# Patient Record
Sex: MALE | URBAN - METROPOLITAN AREA
[De-identification: names, ages, dates, MRNs, and addresses within clinical notes are randomized per-mention and may not be internally consistent; named-entity substitution may affect disease eponyms.]

---

## 2017-01-21 ENCOUNTER — TRANSFERRED RECORDS (OUTPATIENT)
Dept: HEALTH INFORMATION MANAGEMENT | Facility: CLINIC | Age: 72
End: 2017-01-21

## 2017-01-31 ENCOUNTER — HOSPITAL ENCOUNTER (OUTPATIENT)
Dept: CARDIAC REHAB | Facility: CLINIC | Age: 72
End: 2017-01-31
Attending: INTERNAL MEDICINE
Payer: MEDICARE

## 2017-01-31 VITALS — HEIGHT: 69 IN | WEIGHT: 224 LBS | BODY MASS INDEX: 33.18 KG/M2

## 2017-01-31 PROCEDURE — 40000575 ZZH STATISTIC OP CARDIAC VISIT #2: Performed by: OCCUPATIONAL THERAPIST

## 2017-01-31 PROCEDURE — 40000116 ZZH STATISTIC OP CR VISIT: Performed by: OCCUPATIONAL THERAPIST

## 2017-01-31 PROCEDURE — 93797 PHYS/QHP OP CAR RHAB WO ECG: CPT | Mod: 59 | Performed by: OCCUPATIONAL THERAPIST

## 2017-01-31 PROCEDURE — 93798 PHYS/QHP OP CAR RHAB W/ECG: CPT | Performed by: OCCUPATIONAL THERAPIST

## 2017-01-31 ASSESSMENT — 6 MINUTE WALK TEST (6MWT)
GENDER SELECTION: MALE
PREDICTED: 1542.63
FEMALE CALC: 1262.13
MALE CALC: 1533.28
TOTAL DISTANCE WALKED (FT): 1530
GENDER SELECTION: MALE
TOTAL DISTANCE WALKED (FT): 1530

## 2017-01-31 NOTE — PROGRESS NOTES
01/31/17 1500   Session   Session Initial Evaluation and Exercise Prescription   Certified through this date 03/01/17   Cardiac Rehab Assessment   Cardiac Rehab Assessment Patient is a pleasant fellow, who really is not sure he wants to exercise regularly.  He did have an exercise program a few years ago, but just got out of the routine.  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  The patient was assessed to be stable and appropriate to begin exercise.   The patient's functional capacity and exercise prescription were determined by the completion of the 6 minute walk test.  See results below.  The patient was oriented to the program.  Risk factor profile was completed. Goals and objectives were discussed. CV response was WNL. No symptoms, complaints or pain were reported. Good prognosis for reaching above goals. Skilled therapy is necessary in order to monitor CV response to exercise, to provide education on risk factors and behavior change counseling needed to achieve patient's goals.  Plan to progress to 30-40 minutes of exercise prior to discharge from cardiac rehab.  Initial THR of 20-30 beats above RHR; Effort rating of 4-6.  Initiate muscle conditioning as appropriate.  Provide risk factor education and behavior change counseling.        I have established, reviewed and made necessary changes to the individualized treatment plan and exercise prescription for this patient.    Physician Name (printed): ________________________   Date: _______  Time: ______    Physician Signature: ___________________________________________          General Information   Treatment Diagnosis STEMI   Date of Treatment Diagnosis 01/21/17   Secondary Treatment Diagnosis Stent   Significant Past CV History None   Comorbidities None   Other Medical History hx of kidney stones, tubular adenoma of colon, squamous cell CA, Nazia's  "syndrome,   Lead up symptoms left shoulder aching which radiated into bicep and central chest.   Hospital Location [facility]   Hospital Discharge Date 01/25/17   Signs and Symptoms Post Hospital Discharge None   Outpatient Cardiac Rehab Start Date 01/31/17   Primary Physician Dr. Christopher Joshi   Primary Physician Follow Up Scheduled   Cardiologist Dr Castellanos   Cardiologist Follow Up Scheduled   Ejection Fraction 53%   Risk Stratification Low   Summary of Cath Report   Summary of Cath Report Available   Left Main 30   RCA 99 distal   Living and Work Status    Living Arrangements and Social Status apartment   Support System Live with an adult   Return to Employment Yes   Occupation    Preventative Medications   CMS recommended medications Ace inhibitors;Beta Blocker;Antiplatelets;Lipid Lowering;Influenza vaccination;Pneumonia vaccination   Falls Screen   Have you fallen two or more times in the past year? No   Have you fallen and had an injury in the past year? No   Pain   Patient Currently in Pain No   Physical Assessments   Incisions WNL   Edema +1 Trace   Right Lung Sounds not assessed   Left Lung Sounds not assessed   Limitations No limitations   Individualized Treatment Plan   Monitored Sessions Scheduled 36   Monitored Sessions Attended 1   Nutrition Management - Weight Management   Assessment Initial Assessment   Age 71   Weight 101.606 kg (224 lb)   Height 1.74 m (5' 8.5\")   BMI (Calculated) 33.63   Initial Rate Your Plate Score. Dietary tool to assess eating patterns. Scores range from 24 to 72. The higher the score the healthier the eating pattern. 46   Nutrition Management - Lipids   Lipids Labs Not Available   Nutrition Management - Diabetes   Diabetes No   Nutrition Management Summary   Dietary Recommendations Mediterranean;Low Sodium;Low Cholesterol;Low Fat   Stages of Change for Diet Compliance Preparation   Goal #1 Description Patient will increase knowledge of heart healthy diet by either " attending nutrition classes or reading nutrional information pertinent to low sodium and Mediterreanean diet.   Goal #1 Target Date 02/28/17   Goal #2 Description Patient will lose 1/2 to 1 pound per week while in cardiac rehab by reducing fast foods, snacks and salt intake.   Goal #2 Target Date 02/28/17   Psychosocial Management   Psychosocial Assessment Initial   Is there history of clinical depression or increased risk of depression? No previous history   Current Level of Stress per Patient Report Mild   Current Coping Skills (think things through)   Initial Patient Health Questionnaire -9 Score (PHQ-9) for depression. 5-9 Minimal symptoms, 10-14 Minor depression, 15-19 Major depression, moderately severe, > 20 Major depression, severe  0   Initial [facility] Survey score.  Quality of Life:   If total score > 25 review individual areas where patient rated a 4 or 5.  Consider patients current medical condition and what role that plays on the score.   Adjust treatment protocol to improve areas of concern.  Consider the following:  PHQ9 score, DASI, and re-assessment within the next 30 days to assist with developing treatments.  15   Stages of Change Pre-Contemplation   Interventions Planned Patient to verbalize understanding of negative impact of stress to personal health   Other Core Components - Hypertension   History of or Diagnosis of Hypertension No   Currently taking Anti-Hypertensives Beta blocker;Ace Inhibitor   Other Core Components - Tobacco   History of Tobacco Use Yes   Quit Date or Planned Quit Date 01/21/17   Tobacco Use Status Recent (Quit < 6 mo ago)   Tobacco Habit Cigarettes   Tobacco Use per Day (average) 2-3   Stages of Change Preparation   Activity/Exercise History   Activity/Exercise Assessment Initial   Activity/Exercise Status prior to event? Sedentary   Number of Days Currently participating in Moderate Physical Activity? 0   Number of Days Currently performing  Aerobic Exercise  (including rehab)? 0   Number of Minutes per Session Currently of Aerobic Exercise (average)? 0   Current Stage of Change (Physical Activity) Preparation   Current Stage of Change (Aerobic Exercise) Pre-Contemplation   Activity/Exercise Comments Patient is not sure he wants to exercise.  He considers taking 5000 steps per day a lot of exercise.   Exercise Assessment   6 Minute Walk Predicted - Gender Selection Male   6 Minute Walk Predicted (Male) 1533.28   6 Minute Walk Predicted (Female) 1262.13   Initial 6 Minute Walk Distance (Feet) 1530 ft   Resting HR 87 bpm   Exercise HR 95 bpm   Post Exercise HR 82 bpm   Resting /56 mmHg   Exercise /58 mmHg   Post Exercise /60 mmHg   Effort Rating 5   Current MET Level 3.2   MET Level Goal 4-5   ECG Rhythm Normal sinus rhythm   Ectopy PVCs   Current Symptoms Denies symptoms   Exercise Prescription   Mode Treadmill;Nustep   Duration/Time 45-60 min   Frequency 3 daysweek   THR (85% of age predicted max HR) 125.8   OMNI Effort Rating (0-10 Scale) 4-6/10   Progression Continuous bouts;Total exercise time of 30-45 minutes;Progress peak intensity by 1/2 MET per week;Aerobic exercise to OMNI rating of 6 or below and at or below THR   Recommended Home Exercise   Type of Exercise Walking   Frequency (days per week) 2-3   Duration (minutes per session) 15-30 min   Effort Rating Recommended 4-6/10   30 Day Exercise Plan Patient will resume walking at MOA     Current Home Exercise   Type of Exercise None   Learning Assessment   Learner Patient   Primary Language English   Preferred Learning Style Listening;Reading;Demonstration;Pictures/Video   Patient Education   Education recommended Anatomy and Physiology of the Heart;Exercise Principles;Medication Overview;Nutrition;Stress Management

## 2017-01-31 NOTE — PROGRESS NOTES
01/31/17 1600   Session   Session 30 Day Individualized Treatment Plan   Certified through this date 03/04/17   Cardiac Rehab Assessment   Cardiac Rehab Assessment Patient is a pleasant fellow, who really is not sure he wants to exercise regularly.  He did have an exercise program a few years ago, but just got out of the routine.  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  Patient has only had his initial session, but will work on the goals listed below during the next month.   General Information   Treatment Diagnosis STEMI   Date of Treatment Diagnosis 01/21/17   Secondary Treatment Diagnosis Stent   Significant Past CV History None   Comorbidities None   Other Medical History hx of kidney stones, tubular adenoma of colon, squamous cell CA, Nazia's syndrome,   Lead up symptoms left shoulder aching which radiated into bicep and central chest.   Hospital Location [facility]   Hospital Discharge Date 01/25/17   Signs and Symptoms Post Hospital Discharge None   Outpatient Cardiac Rehab Start Date 01/31/17   Primary Physician Dr. Christopher Joshi   Primary Physician Follow Up Scheduled   Cardiologist Dr Castellanos   Cardiologist Follow Up Scheduled   Ejection Fraction 53%   Risk Stratification Low   Summary of Cath Report   Summary of Cath Report Available   Left Main 30   RCA 99 distal   Living and Work Status    Living Arrangements and Social Status apartment   Support System Live with an adult   Return to Employment Yes   Occupation    Preventative Medications   CMS recommended medications Ace inhibitors;Beta Blocker;Antiplatelets;Lipid Lowering;Influenza vaccination;Pneumonia vaccination   Falls Screen   Have you fallen two or more times in the past year? No   Have you fallen and had an injury in the past year? No   Pain   Patient Currently in Pain No   Physical Assessments   Incisions WNL   Edema +1 Trace   Right Lung Sounds not assessed   Left Lung Sounds not assessed   Limitations No  limitations   Individualized Treatment Plan   Monitored Sessions Scheduled 36   Monitored Sessions Attended 1   Nutrition Management - Weight Management   Assessment Initial Assessment   Age 71   Initial Rate Your Plate Score. Dietary tool to assess eating patterns. Scores range from 24 to 72. The higher the score the healthier the eating pattern. 46   Nutrition Management - Lipids   Lipids Labs Not Available   Nutrition Management - Diabetes   Diabetes No   Nutrition Management Summary   Dietary Recommendations Mediterranean;Low Sodium;Low Cholesterol;Low Fat   Stages of Change for Diet Compliance Preparation   Goal #1 Description Patient will increase knowledge of heart healthy diet by either attending nutrition classes or reading nutrional information pertinent to low sodium and Mediterreanean diet.   Goal #1 Target Date 02/28/17   Goal #2 Description Patient will lose 1/2 to 1 pound per week while in cardiac rehab by reducing fast foods, snacks and salt intake.   Goal #2 Target Date 02/28/17   Psychosocial Management   Psychosocial Assessment Initial   Is there history of clinical depression or increased risk of depression? No previous history   Current Level of Stress per Patient Report Mild   Current Coping Skills (think things through)   Initial Patient Health Questionnaire -9 Score (PHQ-9) for depression. 5-9 Minimal symptoms, 10-14 Minor depression, 15-19 Major depression, moderately severe, > 20 Major depression, severe  0   Initial [facility] Survey score.  Quality of Life:   If total score > 25 review individual areas where patient rated a 4 or 5.  Consider patients current medical condition and what role that plays on the score.   Adjust treatment protocol to improve areas of concern.  Consider the following:  PHQ9 score, DASI, and re-assessment within the next 30 days to assist with developing treatments.  15   Stages of Change Pre-Contemplation   Interventions Planned Patient to verbalize  understanding of negative impact of stress to personal health   Other Core Components - Hypertension   History of or Diagnosis of Hypertension No   Currently taking Anti-Hypertensives Beta blocker;Ace Inhibitor   Other Core Components - Tobacco   History of Tobacco Use Yes   Quit Date or Planned Quit Date 01/21/17   Tobacco Use Status Recent (Quit < 6 mo ago)   Tobacco Habit Cigarettes   Tobacco Use per Day (average) 2-3   Stages of Change Preparation   Activity/Exercise History   Activity/Exercise Assessment Initial   Activity/Exercise Status prior to event? Sedentary   Number of Days Currently participating in Moderate Physical Activity? 0   Number of Days Currently performing  Aerobic Exercise (including rehab)? 0   Number of Minutes per Session Currently of Aerobic Exercise (average)? 0   Current Stage of Change (Physical Activity) Preparation   Current Stage of Change (Aerobic Exercise) Pre-Contemplation   Activity/Exercise Comments Patient is not sure he wants to exercise.  He considers taking 5000 steps per day a lot of exercise.   Exercise Assessment   6 Minute Walk Predicted - Gender Selection Male   Initial 6 Minute Walk Distance (Feet) 1530 ft   Resting HR 87 bpm   Exercise HR 95 bpm   Post Exercise HR 82 bpm   Resting /56 mmHg   Exercise /58 mmHg   Post Exercise /60 mmHg   Effort Rating 5   Current MET Level 3.2   MET Level Goal 4-5   ECG Rhythm Normal sinus rhythm   Ectopy PVCs   Current Symptoms Denies symptoms   Exercise Prescription   Mode Treadmill;Nustep   Duration/Time 45-60 min   Frequency 3 daysweek   THR (85% of age predicted max HR) 125.8   OMNI Effort Rating (0-10 Scale) 4-6/10   Progression Continuous bouts;Total exercise time of 30-45 minutes;Progress peak intensity by 1/2 MET per week;Aerobic exercise to OMNI rating of 6 or below and at or below THR   Recommended Home Exercise   Type of Exercise Walking   Frequency (days per week) 2-3   Duration (minutes per session)  15-30 min   Effort Rating Recommended 4-6/10   30 Day Exercise Plan Patient will resume walking at MOA     Current Home Exercise   Type of Exercise None   Learning Assessment   Learner Patient   Primary Language English   Preferred Learning Style Listening;Reading;Demonstration;Pictures/Video   Patient Education   Education recommended Anatomy and Physiology of the Heart;Exercise Principles;Medication Overview;Nutrition;Stress Management   Physician cosignature/electronic signature indicates approval of this ITP document. I have established, reviewed and made necessary changes to the individualized treatment plan and exercise prescription for this patient.

## 2017-03-03 NOTE — PROGRESS NOTES
OP Cardiac Rehab Discharge Summary    Reason for therapy discharge:    Patient/family request discontinuation of services.    Progress towards therapy goal(s). See goals on Care Plan in Epic electronic health record for goal details.  Goals not met.  Barriers to achieving goals:   Pt never returned to rehab after eval..    Therapy recommendation(s):    Continue home exercise program.

## 2025-07-26 ENCOUNTER — APPOINTMENT (OUTPATIENT)
Dept: GENERAL RADIOLOGY | Facility: CLINIC | Age: 80
DRG: 323 | End: 2025-07-26
Attending: EMERGENCY MEDICINE
Payer: COMMERCIAL

## 2025-07-26 ENCOUNTER — HOSPITAL ENCOUNTER (INPATIENT)
Facility: CLINIC | Age: 80
End: 2025-07-26
Attending: EMERGENCY MEDICINE | Admitting: INTERNAL MEDICINE
Payer: COMMERCIAL

## 2025-07-26 DIAGNOSIS — I21.3 ST ELEVATION MI (STEMI) (H): ICD-10-CM

## 2025-07-26 DIAGNOSIS — K25.4 GASTROINTESTINAL HEMORRHAGE ASSOCIATED WITH GASTRIC ULCER: ICD-10-CM

## 2025-07-26 DIAGNOSIS — I21.3 ST ELEVATION MYOCARDIAL INFARCTION (STEMI), UNSPECIFIED ARTERY (H): Primary | ICD-10-CM

## 2025-07-26 DIAGNOSIS — I21.11 ST ELEVATION MYOCARDIAL INFARCTION INVOLVING RIGHT CORONARY ARTERY (H): ICD-10-CM

## 2025-07-26 PROBLEM — Z98.61 PERCUTANEOUS TRANSLUMINAL CORONARY ANGIOPLASTY STATUS: Status: ACTIVE | Noted: 2025-07-26

## 2025-07-26 LAB
ACT BLD: 219 SECONDS (ref 74–150)
ACT BLD: 219 SECONDS (ref 74–150)
ACT BLD: 227 SECONDS (ref 74–150)
ACT BLD: 235 SECONDS (ref 74–150)
ACT BLD: 239 SECONDS (ref 74–150)
ACT BLD: 267 SECONDS (ref 74–150)
ACT BLD: 284 SECONDS (ref 74–150)
ANION GAP SERPL CALCULATED.3IONS-SCNC: 16 MMOL/L (ref 7–15)
APTT PPP: 30 SECONDS (ref 22–38)
ATRIAL RATE - MUSE: 103 BPM
ATRIAL RATE - MUSE: 113 BPM
BUN SERPL-MCNC: 26.4 MG/DL (ref 8–23)
CALCIUM SERPL-MCNC: 9.9 MG/DL (ref 8.8–10.4)
CHLORIDE SERPL-SCNC: 99 MMOL/L (ref 98–107)
CREAT SERPL-MCNC: 0.77 MG/DL (ref 0.67–1.17)
DIASTOLIC BLOOD PRESSURE - MUSE: NORMAL MMHG
DIASTOLIC BLOOD PRESSURE - MUSE: NORMAL MMHG
EGFRCR SERPLBLD CKD-EPI 2021: 90 ML/MIN/1.73M2
ERYTHROCYTE [DISTWIDTH] IN BLOOD BY AUTOMATED COUNT: 15.5 % (ref 10–15)
GLUCOSE BLDC GLUCOMTR-MCNC: 145 MG/DL (ref 70–99)
GLUCOSE SERPL-MCNC: 168 MG/DL (ref 70–99)
HCO3 SERPL-SCNC: 21 MMOL/L (ref 22–29)
HCT VFR BLD AUTO: 34.5 % (ref 40–53)
HGB BLD-MCNC: 11.1 G/DL (ref 13.3–17.7)
HOLD SPECIMEN: NORMAL
HOLD SPECIMEN: NORMAL
INR PPP: 1.21 (ref 0.85–1.15)
INTERPRETATION ECG - MUSE: NORMAL
INTERPRETATION ECG - MUSE: NORMAL
MCH RBC QN AUTO: 27.3 PG (ref 26.5–33)
MCHC RBC AUTO-ENTMCNC: 32.2 G/DL (ref 31.5–36.5)
MCV RBC AUTO: 85 FL (ref 78–100)
P AXIS - MUSE: 13 DEGREES
P AXIS - MUSE: 51 DEGREES
PLATELET # BLD AUTO: 312 10E3/UL (ref 150–450)
POTASSIUM SERPL-SCNC: 4.5 MMOL/L (ref 3.4–5.3)
PR INTERVAL - MUSE: 200 MS
PR INTERVAL - MUSE: 202 MS
PROTHROMBIN TIME: 15 SECONDS (ref 11.8–14.8)
QRS DURATION - MUSE: 110 MS
QRS DURATION - MUSE: 80 MS
QT - MUSE: 324 MS
QT - MUSE: 370 MS
QTC - MUSE: 444 MS
QTC - MUSE: 489 MS
R AXIS - MUSE: -7 DEGREES
R AXIS - MUSE: 9 DEGREES
RBC # BLD AUTO: 4.07 10E6/UL (ref 4.4–5.9)
SODIUM SERPL-SCNC: 136 MMOL/L (ref 135–145)
SYSTOLIC BLOOD PRESSURE - MUSE: NORMAL MMHG
SYSTOLIC BLOOD PRESSURE - MUSE: NORMAL MMHG
T AXIS - MUSE: 132 DEGREES
T AXIS - MUSE: 138 DEGREES
TROPONIN T SERPL HS-MCNC: 151 NG/L
TROPONIN T SERPL HS-MCNC: 22 NG/L
TSH SERPL DL<=0.005 MIU/L-ACNC: 0.77 UIU/ML (ref 0.3–4.2)
VENTRICULAR RATE- MUSE: 105 BPM
VENTRICULAR RATE- MUSE: 113 BPM
WBC # BLD AUTO: 9.8 10E3/UL (ref 4–11)

## 2025-07-26 PROCEDURE — C1887 CATHETER, GUIDING: HCPCS | Performed by: INTERNAL MEDICINE

## 2025-07-26 PROCEDURE — 210N000002 HC R&B HEART CARE

## 2025-07-26 PROCEDURE — 250N000011 HC RX IP 250 OP 636: Performed by: EMERGENCY MEDICINE

## 2025-07-26 PROCEDURE — 02F03ZZ FRAGMENTATION IN CORONARY ARTERY, ONE ARTERY, PERCUTANEOUS APPROACH: ICD-10-PCS | Performed by: INTERNAL MEDICINE

## 2025-07-26 PROCEDURE — 80048 BASIC METABOLIC PNL TOTAL CA: CPT | Performed by: EMERGENCY MEDICINE

## 2025-07-26 PROCEDURE — 250N000009 HC RX 250: Performed by: INTERNAL MEDICINE

## 2025-07-26 PROCEDURE — 85730 THROMBOPLASTIN TIME PARTIAL: CPT | Performed by: EMERGENCY MEDICINE

## 2025-07-26 PROCEDURE — 85347 COAGULATION TIME ACTIVATED: CPT

## 2025-07-26 PROCEDURE — 250N000013 HC RX MED GY IP 250 OP 250 PS 637: Performed by: EMERGENCY MEDICINE

## 2025-07-26 PROCEDURE — 84484 ASSAY OF TROPONIN QUANT: CPT | Performed by: EMERGENCY MEDICINE

## 2025-07-26 PROCEDURE — C9606 PERC D-E COR REVASC W AMI S: HCPCS | Performed by: INTERNAL MEDICINE

## 2025-07-26 PROCEDURE — 99153 MOD SED SAME PHYS/QHP EA: CPT | Performed by: INTERNAL MEDICINE

## 2025-07-26 PROCEDURE — 93005 ELECTROCARDIOGRAM TRACING: CPT

## 2025-07-26 PROCEDURE — 99223 1ST HOSP IP/OBS HIGH 75: CPT | Mod: 25 | Performed by: INTERNAL MEDICINE

## 2025-07-26 PROCEDURE — 99291 CRITICAL CARE FIRST HOUR: CPT | Mod: 25 | Performed by: EMERGENCY MEDICINE

## 2025-07-26 PROCEDURE — 93454 CORONARY ARTERY ANGIO S&I: CPT | Performed by: INTERNAL MEDICINE

## 2025-07-26 PROCEDURE — 250N000011 HC RX IP 250 OP 636: Performed by: INTERNAL MEDICINE

## 2025-07-26 PROCEDURE — C1894 INTRO/SHEATH, NON-LASER: HCPCS | Performed by: INTERNAL MEDICINE

## 2025-07-26 PROCEDURE — 99222 1ST HOSP IP/OBS MODERATE 55: CPT | Performed by: INTERNAL MEDICINE

## 2025-07-26 PROCEDURE — 85610 PROTHROMBIN TIME: CPT | Performed by: EMERGENCY MEDICINE

## 2025-07-26 PROCEDURE — B2111ZZ FLUOROSCOPY OF MULTIPLE CORONARY ARTERIES USING LOW OSMOLAR CONTRAST: ICD-10-PCS | Performed by: INTERNAL MEDICINE

## 2025-07-26 PROCEDURE — 4A023N7 MEASUREMENT OF CARDIAC SAMPLING AND PRESSURE, LEFT HEART, PERCUTANEOUS APPROACH: ICD-10-PCS | Performed by: INTERNAL MEDICINE

## 2025-07-26 PROCEDURE — 93005 ELECTROCARDIOGRAM TRACING: CPT | Mod: 76

## 2025-07-26 PROCEDURE — 80061 LIPID PANEL: CPT | Performed by: INTERNAL MEDICINE

## 2025-07-26 PROCEDURE — 027035Z DILATION OF CORONARY ARTERY, ONE ARTERY WITH TWO DRUG-ELUTING INTRALUMINAL DEVICES, PERCUTANEOUS APPROACH: ICD-10-PCS | Performed by: INTERNAL MEDICINE

## 2025-07-26 PROCEDURE — 84484 ASSAY OF TROPONIN QUANT: CPT | Performed by: INTERNAL MEDICINE

## 2025-07-26 PROCEDURE — 36415 COLL VENOUS BLD VENIPUNCTURE: CPT | Performed by: INTERNAL MEDICINE

## 2025-07-26 PROCEDURE — 96374 THER/PROPH/DIAG INJ IV PUSH: CPT

## 2025-07-26 PROCEDURE — C1769 GUIDE WIRE: HCPCS | Performed by: INTERNAL MEDICINE

## 2025-07-26 PROCEDURE — 36415 COLL VENOUS BLD VENIPUNCTURE: CPT | Performed by: EMERGENCY MEDICINE

## 2025-07-26 PROCEDURE — 84443 ASSAY THYROID STIM HORMONE: CPT | Performed by: INTERNAL MEDICINE

## 2025-07-26 PROCEDURE — 99152 MOD SED SAME PHYS/QHP 5/>YRS: CPT | Performed by: INTERNAL MEDICINE

## 2025-07-26 PROCEDURE — 258N000003 HC RX IP 258 OP 636: Performed by: INTERNAL MEDICINE

## 2025-07-26 PROCEDURE — 85027 COMPLETE CBC AUTOMATED: CPT | Performed by: EMERGENCY MEDICINE

## 2025-07-26 PROCEDURE — C1761 HC OR CATH, TRANS INTRA LITHO/CORONARY: HCPCS | Performed by: INTERNAL MEDICINE

## 2025-07-26 PROCEDURE — C1874 STENT, COATED/COV W/DEL SYS: HCPCS | Performed by: INTERNAL MEDICINE

## 2025-07-26 PROCEDURE — 82374 ASSAY BLOOD CARBON DIOXIDE: CPT | Performed by: EMERGENCY MEDICINE

## 2025-07-26 PROCEDURE — 272N000001 HC OR GENERAL SUPPLY STERILE: Performed by: INTERNAL MEDICINE

## 2025-07-26 PROCEDURE — 71045 X-RAY EXAM CHEST 1 VIEW: CPT

## 2025-07-26 PROCEDURE — C1725 CATH, TRANSLUMIN NON-LASER: HCPCS | Performed by: INTERNAL MEDICINE

## 2025-07-26 PROCEDURE — 92972 PERQ TRLUML CORONRY LITHOTRP: CPT | Performed by: INTERNAL MEDICINE

## 2025-07-26 DEVICE — STENT COR ONYX FRONTIER 12X3MM ONYXNG30012UX: Type: IMPLANTABLE DEVICE | Status: FUNCTIONAL

## 2025-07-26 DEVICE — STENT COR ONYX FRONTIER 38X3MM ONYXNG30038UX: Type: IMPLANTABLE DEVICE | Status: FUNCTIONAL

## 2025-07-26 RX ORDER — SODIUM CHLORIDE 9 MG/ML
INJECTION, SOLUTION INTRAVENOUS CONTINUOUS
Status: DISCONTINUED | OUTPATIENT
Start: 2025-07-26 | End: 2025-07-27

## 2025-07-26 RX ORDER — VERAPAMIL HYDROCHLORIDE 2.5 MG/ML
INJECTION INTRAVENOUS
Status: DISCONTINUED | OUTPATIENT
Start: 2025-07-26 | End: 2025-07-26 | Stop reason: HOSPADM

## 2025-07-26 RX ORDER — PROCHLORPERAZINE MALEATE 5 MG/1
5 TABLET ORAL EVERY 6 HOURS PRN
Status: ACTIVE | OUTPATIENT
Start: 2025-07-26

## 2025-07-26 RX ORDER — ACETAMINOPHEN 325 MG/1
650 TABLET ORAL EVERY 4 HOURS PRN
Status: DISCONTINUED | OUTPATIENT
Start: 2025-07-26 | End: 2025-07-27

## 2025-07-26 RX ORDER — ONDANSETRON 8 MG/1
8 TABLET, FILM COATED ORAL EVERY 8 HOURS PRN
Status: DISCONTINUED | OUTPATIENT
Start: 2025-07-26 | End: 2025-07-26

## 2025-07-26 RX ORDER — HEPARIN SODIUM 1000 [USP'U]/ML
INJECTION, SOLUTION INTRAVENOUS; SUBCUTANEOUS
Status: DISCONTINUED | OUTPATIENT
Start: 2025-07-26 | End: 2025-07-26 | Stop reason: HOSPADM

## 2025-07-26 RX ORDER — ASPIRIN 81 MG/1
81 TABLET, CHEWABLE ORAL ONCE
Status: DISCONTINUED | OUTPATIENT
Start: 2025-07-26 | End: 2025-07-26

## 2025-07-26 RX ORDER — ONDANSETRON 4 MG/1
4 TABLET, ORALLY DISINTEGRATING ORAL EVERY 6 HOURS PRN
Status: ACTIVE | OUTPATIENT
Start: 2025-07-26

## 2025-07-26 RX ORDER — TICAGRELOR 90 MG/1
180 TABLET, FILM COATED ORAL ONCE
Status: COMPLETED | OUTPATIENT
Start: 2025-07-26 | End: 2025-07-26

## 2025-07-26 RX ORDER — OXYCODONE HYDROCHLORIDE 5 MG/1
10 TABLET ORAL EVERY 4 HOURS PRN
Status: DISCONTINUED | OUTPATIENT
Start: 2025-07-26 | End: 2025-07-26

## 2025-07-26 RX ORDER — FENTANYL CITRATE 50 UG/ML
25 INJECTION, SOLUTION INTRAMUSCULAR; INTRAVENOUS
Status: DISCONTINUED | OUTPATIENT
Start: 2025-07-26 | End: 2025-07-26

## 2025-07-26 RX ORDER — ACETAMINOPHEN 325 MG/1
325-650 TABLET ORAL EVERY 6 HOURS PRN
Status: DISCONTINUED | OUTPATIENT
Start: 2025-07-26 | End: 2025-07-26

## 2025-07-26 RX ORDER — ONDANSETRON 2 MG/ML
4 INJECTION INTRAMUSCULAR; INTRAVENOUS EVERY 6 HOURS PRN
Status: DISPENSED | OUTPATIENT
Start: 2025-07-26

## 2025-07-26 RX ORDER — ATROPINE SULFATE 0.1 MG/ML
0.5 INJECTION INTRAVENOUS
Status: DISCONTINUED | OUTPATIENT
Start: 2025-07-26 | End: 2025-07-27

## 2025-07-26 RX ORDER — PROCHLORPERAZINE MALEATE 5 MG/1
5 TABLET ORAL EVERY 6 HOURS PRN
Status: ON HOLD | COMMUNITY

## 2025-07-26 RX ORDER — TICAGRELOR 90 MG/1
90 TABLET, FILM COATED ORAL 2 TIMES DAILY
Status: DISCONTINUED | OUTPATIENT
Start: 2025-07-26 | End: 2025-07-26

## 2025-07-26 RX ORDER — ACETAMINOPHEN 650 MG/1
650 SUPPOSITORY RECTAL EVERY 4 HOURS PRN
Status: DISCONTINUED | OUTPATIENT
Start: 2025-07-26 | End: 2025-07-27

## 2025-07-26 RX ORDER — NITROGLYCERIN 5 MG/ML
VIAL (ML) INTRAVENOUS
Status: DISCONTINUED | OUTPATIENT
Start: 2025-07-26 | End: 2025-07-26 | Stop reason: HOSPADM

## 2025-07-26 RX ORDER — ASPIRIN 81 MG/1
81 TABLET ORAL DAILY
Status: ON HOLD | COMMUNITY
End: 2025-07-26

## 2025-07-26 RX ORDER — TICAGRELOR 90 MG/1
90 TABLET, FILM COATED ORAL EVERY 12 HOURS
Status: DISCONTINUED | OUTPATIENT
Start: 2025-07-27 | End: 2025-07-28

## 2025-07-26 RX ORDER — NITROGLYCERIN 0.4 MG/1
0.4 TABLET SUBLINGUAL EVERY 5 MIN PRN
Status: DISCONTINUED | OUTPATIENT
Start: 2025-07-26 | End: 2025-07-26

## 2025-07-26 RX ORDER — FLUMAZENIL 0.1 MG/ML
0.2 INJECTION, SOLUTION INTRAVENOUS
Status: DISCONTINUED | OUTPATIENT
Start: 2025-07-26 | End: 2025-07-28

## 2025-07-26 RX ORDER — MAGNESIUM HYDROXIDE/ALUMINUM HYDROXICE/SIMETHICONE 120; 1200; 1200 MG/30ML; MG/30ML; MG/30ML
30 SUSPENSION ORAL EVERY 4 HOURS PRN
Status: ACTIVE | OUTPATIENT
Start: 2025-07-26

## 2025-07-26 RX ORDER — NALOXONE HYDROCHLORIDE 0.4 MG/ML
0.2 INJECTION, SOLUTION INTRAMUSCULAR; INTRAVENOUS; SUBCUTANEOUS
Status: ACTIVE | OUTPATIENT
Start: 2025-07-26

## 2025-07-26 RX ORDER — IOPAMIDOL 755 MG/ML
INJECTION, SOLUTION INTRAVASCULAR
Status: DISCONTINUED | OUTPATIENT
Start: 2025-07-26 | End: 2025-07-26 | Stop reason: HOSPADM

## 2025-07-26 RX ORDER — DEXAMETHASONE 4 MG/1
8 TABLET ORAL DAILY
Status: ON HOLD | COMMUNITY

## 2025-07-26 RX ORDER — LIDOCAINE AND PRILOCAINE 25; 25 MG/G; MG/G
CREAM TOPICAL DAILY PRN
Status: ON HOLD | COMMUNITY

## 2025-07-26 RX ORDER — ACETAMINOPHEN 325 MG/1
325-650 TABLET ORAL EVERY 6 HOURS PRN
Status: ON HOLD | COMMUNITY

## 2025-07-26 RX ORDER — HYDROMORPHONE HCL IN WATER/PF 6 MG/30 ML
0.2 PATIENT CONTROLLED ANALGESIA SYRINGE INTRAVENOUS
Refills: 0 | Status: DISCONTINUED | OUTPATIENT
Start: 2025-07-26 | End: 2025-07-26

## 2025-07-26 RX ORDER — LIDOCAINE 40 MG/G
CREAM TOPICAL
Status: DISCONTINUED | OUTPATIENT
Start: 2025-07-26 | End: 2025-07-30

## 2025-07-26 RX ORDER — ACETAMINOPHEN 325 MG/1
650 TABLET ORAL EVERY 4 HOURS PRN
Status: DISCONTINUED | OUTPATIENT
Start: 2025-07-26 | End: 2025-07-26

## 2025-07-26 RX ORDER — OXYCODONE HYDROCHLORIDE 5 MG/1
5 TABLET ORAL EVERY 4 HOURS PRN
Status: DISCONTINUED | OUTPATIENT
Start: 2025-07-26 | End: 2025-07-26

## 2025-07-26 RX ORDER — ONDANSETRON 8 MG/1
8 TABLET, FILM COATED ORAL EVERY 8 HOURS PRN
Status: ON HOLD | COMMUNITY

## 2025-07-26 RX ORDER — HYDRALAZINE HYDROCHLORIDE 20 MG/ML
10 INJECTION INTRAMUSCULAR; INTRAVENOUS EVERY 4 HOURS PRN
Status: ACTIVE | OUTPATIENT
Start: 2025-07-26

## 2025-07-26 RX ORDER — NALOXONE HYDROCHLORIDE 0.4 MG/ML
0.4 INJECTION, SOLUTION INTRAMUSCULAR; INTRAVENOUS; SUBCUTANEOUS
Status: ACTIVE | OUTPATIENT
Start: 2025-07-26

## 2025-07-26 RX ORDER — NITROGLYCERIN 0.4 MG/1
0.4 TABLET SUBLINGUAL EVERY 5 MIN PRN
Status: ACTIVE | OUTPATIENT
Start: 2025-07-26

## 2025-07-26 RX ORDER — METOPROLOL TARTRATE 1 MG/ML
5 INJECTION, SOLUTION INTRAVENOUS
Status: DISCONTINUED | OUTPATIENT
Start: 2025-07-26 | End: 2025-07-31

## 2025-07-26 RX ADMIN — HEPARIN SODIUM 3000 UNITS: 1000 INJECTION, SOLUTION INTRAVENOUS; SUBCUTANEOUS at 18:30

## 2025-07-26 RX ADMIN — TICAGRELOR 180 MG: 90 TABLET, FILM COATED ORAL at 18:30

## 2025-07-26 RX ADMIN — SODIUM CHLORIDE: 0.9 INJECTION, SOLUTION INTRAVENOUS at 21:21

## 2025-07-26 ASSESSMENT — ACTIVITIES OF DAILY LIVING (ADL)
ADLS_ACUITY_SCORE: 41

## 2025-07-26 NOTE — Clinical Note
The first balloon was inserted into the right coronary artery.Max pressure = 9 izaiah. Total duration = 18 seconds.     Max pressure = 12 izaiah. Total duration = 20 seconds.    Balloon reinflated a second time: Max pressure = 12 izaiah. Total duration = 20 seconds.

## 2025-07-26 NOTE — Clinical Note
PT DAILY TREATMENT NOTE - [facility] 2-15    Patient Name: Radhika Fofana  Date:3/29/2021  : 1959  [x]  Patient  Verified  Payor: [employer] / Plan: [facility] [employer] [address] / Product Type: Commerical /    In time:  845  Out time: 930   Total Treatment Time (min):45      Total Timed Codes (min):  45  1:1 Treatment Time [facility] only):45      Visit #: 8    Treatment Area: Pain in both knees [M25.561, M25.562]    SUBJECTIVE  Pain Level (0-10 scale), subjective functional status/changes: Pt reports 0/10 knee pain. She hasn't made an appointment with Dr. Mary Carmen Marshall yet. Any medication changes, allergies to medications, adverse drug reactions, diagnosis change, or new procedure performed?: [x] No    [] Yes (see summary sheet for update) SEE ABOVE. OBJECTIVE     -    10 min Modality:      [x]  Ice     []  Heat       Position/location:   Right knee x 10 ' end of session. Rationale: decrease pain to improve the patients ability to do functional activities   [x] Skin assessment post-treatment:  [x]intact []redness- no adverse reaction    []redness  adverse reaction:      40 min Therapeutic Exercise:  [x] See flow sheet :    Rationale: increase strength, improve coordination and increase proprioception to improve the patients ability to negotiate stairs, don/doff shoes/socks, transfer sit to stand    10 min Manual Therapy:  PF mobs all ways grade II and III. Hamstring stretch manually. Rationale: decrease pain, increase tissue extensibility and decrease trigger points  to improve the patients ability to see above. With   [] TE   [] TA   [] neuro   [] other: Patient Education: [x] Review HEP    [] Progressed/Changed HEP based on:   [] positioning   [] body mechanics   [] transfers   [] heat/ice application    [] other:        Pain Level (0-10 scale) post treatment: 0    ASSESSMENT/Changes in Function:   Pt did well in session today but cont.  To report pain with Stent inserted over the wire.  work duties and heavy cleaning at home. Patient will continue to benefit from skilled PT services to modify and progress therapeutic interventions, address functional mobility deficits, address ROM deficits, address strength deficits, analyze and address soft tissue restrictions, analyze and modify body mechanics/ergonomics, assess and modify postural abnormalities and instruct in home and community integration to attain remaining goals. Progress towards goals / Updated goals:  Goals were not re-assessed today.      PLAN      [x]  Continue plan of care    []  Other:_      Tika Santos, PT DPT, OCS 3/29/2021  4:32 AM       PT License #[id number]

## 2025-07-26 NOTE — Clinical Note
The first balloon was inserted into the right coronary artery.Max pressure = 12 izaiah. Total duration = 18 seconds.

## 2025-07-26 NOTE — Clinical Note
The first balloon was inserted into the right coronary artery.Max pressure = 6 izaiah. Total duration = 19 seconds.     Max pressure = 6 izaiah. Total duration = 14 seconds.    Balloon reinflated a second time: Max pressure = 6 izaiah. Total duration = 14 seconds.

## 2025-07-26 NOTE — Clinical Note
The first balloon was inserted into the right coronary artery.Max pressure = 12 izaiah. Total duration = 32 seconds.     Max pressure = 12 izaiah. Total duration = 19 seconds.    Balloon reinflated a second time: Max pressure = 12 izaiah. Total duration = 19 seconds.  Balloon reinflated a third time: Max pressure = 12 iaziah. Total duration = 12 seconds.  Balloon reinflated a fourth time: Max pressure = 12 izaiah. Total duration = 11 seconds.  Balloon  reinflated a fourth time: Max pressure = 12 izaiah. Total duration = 18 seconds.

## 2025-07-26 NOTE — Clinical Note
The first balloon was inserted into the right coronary artery.Max pressure = 12 izaiah. Total duration = 10 seconds.

## 2025-07-26 NOTE — Clinical Note
The first balloon was inserted into the right coronary artery.Max pressure = 14 izaiah. Total duration = 15 seconds.     Max pressure = 16 izaiah. Total duration = 15 seconds.    Balloon reinflated a second time: Max pressure = 16 izaiah. Total duration = 15 seconds.  Balloon reinflated a third time: Max pressure = 16 izaiah. Total duration = 17 seconds.  Balloon reinflated a fourth time: Max pressure = 12 izaiah. Total duration = 9 seconds.

## 2025-07-26 NOTE — Clinical Note
The first balloon was inserted into the right coronary artery.Max pressure = 8 izaiah. Total duration = 14 seconds.     Max pressure = 12 izaiah. Total duration = 8 seconds.    Balloon reinflated a second time: Max pressure = 12 izaiah. Total duration = 8 seconds. 8Max pressure = 12 izaiah. Total duration = 8 seconds.  Balloon reinflated a fourth time: Max pressure = 12 izaiah. Total duration = 10 seconds.

## 2025-07-26 NOTE — Clinical Note
The first balloon was inserted into the right coronary artery.Max pressure = 12 izaiah. Total duration = 7 seconds.

## 2025-07-26 NOTE — Clinical Note
The first balloon was inserted into the right coronary artery.Max pressure = 14 izaiah. Total duration = 13 seconds.     Max pressure = 14 izaiah. Total duration = 10 seconds.    Balloon reinflated a second time: Max pressure = 14 izaiah. Total duration = 10 seconds.

## 2025-07-26 NOTE — Clinical Note
The first balloon was inserted into the right coronary artery.Max pressure = 12 izaiah. Total duration = 18 seconds.     Max pressure = 12 izaiah. Total duration = 9 seconds.    Balloon reinflated a second time: Max pressure = 12 izaiah. Total duration = 9 seconds.

## 2025-07-26 NOTE — ED PROVIDER NOTES
Emergency Department Note      History of Present Illness     Chief Complaint   Chest Pain      HPI   Aydin Blanc is a 80 year old male with a history of MI, CAD s/p stent placement, DVT, PE anticoagulated with Eliquis, NSVT, and esophageal adenocarcinoma who presents to the ED via EMS from his independent living facility. The patient states he started to experience chest pressure while watching TV shortly after straining for a bowel movement just prior to arrival. He is unsure if his pain was worse with exertion. He called EMS at that point. They noted a blood pressure of 147/90 and NSETMI on EKG on arrival about. They administered nitroglycerin about 30-45 minutes after symptom onset with complete relief of his chest pain. Reports a blood glucose of 176. Patient notes a prior MI with stent placement and current known DVT/PE to bilateral upper extremities, liver, and lungs. He is currently on Eliquis for these conditions. Taking as directed, no missed doses, last taken at 0900 today. Reports an anaphylactic type response to aspirin with throat closing but he is able to tolerate 81 mg if needed, though it does take some time to build up tolerance. States he has a history of esophageal cancer and had a routine follow-up appointment today for this. Denies shortness of breath, nausea, vomiting, diarrhea, abdominal pain, lightheadedness, or dizziness.    Independent Historian   EMS as detailed above.    Review of External Notes   I reviewed the Oncology office visit note from earlier today.     Past Medical History     Medical History and Problem List   DVT  Esophageal adenocarcinoma  BPH with urinary obstruction  CAD  Nazia's syndrome  NSVT  BCC  SCC  MI    Medications   Eliquis  Lipitor  Losartan  Toprol  Ondansetron  Oxycodone  Compazine  Mylicon     Surgical History   ENT surgery     Physical Exam     Patient Vitals for the past 24 hrs:   BP Temp Temp src Pulse Resp SpO2 Weight   07/26/25 2145 126/75  -- -- 56 -- 96 % --   07/26/25 2130 130/75 -- -- 60 -- 96 % --   07/26/25 2108 120/73 97.5  F (36.4  C) Oral 60 16 94 % --   07/26/25 2026 -- -- -- -- 15 -- --   07/26/25 2018 -- -- -- -- 16 -- --   07/26/25 2008 -- -- -- -- 16 -- --   07/26/25 1957 -- -- -- -- 18 -- --   07/26/25 1950 -- -- -- -- 16 -- --   07/26/25 1940 -- -- -- -- 15 -- --   07/26/25 1928 -- -- -- -- 17 -- --   07/26/25 1918 -- -- -- -- 15 -- --   07/26/25 1906 -- -- -- -- 16 -- --   07/26/25 1856 -- -- -- -- 16 -- --   07/26/25 1845 -- -- -- -- 15 -- --   07/26/25 1830 129/78 -- -- 73 12 99 % --   07/26/25 1815 (!) 141/108 -- -- 84 21 97 % --   07/26/25 1813 -- -- -- 82 19 96 % --   07/26/25 1812 -- -- -- -- -- -- 89.8 kg (198 lb)   07/26/25 1810 -- -- -- 87 13 99 % --   07/26/25 1805 -- -- -- 92 12 -- --   07/26/25 1800 132/84 -- -- 102 18 95 % --   07/26/25 1757 (!) 110/92 98.9  F (37.2  C) Temporal 107 16 (!) 85 % --   07/26/25 1755 (!) 110/92 -- -- 105 24 95 % --     Physical Exam  Constitutional:       Appearance: Normal appearance.   HENT:      Head: Normocephalic and atraumatic.   Eyes:      Extraocular Movements: Extraocular movements intact.      Conjunctiva/sclera: Conjunctivae normal.   Cardiovascular:      Rate and Rhythm: Normal rate and regular rhythm.      Sounds: No murmurs on auscultation.  Pulmonary:      Effort: Pulmonary effort is normal. No respiratory distress.      Breath sounds: Clear to auscultation bilaterally.  No wheezing.  No crackles.  No stridor.  Abdominal:      General: Abdomen is flat. There is no distension.      Palpations: Abdomen is soft.      Tenderness: There is no abdominal tenderness.   Musculoskeletal:      Cervical back: Normal range of motion. No rigidity.       Right lower leg: No edema.      Left lower leg: No edema.   Skin:     General: Skin is warm and dry.   Neurological:      General: No focal deficit present.      Mental Status: Alert and oriented to person, place, and time.   Psychiatric:          Mood and Affect: Mood normal.         Behavior: Behavior normal.    Diagnostics     Lab Results   Labs Ordered and Resulted from Time of ED Arrival to Time of ED Departure   CBC WITH PLATELETS (LIMITED OCCURRENCES) - Abnormal       Result Value    WBC Count 9.8      RBC Count 4.07 (*)     Hemoglobin 11.1 (*)     Hematocrit 34.5 (*)     MCV 85      MCH 27.3      MCHC 32.2      RDW 15.5 (*)     Platelet Count 312         Imaging   Cardiac Catheterization   Final Result      XR Chest Port 1 View   Final Result   IMPRESSION: No evidence for CHF or pneumonia. Normal heart size. No pleural effusion or pneumothorax. Stable right IJ Port-A-Cath. Stable stent in the distal esophagus.      Echocardiogram Complete    (Results Pending)       EKG   ECG results from 07/26/25   EKG 12 lead     Value    Systolic Blood Pressure     Diastolic Blood Pressure     Ventricular Rate 113    Atrial Rate 113    ME Interval 200    QRS Duration 80        QTc 444    P Axis 51    R AXIS 9    T Axis 138    Interpretation ECG      ** Critical Test Result: STEMI  Sinus tachycardia with occasional Premature ventricular complexes  Inferior infarct , possibly acute  Poor R-wave progression ; consider anterior infarct, lead placement, or normal variant  ** ** ACUTE MI / STEMI ** **  Consider right ventricular involvement in acute inferior infarct  Abnormal ECG  No previous ECGs available     EKG 12 lead     Value    Systolic Blood Pressure     Diastolic Blood Pressure     Ventricular Rate 105    Atrial Rate 103    ME Interval 202    QRS Duration 110        QTc 489    P Axis 13    R AXIS -7    T Axis 132    Interpretation ECG      ** Critical Test Result: STEMI  Undetermined rhythm  Inferior infarct Cannot rule out Anterior infarct T wave abnormality, consider lateral ischemia  QTcB >= 480 msec  ** ** ACUTE MI / STEMI ** **  Consider right ventricular involvement in acute inferior infarct  Abnormal ECG  When compared with ECG of  26-Jul-2025 17:58, (unconfirmed)  Significant changes have occurred       EKG's reviewed by Frank Castrejon DO. See ED course for independent interpretation.     Independent Interpretation   On my independent interpretation of chest x-ray, there is no    ED Course      Medications Administered   Medications   heparin (porcine) injection 1000 units/mL (rounds in 500 unit increments) (3,000 Units Intravenous $Given 7/26/25 1830)   ticagrelor (BRILINTA) tablet 180 mg (180 mg Oral $Given 7/26/25 1830)       Procedures   Procedures     Discussion of Management   See ED course    ED Course   ED Course as of 07/26/25 2157   Sat Jul 26, 2025   1758 EKG 12 lead  Posterior EKG.  STEMI.  Rate of 113.  .  QRS 80.  QTc 444.  ST elevation in posterior leads and V4 through V6 with reciprocal ST depressions in anterior leads in V1 through V3.  ST elevation in leads III and aVF with reciprocal depression in leads I and aVL.   1759 EKG 12 lead  STEMI.  Rate of 105.  IL 2 2.  .  QTc 489.  Acute ST elevation in leads II and aVF with reciprocal ST depression in leads I, aVL, and V2.   1802 I obtained history and performed a physical exam as noted above.    1810 I spoke with Dr. Hatfield of Cardiology regarding the patient's presentation and plan of care.    1821 I rechecked and updated the patient.        Additional Documentation  None    Medical Decision Making / Diagnosis     CMS Diagnoses: None    MIPS   None           [facility]   Aydin Blanc is a 80 year old male as described above presents to the emergency department with chest pain and found to have STEMI on twelve-lead EKG by EMS.  Patient hemodynamically stable at time of evaluation.  Chest pain-free after nitroglycerin by EMS.  Repeat EKG here in the ER consistent with inferior posterior STEMI. EKG demonstrates ST elevation in inferior leads with reciprocal ST depression in anterolateral leads.  A posterior EKG was also performed demonstrating ST elevation in  posterior leads.  As a result, Cath Lab was activated and case discussed with interventional cardiologist who agrees to take patient to Cath Lab for acute intervention.  Per consultation with cardiologist, given patient took Eliquis this morning, they recommend Brilinta and half dose heparin bolus.  Shortly prior to transfer to Cath Lab patient endorses history of aspirin allergy and has not recently taken aspirin and has not yet been desensitized to it recently.  Unclear if patient may potentially exhibit allergic reaction, will defer administration of aspirin to interventional cardiologist.  Chest x-ray reviewed prior to administration of heparin and Brilinta and there is no evidence of mediastinal widening to suggest dissection.  Discussed care plan with patient and wife at bedside who voiced understanding and agreement with plan.  Answered all questions.  Additional workup and orders as listed in chart.    Please refer to ED course above as part of continuation of MDM for details on the patient's treatment course and any potential changes or updates beyond my initial evaluation and MDM creation.    Critical Care  The critical condition was: ACS: STEMI/NSTEMI    Critical interventions performed or strongly considered: Arrange Definitive Care: OR/cath lab/neurointervention/IR/endoscopy/dialysis/ICU    Critical care time was 30 minutes exclusive of time spent on separately billable procedures.      Disposition   The patient  was transferred to cath lab.    Diagnosis     ICD-10-CM    1. ST elevation myocardial infarction (STEMI), unspecified artery (H)  I21.3 Case Request Cath Lab: Coronary Angiogram, Percutaneous Coronary Intervention     Case Request Cath Lab: Coronary Angiogram, Percutaneous Coronary Intervention      2. ST elevation MI (STEMI) (H)  I21.3 Cardiac Catheterization     Cardiac Catheterization      3. ST elevation myocardial infarction involving right coronary artery (H)  I21.11 Cardiac Rehab   Referral     ticagrelor (BRILINTA) 90 MG tablet           Discharge Medications   Current Discharge Medication List        START taking these medications    Details   ticagrelor (BRILINTA) 90 MG tablet Take 1 tablet (90 mg) by mouth 2 times daily. Dose to start this evening.  Qty: 180 tablet, Refills: 3    Associated Diagnoses: ST elevation myocardial infarction involving right coronary artery (H)               Scribe Disclosure:  Annabel LYONS, am serving as a scribe at 5:54 PM on 7/26/2025 to document services personally performed by Frank Castrejon DO based on my observations and the provider's statements to me.        Frank Castrejon DO  07/26/25 0910

## 2025-07-26 NOTE — Clinical Note
The first balloon was inserted into the right coronary artery.Max pressure = 4 izaiah. Total duration = 10 seconds.     Max pressure = 4 izaiah. Total duration = 10 seconds.    Balloon reinflated a second time: Max pressure = 4 izaiah. Total duration = 10 seconds.  Balloon reinflated a third time: Max pressure = 4 izaiah. Total duration = 10 seconds.  Balloon reinflated a fourth time: Max pressure = 4 izaiah. Total duration = 10 seconds.  Balloon rein flated a fourth time: Max pressure = 4 izaiah. Total duration = 10 seconds.

## 2025-07-26 NOTE — ED NOTES
Bed: [facility]  Expected date:   Expected time:   Means of arrival:   Comments:  Nathaly 1 80 M STEMI ALERT 147/90 hr 96 v1-v3 inferior

## 2025-07-26 NOTE — Clinical Note
The first balloon was inserted into the right coronary artery.Max pressure = 12 izaiah. Total duration = 17 seconds.     Max pressure = 12 izaiah. Total duration = 17 seconds.    Balloon reinflated a second time: Max pressure = 12 izaiah. Total duration = 17 seconds.  Balloon reinflated a third time: Max pressure = 14 izaiah. Total duration = 10 seconds.  Balloon reinflated a fourth time: Max pressure = 14 izaiah. Total duration = 9 seconds.

## 2025-07-26 NOTE — ED NOTES
At 1830, pt declined 81 mg ASA due to hx of allergy (throat swelling). Pt had reportedly built up a tolerance for 81 mg ASA, however pt reports he has not taken today and wife states pt has not taken for about 5 weeks, and so they expressed concern regarding taking it. ASA held. RN notified Dr. Castrejon. RN updated CV team.

## 2025-07-26 NOTE — Clinical Note
The first balloon was inserted into the right coronary artery.Max pressure = 5 izaiah. Total duration = 10 seconds.     Max pressure = 6 izaiah. Total duration = 10 seconds.    Balloon reinflated a second time: Max pressure = 6 izaiah. Total duration = 10 seconds.  Balloon reinflated a third time: Max pressure = 6 izaiah. Total duration = 10 seconds.

## 2025-07-26 NOTE — ED TRIAGE NOTES
Pt BIBA from independent living c/o CP after a bowel movement, EMS gave 1 dose nitroglycerin, , pt on eliquis, hx MI w/ stent, ABCD intact.       Triage Assessment (Adult)       Row Name 07/26/25 2756          Triage Assessment    Airway WDL WDL        Respiratory WDL    Respiratory WDL WDL        Skin Circulation/Temperature WDL    Skin Circulation/Temperature WDL WDL        Cardiac WDL    Cardiac WDL X;chest pain  CP        Peripheral/Neurovascular WDL    Peripheral Neurovascular WDL WDL        Cognitive/Neuro/Behavioral WDL    Cognitive/Neuro/Behavioral WDL WDL

## 2025-07-26 NOTE — Clinical Note
The first balloon was inserted into the right coronary artery.Max pressure = 14 izaiah. Total duration = 13 seconds.     Max pressure = 14 izaiah. Total duration = 9 seconds.    Balloon reinflated a second time: Max pressure = 14 izaiah. Total duration = 9 seconds.  Balloon reinflated a third time: Max pressure = 12 izaiah. Total duration = 15 seconds.  Balloon reinflated a fourth time: Max pressure = 12 izaiah. Total duration = 13 seconds.

## 2025-07-26 NOTE — CONSULTS
[facility]    Cardiology Consultation     Date of Admission:  7/26/2025    Assessment & Plan   Aydin Blanc is a 80 year old male who was admitted on 7/26/2025.    Acute inferior ST elevation MI status post successful PCI with with JEROME placement in the proximal and mid RCA  Hyperlipidemia  History of heart failure with reduced ejection fraction  Ascending aortic dilatation measuring 4.3 cm  History of paroxysmal SVT  History of DVT and PE  Esophageal cancer      PLAN:  Admit to CICU  Continue ticagrelor or Plavix for at least 12 months  Resume Eliquis tomorrow morning if the radial site is free of significant bleeding complications  Transthoracic cardiogram  Initiate rest of guideline directed medical therapy when feasible  Cardiac rehab       Fernando Hatfield MD, [facility]    High complexity       Primary Care Physician   Christopher Joshi    Reason for Consult   Reason for consult: Acute inferior ST elevation MI    History of Present Illness   Aydin Blanc is a 80 year old male with known coronary disease with prior inferior STEMI in 2017 status post RCA stenting, ischemic and nonischemic cardiomyopathy, paroxysmal VT status post ablation, hyperlipidemia, ascending aortic dilatation,  history of DVT and PE, history of tobacco use and esophageal cancer who developed severe substernal chest pain this afternoon while at his independent living facility.  Electrocardiogram in the field demonstrated ST elevation inferiorly.  He was given sublingual nitroglycerin with subsequent resolution of his symptoms.    The patient is currently pain-free and hemodynamically stable.  Repeat electrocardiogram in our system reveals inferior posterior ST elevation.  The patient underwent emergent coronary angiogram and percutaneous revascularization of the    Past Medical History   Past Medical History:   Diagnosis Date    Conductive hearing loss     Hearing problem     Tinnitus        Past Surgical  History   Past Surgical History:   Procedure Laterality Date    ENT SURGERY         Prior to Admission Medications   None     No current facility-administered medications for this encounter.     No current outpatient medications on file.     No current facility-administered medications for this encounter.     No current outpatient medications on file.     Allergies   Allergies   Allergen Reactions    Aspirin     Codeine     Fish Flavor [Flavoring Agent (Non-Screening)]     Morphine        Social History    reports that he has been smoking cigarettes. He started smoking about 76 years ago. He has a 500 pack-year smoking history. He does not have any smokeless tobacco history on file.      Family History   I have reviewed this patient's family history and updated it with pertinent information if needed.  Family History   Problem Relation Age of Onset    Cancer Brother           Review of Systems   A comprehensive review of system was performed and is negative other than that noted in the HPI or here.     Physical Exam   Vital Signs with Ranges  Temp:  [98.9  F (37.2  C)] 98.9  F (37.2  C)  Pulse:  [] 84  Resp:  [16-21] 21  BP: (110-141)/() 141/108  SpO2:  [85 %-97 %] 97 %  Wt Readings from Last 4 Encounters:   07/26/25 89.8 kg (198 lb)   01/31/17 101.6 kg (224 lb)     No intake/output data recorded.      Vitals: BP (!) 141/108   Pulse 84   Temp 98.9  F (37.2  C) (Temporal)   Resp 21   Wt 89.8 kg (198 lb)   SpO2 97%   BMI 29.67 kg/m      Physical Exam:   General - Alert and oriented to time place and person in no acute distress  Eyes - No scleral icterus  HEENT - Neck supple, moist mucous membranes  Cardiovascular - RRR, no murmurs  Extremities - There is no edema  Respiratory - Clear  Skin - No pallor or cyanosis  Gastrointestinal - Non tender and non distended without rebound or guarding  Psych - Appropriate affect   Neurological - No gross motor neurological focal deficits    No lab results found  "in last 7 days.    Invalid input(s): \"TROPONINIES\"    Recent Labs   Lab 07/26/25  1805   WBC 9.8   HGB 11.1*   MCV 85        No results for input(s): \"CHOL\", \"HDL\", \"LDL\", \"TRIG\", \"CHOLHDLRATIO\" in the last 65635 hours.  Recent Labs   Lab 07/26/25  1805   WBC 9.8   HGB 11.1*   HCT 34.5*   MCV 85        No results for input(s): \"PH\", \"PHV\", \"PO2\", \"PO2V\", \"SAT\", \"PCO2\", \"PCO2V\", \"HCO3\", \"HCO3V\" in the last 168 hours.  No results for input(s): \"NTBNPI\", \"NTBNP\" in the last 168 hours.  No results for input(s): \"DD\" in the last 168 hours.  No results for input(s): \"SED\", \"CRP\" in the last 168 hours.  Recent Labs   Lab 07/26/25  1805        No results for input(s): \"TSH\" in the last 168 hours.  No results for input(s): \"COLOR\", \"APPEARANCE\", \"URINEGLC\", \"URINEBILI\", \"URINEKETONE\", \"SG\", \"UBLD\", \"URINEPH\", \"PROTEIN\", \"UROBILINOGEN\", \"NITRITE\", \"LEUKEST\", \"RBCU\", \"WBCU\" in the last 168 hours.    Imaging:  No results found for this or any previous visit (from the past 48 hours).    Echo:  No results found for this or any previous visit (from the past 4320 hours).    Clinically Significant Risk Factors Present on Admission                                                "

## 2025-07-26 NOTE — Clinical Note
The first balloon was inserted into the right coronary artery.Max pressure = 12 izaiah. Total duration = 14 seconds.     Max pressure = 12 izaiah. Total duration = 17 seconds.    Balloon reinflated a second time: Max pressure = 12 izaiah. Total duration = 17 seconds.  Balloon reinflated a third time: Max pressure = 12 izaiah. Total duration = 18 seconds.  Balloon reinflated a fourth time: Max pressure = 12 izaiah. Total duration = 12 seconds.  Balloon  reinflated a fourth time: Max pressure = 12 izaiah. Total duration = 10 seconds.

## 2025-07-26 NOTE — Clinical Note
Stent deployed in the right coronary artery. Max pressure = 12 izaiah. Total duration = 15 seconds. Balloon reinflated a second time: Max pressure = 12 izaiah. Total duration = 12 seconds. 12 for 10 seconds on 3rd inflation

## 2025-07-26 NOTE — Clinical Note
1. Start metformin for type 2 DM    2. Prescribed omeprazole for the GERD    3. Blood tests today    4. Daily walking     5. Follow up 3 months.    LCA Cine(s)  injected utilizing power injector system.

## 2025-07-27 ENCOUNTER — APPOINTMENT (OUTPATIENT)
Dept: CARDIOLOGY | Facility: CLINIC | Age: 80
End: 2025-07-27
Attending: INTERNAL MEDICINE
Payer: COMMERCIAL

## 2025-07-27 ENCOUNTER — APPOINTMENT (OUTPATIENT)
Dept: OCCUPATIONAL THERAPY | Facility: CLINIC | Age: 80
DRG: 323 | End: 2025-07-27
Attending: INTERNAL MEDICINE
Payer: COMMERCIAL

## 2025-07-27 LAB
CHOLEST SERPL-MCNC: 148 MG/DL
HDLC SERPL-MCNC: 44 MG/DL
LDLC SERPL CALC-MCNC: 94 MG/DL
LVEF ECHO: NORMAL
NONHDLC SERPL-MCNC: 104 MG/DL
TRIGL SERPL-MCNC: 49 MG/DL
TROPONIN T SERPL HS-MCNC: 152 NG/L
TROPONIN T SERPL HS-MCNC: 180 NG/L

## 2025-07-27 PROCEDURE — 97166 OT EVAL MOD COMPLEX 45 MIN: CPT | Mod: GO

## 2025-07-27 PROCEDURE — 93005 ELECTROCARDIOGRAM TRACING: CPT

## 2025-07-27 PROCEDURE — C8929 TTE W OR WO FOL WCON,DOPPLER: HCPCS

## 2025-07-27 PROCEDURE — 250N000013 HC RX MED GY IP 250 OP 250 PS 637: Performed by: STUDENT IN AN ORGANIZED HEALTH CARE EDUCATION/TRAINING PROGRAM

## 2025-07-27 PROCEDURE — 258N000003 HC RX IP 258 OP 636: Performed by: INTERNAL MEDICINE

## 2025-07-27 PROCEDURE — 97530 THERAPEUTIC ACTIVITIES: CPT | Mod: GO

## 2025-07-27 PROCEDURE — 250N000013 HC RX MED GY IP 250 OP 250 PS 637: Performed by: INTERNAL MEDICINE

## 2025-07-27 PROCEDURE — 210N000002 HC R&B HEART CARE

## 2025-07-27 PROCEDURE — 250N000011 HC RX IP 250 OP 636: Performed by: INTERNAL MEDICINE

## 2025-07-27 PROCEDURE — 36415 COLL VENOUS BLD VENIPUNCTURE: CPT | Performed by: INTERNAL MEDICINE

## 2025-07-27 PROCEDURE — 999N000208 ECHOCARDIOGRAM COMPLETE

## 2025-07-27 PROCEDURE — 255N000002 HC RX 255 OP 636: Performed by: INTERNAL MEDICINE

## 2025-07-27 PROCEDURE — 84484 ASSAY OF TROPONIN QUANT: CPT | Performed by: EMERGENCY MEDICINE

## 2025-07-27 PROCEDURE — 99232 SBSQ HOSP IP/OBS MODERATE 35: CPT | Performed by: STUDENT IN AN ORGANIZED HEALTH CARE EDUCATION/TRAINING PROGRAM

## 2025-07-27 PROCEDURE — 93306 TTE W/DOPPLER COMPLETE: CPT | Mod: 26 | Performed by: INTERNAL MEDICINE

## 2025-07-27 PROCEDURE — 84484 ASSAY OF TROPONIN QUANT: CPT | Performed by: INTERNAL MEDICINE

## 2025-07-27 RX ORDER — METOPROLOL SUCCINATE 25 MG/1
25 TABLET, EXTENDED RELEASE ORAL DAILY
Status: DISPENSED | OUTPATIENT
Start: 2025-07-27

## 2025-07-27 RX ORDER — ACETAMINOPHEN 500 MG
1000 TABLET ORAL EVERY 6 HOURS PRN
Status: DISPENSED | OUTPATIENT
Start: 2025-07-27

## 2025-07-27 RX ORDER — HEPARIN SODIUM,PORCINE 10 UNIT/ML
5-10 VIAL (ML) INTRAVENOUS
Status: DISPENSED | OUTPATIENT
Start: 2025-07-27

## 2025-07-27 RX ORDER — HEPARIN SODIUM,PORCINE 10 UNIT/ML
5-10 VIAL (ML) INTRAVENOUS EVERY 24 HOURS
Status: DISPENSED | OUTPATIENT
Start: 2025-07-27

## 2025-07-27 RX ORDER — HEPARIN SODIUM (PORCINE) LOCK FLUSH IV SOLN 100 UNIT/ML 100 UNIT/ML
5-10 SOLUTION INTRAVENOUS
Status: ACTIVE | OUTPATIENT
Start: 2025-07-27

## 2025-07-27 RX ORDER — ATORVASTATIN CALCIUM 80 MG/1
80 TABLET, FILM COATED ORAL EVERY EVENING
Status: DISPENSED | OUTPATIENT
Start: 2025-07-27

## 2025-07-27 RX ADMIN — SODIUM CHLORIDE, SODIUM LACTATE, POTASSIUM CHLORIDE, AND CALCIUM CHLORIDE 250 ML: .6; .31; .03; .02 INJECTION, SOLUTION INTRAVENOUS at 12:30

## 2025-07-27 RX ADMIN — METOPROLOL SUCCINATE 25 MG: 25 TABLET, EXTENDED RELEASE ORAL at 13:51

## 2025-07-27 RX ADMIN — TICAGRELOR 90 MG: 90 TABLET, FILM COATED ORAL at 12:27

## 2025-07-27 RX ADMIN — TICAGRELOR 90 MG: 90 TABLET, FILM COATED ORAL at 00:18

## 2025-07-27 RX ADMIN — ATORVASTATIN CALCIUM 80 MG: 80 TABLET, FILM COATED ORAL at 20:34

## 2025-07-27 RX ADMIN — APIXABAN 5 MG: 5 TABLET, FILM COATED ORAL at 08:47

## 2025-07-27 RX ADMIN — PERFLUTREN 2 ML (DILUTED): 6.52 INJECTION, SUSPENSION INTRAVENOUS at 14:07

## 2025-07-27 RX ADMIN — ACETAMINOPHEN 1000 MG: 500 TABLET, FILM COATED ORAL at 13:50

## 2025-07-27 RX ADMIN — APIXABAN 5 MG: 5 TABLET, FILM COATED ORAL at 20:34

## 2025-07-27 RX ADMIN — Medication 5 ML: at 06:29

## 2025-07-27 ASSESSMENT — ACTIVITIES OF DAILY LIVING (ADL)
ADLS_ACUITY_SCORE: 45
ADLS_ACUITY_SCORE: 46
ADLS_ACUITY_SCORE: 46
ADLS_ACUITY_SCORE: 41
ADLS_ACUITY_SCORE: 43
ADLS_ACUITY_SCORE: 41
ADLS_ACUITY_SCORE: 46
ADLS_ACUITY_SCORE: 45
ADLS_ACUITY_SCORE: 45
ADLS_ACUITY_SCORE: 41
ADLS_ACUITY_SCORE: 47
ADLS_ACUITY_SCORE: 46
ADLS_ACUITY_SCORE: 46
ADLS_ACUITY_SCORE: 47
ADLS_ACUITY_SCORE: 43
ADLS_ACUITY_SCORE: 45
ADLS_ACUITY_SCORE: 45
ADLS_ACUITY_SCORE: 43
ADLS_ACUITY_SCORE: 45
ADLS_ACUITY_SCORE: 46
ADLS_ACUITY_SCORE: 46

## 2025-07-27 NOTE — ED NOTES
Pt's wife and daughter brought to CV lab waiting area in [facility]. Family given phone numbers for updates. CV team notified.

## 2025-07-27 NOTE — PROGRESS NOTES
[facility] Cardiology Progress Note    Date of Admission:  7/26/2025    Subjective   Mr. Blanc is doing well and has no complaints.  He denies any chest pain or dyspnea since admission.  He has been ambulating without limitation.    Objective   INTAKE / OUTPUT     Intake/Output Summary (Last 24 hours) at 7/27/2025 1036  Last data filed at 7/27/2025 0650  Gross per 24 hour   Intake --   Output 1050 ml   Net -1050 ml       VITAL SIGNS  Temp:  [97.5  F (36.4  C)-98.9  F (37.2  C)] 97.6  F (36.4  C)  Pulse:  [] 90  Resp:  [12-24] 16  BP: (107-141)/() 110/71  SpO2:  [85 %-99 %] 94 %  193 lbs 8 oz    PHYSICAL EXAM   Constitutional: Appears stated age, well nourished, NAD. On NC  Neck: Supple.  JVD not visualized.   Respiratory: Non-labored. Lungs CTAB.  Cardiovascular: RRR. Bilateral lower extremities with trace edema.   GI: Soft, non-distended, non-tender.  Skin: Warm and dry.   Musculoskeletal/Extremities: Symmetrical movement.  Neurologic: No gross focal deficits. Alert, awake.  Psychiatric: Affect appropriate. Mentation normal.    Data   Most Recent 3 CBC's:  Recent Labs   Lab Test 07/26/25  1805   WBC 9.8   HGB 11.1*   MCV 85        Most Recent 3  BMP's:  Recent Labs   Lab Test 07/26/25  2106 07/26/25  1805   NA  --  136   POTASSIUM  --  4.5   CHLORIDE  --  99   CO2  --  21*   BUN  --  26.4*   CR  --  0.77   ANIONGAP  --  16*   GAGE  --  9.9   * 168*     Most Recent 3 BNP's:No lab results found.   Most Recent Cholesterol Panel:  Recent Labs   Lab Test 07/26/25  2120   CHOL 148   LDL 94   HDL 44   TRIG 49       Most Recent Transthoracic Echocardiogram:  No results found.    Most Recent Cardiac Catheterization:  Cardiac Catheterization  Result Date: 7/26/2025  Acute inferior ST elevation MI  Status post successful PCI with intravascular lithotripsy followed by 2   drug-eluting stent placements in the proximal and mid RCA    Assessment & Plan      Mr. Blanc is an 80-year-old male with history as noted below who was admitted with with an inferior STEMI.  He underwent emergent coronary angiogram on 7/26 which showed critical proximal and mid RCA stenosis for which he underwent successful lithotripsy and PCI with ELTON-3 flow.  He has remained hemodynamically stable overnight without any ventricular arrhythmias.  No chest pain since admission.  Echocardiogram ordered for today.  We will continue to monitor and optimize medical therapy.    Patient notes that he was scheduled for an elective lipoma removal on his upper back in the next few weeks as this causes him significant discomfort.  Okay to proceed with this if anticoagulation and antiplatelets can be continued without cessation.  If cessation required, this will need to be delayed for at least 6 months.    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  GDMT  Losartan 50 mg daily  Metoprolol succinate 25 mg daily  Ascending aortic dilatation measuring 4.3 cm  History of paroxysmal SVT  History of DVT and PE  Currently on Eliquis 5 mg twice daily  Esophageal cancer  Currently undergoing chemotherapy, last infusion on 7/25    Plan     Continue Eliquis and Brilinta for 12 months  Continue Eliquis indefinitely thereafter  Follow-up TTE  Start metoprolol succinate 25 mg daily  Consider starting low-dose losartan based on blood pressure during admission  Increase atorvastatin to 80 mg daily (goal LDL <55)  May require Zetia +/- [id number] as an outpatient  Cardiac rehabilitation  Defer outpatient elective procedures for at least 6 months (ideally 12 months) if cessation of anticoagulation/antiplatelets required  We will establish close  outpatient follow-up with cardiology  He will need coordination with his oncologist regarding ongoing chemotherapy and immunotherapy in the setting of recent STEMI    Thank you for involving us in the care of this patient.  We will continue to follow.    Total time spent today: 45 minutes    Abdelrahman Hodges MD

## 2025-07-27 NOTE — PROGRESS NOTES
Cardiology Progress Note          Assessment and Plan:         80 year old male with known coronary disease with prior inferior STEMI in 2017 status post RCA stenting, ischemic and nonischemic cardiomyopathy, paroxysmal VT status post ablation, hyperlipidemia, ascending aortic dilatation,  history of DVT and PE, history of tobacco use and esophageal cancer (on chemotherapy) who was admitted on 7/26/2025 with an acute inferior ST elevation myocardial infarction.    He is status post PCI with drug-eluting stent placement x 2 to the proximal/mid right coronary artery.  Echocardiography this admission demonstrated an LVEF of 50 to 55%.  Unfortunately he was transferred to the ICU earlier this morning after experiencing hematemesis with associated hypotension.  EGD demonstrated a clot at the distal stent but no evidence of active bleeding.  All anticoagulant/antiplatelet therapy is on hold.    IMPRESSION:    Acute inferior ST elevation myocardial infarction status post drug-eluting stent placement x 2 to the right coronary artery.  History of DVT/PE.  Upper GI bleed earlier this morning.  Hemoglobin 9.1 this morning from 7.6 at 1:25 AM.  Was 11.12 days ago.  History of esophageal cancer, recently started on chemotherapy.  Moderate dilatation of the ascending thoracic aorta on echocardiography this admission.  History of anaphylaxis with aspirin.    PLAN    - Hemodynamics have stabilized.  Levophed is on hold.  Hemoglobin has improved from earlier this morning.  - This is a very difficult situation as regards to his antiplatelet/anticoagulant therapy.  The most important drug to restart when permissible from a GI standpoint would be his Brilinta, particularly in the setting of a recent acute ST elevation myocardial infarction.  He is also prone to recurrent thrombotic events given his history of esophageal malignancy but at this time antiplatelet therapy takes precedence.  - Will clarify timing of resumption of  antiplatelet therapy with GI this morning.  Hopefully if he remains hemodynamically stable without further hematemesis this will be relatively soon.    I personally examined and evaluated the patient today.  Aydin Blanc was in (or remains in) critical condition today due to recent acute inferior myocardial infarction and hemorrhagic shock.  I spent a total of 31 minutes providing critical care services at the bedside, evaluating the patient, directing care and reviewing laboratory values and radiologic reports.          Interval History:     Events from earlier this morning noted.  No recurrent chest discomfort.  Hemodynamics have improved.  Levophed has on hold currently.  Hemoglobin has stabilized.             Review of Systems:   As per subjective, otherwise 5 systems reviewed and negative.           Physical Exam:   Blood pressure 133/66, pulse 68, temperature 97.6  F (36.4  C), temperature source Oral, resp. rate 16, weight 87.8 kg (193 lb 8 oz), SpO2 94%.      Vital Sign Ranges  Temperature Temp  Av.9  F (36.6  C)  Min: 97.5  F (36.4  C)  Max: 98.9  F (37.2  C)   Blood pressure Systolic (24hrs), Av , Min:86 , Max:144        Diastolic (24hrs), Av, Min:60, Max:108      Pulse Pulse  Av.3  Min: 52  Max: 107   Respirations Resp  Av.4  Min: 12  Max: 24   Pulse oximetry SpO2  Av.9 %  Min: 85 %  Max: 99 %         Intake/Output Summary (Last 24 hours) at 2025 1552  Last data filed at 2025 1140  Gross per 24 hour   Intake --   Output 2350 ml   Net -2350 ml       Constitutional: NAD  Skin: Warm and dry  Neck: No JVD  Lungs: CTA  Cardiovascular: RRR, no m/r/g  Abdomen: Soft, non tender.  Extremities and Back: No LE edema  Neurological: Nonfocal           Medications:     I have reviewed this patient's current medications.              Data:     Labs reviewed.           Kirby Clay MD, M.D.

## 2025-07-27 NOTE — PLAN OF CARE
Pt aox4, A1gbw, RA and full code. Tele SR. Pt denies CP and SOB. STEMI overnight night w/ 2 stents to RCA. Rt radial WDL, soft and CMS intact. PT had 13 beat of VT. Cards aware and added metoprolol. Pt also hypotensive and dizzy during ep of VT. 250 ml bolus given. Pt has rt chest port and on chemo precautions.   Plan: Continue brilinta and eliquis. cards rehab and possible discharge tomorrow.

## 2025-07-27 NOTE — H&P
[facility] [place]    History and Physical - Hospitalist Service       Date of Admission:  7/26/2025    Assessment & Plan   Aydin Blnac is a 80 year old male with a history of MI, CAD s/p stent placement, DVT, PE anticoagulated with Eliquis, NSVT, and esophageal adenocarcinoma who presents to the ED via EMS with chest pressure with STEMI in the inferior leads with EMS.  Patient was taken to cath lab and noted to have 99% mid RCA disease that is heavily calcified.  He underwent PCI with 2 stent placement.  He has been started on Brillinta and did not receive asa due to allergies.    ## Acute inferior wall STEMI  Patient is currently undergoing chemotherapy and completed his infusion this morning.  He presented with chest pain and EKG changes initial troponin 22 with suspected inferior wall MI.  He underwent emergent LHC with mid RCA disease at 99% heavily calcified and underwent JEROME x 2.  Admit to inpatient cardiology floor  Started on Brillinta, asa not given in setting of allergies  TTE in AM  Continue telemetry monitoring    ## Hx of DVT/PE  Patient last Eliquis was this morning of admission  Resume Eliquis in AM    ## Hx of Esophageal Cancer  Patient is followed by [facility] and recently underwent chemotherapy infusion until this morning.     ## Urinary retention  Patient was retaining > 800 ml  Will place morrissey catheter    Diet: cardiac diet  DVT Prophylaxis: Pneumatic Compression Devices  Morrissey Catheter: Not present  Lines: PRESENT             Cardiac Monitoring: None  Code Status:  Full    Clinically Significant Risk Factors Present on Admission     # Coagulation Defect: INR = 1.21 (Ref range: 0.85 - 1.15) and/or PTT = 30 Seconds (Ref range: 22 - 38 Seconds), will monitor for bleeding                         Disposition Plan     Medically Ready for Discharge: Anticipated in 2-4 Days           Christopher Shah MD  Hospitalist Service  [facility]  [facility]  Securely message with [employer] (more info)  Text page via [facility] Paging/Directory     ______________________________________________________________________    Chief Complaint   Chest pain    History is obtained from the patient, electronic health record, and Cardiologist    History of Present Illness   Aydin Blanc is a 80 year old male with a history of MI, CAD s/p stent placement, DVT, PE anticoagulated with Eliquis, NSVT, and esophageal adenocarcinoma who presents to the ED via EMS from his independent living facility. The patient states he started to experience chest pressure while watching TV shortly after straining for a bowel movement just prior to arrival. He is unsure if his pain was worse with exertion. He called EMS at that point. They noted a blood pressure of 147/90 and  STEMI in the inferior leads on EKG on arrival about. They administered nitroglycerin about 30-45 minutes after symptom onset with complete relief of his chest pain. Reports a blood glucose of 176. Patient notes a prior MI with stent placement and current known DVT/PE to bilateral upper extremities, liver, and lungs. He is currently on Eliquis for these conditions. Taking as directed, no missed doses, last taken at 0900 today. Reports an anaphylactic response to aspirin but he is able to tolerate 81 mg if needed. States he has a history of esophageal cancer and had a routine follow-up appointment today for this. Denies shortness of breath, nausea, vomiting, diarrhea, abdominal pain, lightheadedness, or dizziness.     Patient was taken to cath lab and noted to have 99% mid RCA disease that is heavily calcified.  He underwent PCI with 2 stent placement.  He has been started on Brillinta and did not receive asa.  He can resume his Eliquis again starting tomorrow.    Past Medical History    Past Medical History:   Diagnosis Date    Conductive hearing loss     Hearing problem     Tinnitus        Past Surgical History   Past  Surgical History:   Procedure Laterality Date    ENT SURGERY         Prior to Admission Medications   None           Physical Exam   Vital Signs: Temp: 98.9  F (37.2  C) Temp src: Temporal BP: 129/78 Pulse: 73   Resp: 15 SpO2: 99 % O2 Device: Nasal cannula Oxygen Delivery: 2 LPM  Weight: 198 lbs 0 oz    General Appearance: NAD  Respiratory: CTA  Cardiovascular: RRR  GI: Soft, NT, ND  Skin: warm and dry  Other: Non focal     Medical Decision Making       63 MINUTES SPENT BY ME on the date of service doing chart review, history, exam, documentation & further activities per the note.      Data     I have personally reviewed the following data over the past 24 hrs:    9.8  \   11.1 (L)   / 312     136 99 26.4 (H) /  168 (H)   4.5 21 (L) 0.77 \     Trop: 22 BNP: N/A     INR:  1.21 (H) PTT:  30   D-dimer:  N/A Fibrinogen:  N/A       Imaging results reviewed over the past 24 hrs:   Recent Results (from the past 24 hours)   XR Chest Port 1 View    Narrative    EXAM: XR CHEST PORT 1 VIEW  LOCATION: [facility]  DATE: 7/26/2025    INDICATION: chest pressure  COMPARISON: 7/14/2025 CT chest.      Impression    IMPRESSION: No evidence for CHF or pneumonia. Normal heart size. No pleural effusion or pneumothorax. Stable right IJ Port-A-Cath. Stable stent in the distal esophagus.

## 2025-07-27 NOTE — PROGRESS NOTES
[facility]    Medicine Progress Note - Hospitalist Service    Date of Admission:  7/26/2025  Date of Service: 07/27/2025    Assessment & Plan     Aydin Blanc is a 80 year old male with a history of MI, CAD s/p stent placement, DVT, PE anticoagulated with Eliquis, NSVT, and esophageal adenocarcinoma who presents to the ED via EMS with chest pressure with STEMI in the inferior leads with EMS.  Patient was taken to cath lab and noted to have 99% mid RCA disease that is heavily calcified.  He underwent PCI with 2 stent placement.  He has been started on Brillinta and did not receive asa due to allergies.    ## Acute inferior wall STEMI  # HLD  Patient is currently undergoing chemotherapy and completed his infusion this morning.  He presented with chest pain and EKG changes initial troponin 22 with suspected inferior wall MI.  He underwent emergent LHC with mid RCA disease at 99% heavily calcified and underwent JEROME x 2.  Continue Eliquis and Brilinta for 12 months  Continue Eliquis indefinitely thereafter  Started on Brillinta, asa not given in setting of allergies  TTE -> Left ventricular systolic function is low normal.  The visual ejection fraction is 50-55%.  Continue telemetry monitoring  metoprolol succinate 25 mg daily   Increase atorvastatin to 80 mg daily (goal LDL <55)     ## Hx of DVT/PE  Patient last Eliquis was this morning of admission  Resumed Eliquis    ## Hx of Esophageal Cancer  Patient is followed by [facility] and recently underwent chemotherapy infusion until this morning.     ## Urinary retention  Patient was retaining > 800 ml  Will place morrissey catheter    Diet: cardiac diet  DVT Prophylaxis: Pneumatic Compression Devices  Morrissey Catheter: Not present  Lines: PRESENT             Cardiac Monitoring: None  Code Status:  Full          Diet: Combination Diet Low Saturated Fat Na <2400mg Diet    DVT Prophylaxis: Pneumatic Compression Devices  Morrissey Catheter: Not  present  Lines: PRESENT      Port a Cath 07/26/25 Right Chest wall-Site Assessment: WDL      Cardiac Monitoring: ACTIVE order. Indication: AMI (NSTEMI/ STEMI) (48 hours)  Code Status: Full Code      Clinically Significant Risk Factors Present on Admission                # Drug Induced Coagulation Defect: home medication list includes an anticoagulant medication                         Social Drivers of Health    Tobacco Use: Medium Risk (7/18/2025)    Received from [facility]    Patient History     Smoking Tobacco Use: Former     Smokeless Tobacco Use: Never          Disposition Plan     Medically Ready for Discharge: Anticipated Tomorrow             Shane Linder MD  Hospitalist Service  [facility]  Securely message with [employer] (more info)  Text page via [employer] Paging/Directory   ______________________________________________________________________    Interval History     No CP/SOB this afternoon  No fevers  No nausea / vomiting   No new complaints    Physical Exam   Vital Signs: Temp: 97.6  F (36.4  C) Temp src: Oral BP: 133/66 Pulse: 68   Resp: 16 SpO2: 94 % O2 Device: None (Room air) Oxygen Delivery: 2 LPM  Weight: 193 lbs 8 oz    Constitutional: awake, alert, cooperative, no apparent distress.   Respiratory: CTABL   Cardiovascular: RRR with no m/r/g   GI: Normal bowel sounds, soft, non-distended, non-tender. Skin: normal skin color, texture, turgor   Musculoskeletal: There is no redness, warmth, or swelling of the joints. Full range of motion noted.   Neurologic: Awake, alert, oriented to name, place and time. Gregory. Motor is 5 out of 5 bilaterally. Sensory is intact.   Neuropsychiatric: normal mood and affect    ----------------------------------------------------------------------------------------    Medical Decision Making       35 MINUTES SPENT BY ME on the date of service doing chart review, history, exam, documentation & further activities per the  note.      Data   ------------------------- PAST 24 HR DATA REVIEWED -----------------------------------------------    I have personally reviewed the following data over the past 24 hrs:    9.8  \   11.1 (L)   / 312     136 99 26.4 (H) /  145 (H)   4.5 21 (L) 0.77 \     Trop: 152 (HH) BNP: N/A     TSH: 0.77 T4: N/A A1C: N/A     INR:  1.21 (H) PTT:  30   D-dimer:  N/A Fibrinogen:  N/A       Imaging results reviewed over the past 24 hrs:   Recent Results (from the past 24 hours)   XR Chest Port 1 View    Narrative    EXAM: XR CHEST PORT 1 VIEW  LOCATION: [facility]  DATE: 2025    INDICATION: chest pressure  COMPARISON: 2025 CT chest.      Impression    IMPRESSION: No evidence for CHF or pneumonia. Normal heart size. No pleural effusion or pneumothorax. Stable right IJ Port-A-Cath. Stable stent in the distal esophagus.   Cardiac Catheterization    Narrative    Acute inferior ST elevation MI  Status post successful PCI with intravascular lithotripsy followed by 2   drug-eluting stent placements in the proximal and mid RCA     Echocardiogram Complete   Result Value    LVEF  50-55%    Narrative    [id number]  [id number]  [id number]  [id number]^MADIE^CAROLYN^SAL     [facility]  Echocardiography Laboratory  [address] [place] [address]     Name: SRINIVAS WRIGHT  MRN: [id number]  : 1945  Study Date: 2025 12:58 PM  Age: 80 yrs  Gender: Male  Patient Location: [facility]  Reason For Study: Acute Myocardial Infarction  Ordering Physician: CAROLYN RAMACHANDRAN  Performed By: ROS Zhong     BSA: 2.0 m2  Height: 68 in  Weight: 193 lb  HR: 72  BP: 144/72 mmHg  ______________________________________________________________________________  Procedure  Echocardiogram with two-dimensional, color and spectral Doppler. Definity (NDC  #12453-559) given intravenously. Contrast Definity. Technically  difficult  study.  ______________________________________________________________________________  Interpretation Summary     Left ventricular systolic function is low normal.  The visual ejection fraction is 50-55%.  Aortic root is measured 4.7cm, Asc Ao is 4.6cm Technically difficult,  suboptimal study. There is no comparison study available.  ______________________________________________________________________________  Left Ventricle  The left ventricle is normal in size. Left ventricular systolic function is  low normal. The visual ejection fraction is 50-55%. Diastolic Doppler findings  (E/E' ratio and/or other parameters) suggest left ventricular filling  pressures are normal. No regional wall motion abnormalities noted.     Right Ventricle  The right ventricle is grossly normal size. Right ventricular function cannot  be assessed due to poor image quality.     Atria  The left atrium is severely dilated. Right atrium not well visualized. The  atrial septum is aneurysmal.     Mitral Valve  The mitral valve is normal in structure and function. There is trace mitral  regurgitation. There is no mitral valve stenosis.     Tricuspid Valve  The tricuspid valve is not well visualized. Right ventricular systolic  pressure could not be approximated due to inadequate tricuspid regurgitation.     Aortic Valve  The aortic valve is trileaflet. No aortic regurgitation is present. No  hemodynamically significant valvular aortic stenosis.     Pulmonic Valve  The pulmonic valve is not well visualized. Normal pulmonic valve velocity.     Vessels  The aortic Sinus(es) of Valsalva are moderately dilated. Ascending aorta  dilatation is present.     Pericardium  The pericardium is not well visualized.     ______________________________________________________________________________  MMode/2D Measurements & Calculations  IVSd: 1.0 cm  LVIDd: 4.5 cm  LVIDs: 2.9 cm  LVPWd: 1.0 cm  FS: 35.6 %     LV mass(C)d: 153.3 grams  LV  mass(C)dI: 76.1 grams/m2  Ao root diam: 4.7 cm  asc Aorta Diam: 4.6 cm  LVOT diam: 2.3 cm  LVOT area: 4.2 cm2  Ao root diam index Ht(cm/m): 2.7  Ao root diam index BSA (cm/m2): 2.3  Asc Ao diam index BSA (cm/m2): 2.3  Asc Ao diam index Ht(cm/m): 2.7  EF Biplane: 42.5 %  LA Volume (BP): 71.0 ml     LA Volume Index (BP): 35.3 ml/m2  RWT: 0.44  TAPSE: 1.5 cm     Doppler Measurements & Calculations  MV E max moises: 39.1 cm/sec  MV A max moises: 62.1 cm/sec  MV E/A: 0.63  MV dec slope: 133.0 cm/sec2  MV dec time: 0.29 sec  PA acc time: 0.07 sec  E/E' av.8  Lateral E/e': 6.3  Medial E/e': 7.3     ______________________________________________________________________________  Report approved by: Danna Moffett MD on 2025 02:46 PM           ------------------------- ENCOUNTER LABS ----------------------------------------------------------------  Recent Study2gether   Lab 25  1805   WBC  --  9.8   HGB  --  11.1*   MCV  --  85   PLT  --  312   INR  --  1.21*   NA  --  136   POTASSIUM  --  4.5   CHLORIDE  --  99   CO2  --  21*   BUN  --  26.4*   CR  --  0.77   ANIONGAP  --  16*   GAGE  --  9.9   * 168*       Most Recent 3 CBC's:  Recent Labs   Lab Test 25  180   WBC 9.8   HGB 11.1*   MCV 85        Most Recent 3 BMP's:  Recent Labs   Lab Test 25  180   NA  --  136   POTASSIUM  --  4.5   CHLORIDE  --  99   CO2  --  21*   BUN  --  26.4*   CR  --  0.77   ANIONGAP  --  16*   GAGE  --  9.9   * 168*     Most Recent 2 LFT's:No lab results found.  Most Recent 3 INR's:  Recent Labs   Lab Test 25  180   INR 1.21*     Most Recent 3 Troponin's:No lab results found.  Most Recent 3 BNP's:No lab results found.  Most Recent D-dimer:No lab results found.  Most Recent Cholesterol Panel:  Recent Labs   Lab Test 25   CHOL 148   LDL 94   HDL 44   TRIG 49     Most Recent 6 Bacteria Isolates From Any Culture (See EPIC Reports for Culture Details):No lab results  found.  Most Recent TSH and T4:  Recent Labs   Lab Test 07/26/25 2120   TSH 0.77     Most Recent Hemoglobin A1c:No lab results found.  Most Recent Urinalysis:No lab results found.  Most Recent ESR & CRP:No lab results found.

## 2025-07-27 NOTE — PROGRESS NOTES
07/27/25 1000   Appointment Info   Signing Clinician's Name / Credentials (OT) Lorna Cuba, OTR/L   Rehab Comments (OT) Initial Cardiac and OT Eval   Living Environment   Living Environment Comments independent living apartment with wife present with him at all times. walk in shower. no stairs.   Self-Care   Activity/Exercise/Self-Care Comment indp. no seat in shower.   Instrumental Activities of Daily Living (IADL)   IADL Comments indp. retired. works as golf starter   General Information   Onset of Illness/Injury or Date of Surgery 07/26/25   Referring Physician Fernando Hatfield MD   Patient/Family Therapy Goal Statement (OT) to go home   Additional Occupational Profile Info/Pertinent History of Current Problem Aydin Blanc is a 80 year old male with a history of MI, CAD s/p stent placement, DVT, PE anticoagulated with Eliquis, NSVT, and esophageal adenocarcinoma who presents to the ED via EMS with chest pressure with STEMI in the inferior leads with EMS.  Patient was taken to cath lab and noted to have 99% mid RCA disease that is heavily calcified.  He underwent PCI with 2 stent placement.  He has been started on Brillinta and did not receive asa due to allergies.   Existing Precautions/Restrictions cardiac;weight bearing   General Observations and Info R site NWB at this time for wrist. cardiac STEMI   Cognitive Status Examination   Orientation Status orientation to person, place and time   Visual Perception   Visual Impairment/Limitations WNL   Sensory   Sensory Quick Adds sensation intact   Pain Assessment   Patient Currently in Pain Yes, see Vital Sign flowsheet   Posture   Posture not impaired   Range of Motion Comprehensive   General Range of Motion no range of motion deficits identified   Strength Comprehensive (MMT)   General Manual Muscle Testing (MMT) Assessment no strength deficits identified   Muscle Tone Assessment   Muscle Tone Quick Adds No deficits were identified   Coordination    Upper Extremity Coordination No deficits were identified   Bed Mobility   Bed Mobility supine-sit   Supine-Sit [place] (Bed Mobility) minimum assist (75% patient effort)   Transfers   Transfers sit-stand transfer   Sit-Stand Transfer   Sit-Stand [place] (Transfers) contact guard   Assistive Device (Sit-Stand Transfers) walker, front-wheeled   Balance   Balance Comments mild deficits present. first time walking   Activities of Daily Living   BADL Assessment/Intervention bathing;lower body dressing   Bathing Assessment/Intervention   [place] Level (Bathing) minimum assist (75% patient effort)   Comment, (Bathing) seated   Lower Body Dressing Assessment/Training   [place] Level (Lower Body Dressing) minimum assist (75% patient effort)   Clinical Impression   Criteria for Skilled Therapeutic Interventions Met (OT) Yes, treatment indicated   OT Diagnosis decreased function in ADL   OT Problem List-Impairments impacting ADL problems related to;activity tolerance impaired;mobility;pain;post-surgical precautions   Assessment of Occupational Performance 3-5 Performance Deficits   Identified Performance Deficits bathing, dressing, toileting, IADL mgmt, health mgmt   Planned Therapy Interventions (OT) ADL retraining;IADL retraining   Clinical Decision Making Complexity (OT) problem focused assessment/low complexity   Risk & Benefits of therapy have been explained evaluation/treatment results reviewed;care plan/treatment goals reviewed;risks/benefits reviewed;current/potential barriers reviewed;participants voiced agreement with care plan;participants included;patient   Clinical Impression Comments decreased function in ADL warrants skilled therapy   OT Total Evaluation Time   OT Eval, Low Complexity Minutes (08685) 15   OT Goals   Therapy Frequency (OT) 2 times/day   OT Predicted Duration/Target Date for Goal Attainment 07/31/25   OT Goals Lower Body Dressing;Transfers;Cardiac Phase 1   OT: Lower Body  Dressing Modified independent;within precautions   OT: Transfer Modified independent;with assistive device  (seat, shower)   OT: Understanding of cardiac education to maximize quality of life, condition management, and health outcomes Patient;Verbalize   OT: Perform aerobic activity with stable cardiovascular response continuous;10 minutes;ambulation   OT: Functional/aerobic ambulation tolerance with stable cardiovascular response in order to return to home and community environment Independent;Greater than 300 feet   OT: Navigation of stairs simulating home set up with stable cardiovascular response in order to return to home and community environment Independent;5 stairs   Interventions   Interventions Quick Adds Cardiac Rehab;Self-Care/Home Management;Therapeutic Activity;Therapeutic Procedures/Exercise   Therapeutic Activities   Therapeutic Activity Minutes (37564) 30   Symptoms noted during/after treatment none   Treatment Detail/Skilled Intervention seen for [facility] session today. significant cueing during sesion to keep off R wrist. rolling supine to sit EOB, cues for hand placement to get into standing with FWW, min A, gBW. cals completed, see below. amb 30 ft in room then to recliner. stood at recliner for 1 minutes with cues for PLB. sat in chair, initiated education. all handout provided. packet in room. see below. alarm on in chair   Calisthenics   Type Hip Abductors;Hip Flexor: kicks;March in place   Duration (minutes) 6 minutes   Effort Scale 3   Symptoms Dyspnea   Cardiovascular Response Normal   Exercise Details standing cals with FWW for balance. cues for NWB in wrist. rest breaks as needed. cues for PLB   Vital Signs Details normal, see chart   Cardiac Education   Education Provided Emergency plan;Outpatient Cardiac Rehab;Precautions;Resuming home activities;Risk factors;Signs and symptoms   Education Packet Given to Patient Yes   All Patient Education Handouts Reviewed with Patient and/or Family No    Cardiac Rehab Phase II Plan   Phase II Order Received Yes   Phase II Appointment Status Not scheduled  (as of 7/27. will go to [place] location)   OT Discharge Planning   OT Plan review ed, progress amb time   OT Discharge Recommendation (DC Rec) home with outpatient cardiac rehab   OT Rationale for DC Rec anticipate with skilled therapy pt will be able to discharge to home with assist for IADL and OP CR for monitored activity and lifestyle education s/p STEMI with PCI. order for  OP CR is in   OT Brief overview of current status Goals of therapy will be to address safe mobility and make recs for d/c to next level of care. Pt and RN will continue to follow all falls risk precautions as documented by RN staff while hospitalized. Ax1 GBW   OT Total Distance Amb During Session (feet) 30   OT Equipment Needed at Discharge shower chair   Total Session Time   Timed Code Treatment Minutes 30   Total Session Time (sum of timed and untimed services) 45

## 2025-07-27 NOTE — PLAN OF CARE
Neuro: Alert and oriented x 4  Vital signs: VSS  Respiratory: 2 L NC for comfort  Pain:Denies CP  Tele:SB/SR  Mobility:Assists of 2, stood up for weight this AM, weak  Drips/Drains/IVF:Saline locked  Skin:WNL  Diet:Cardiac  GI/:Straight cath for 800 ml, external catheter. Urine jeanette after straight cath  Aggression color:Green  Plan/Test/Consult:  Labs:Trop trending up, no CP  Misc:  angio site on the R radial, leaked overnight, C/D/I, CMS intact. Bed alarm on, call light within reach.

## 2025-07-27 NOTE — PROGRESS NOTES
Admission/Transfer from: ED  2 RN skin assessment completed. Yes  Significant findings include: Birthmark on the Left upper back  WOC Nurse Consult Ordered? No

## 2025-07-27 NOTE — PROVIDER NOTIFICATION
MD Notification    Notified Person: MD    Notified Person Name:Dr. Gomez    Notification Date/Time:7/27/25 0544    Notification Interaction:lisa    Purpose of Notification:You would kindly order heparin flushes, pt has a PORT.    Orders Received:    Comments:

## 2025-07-27 NOTE — CONSULTS
NUTRITION EDUCATION      REASON FOR ASSESSMENT:  Provider order - Nutrition education - Heart Healthy Education by Dr Hatfield    NUTRITION HISTORY:  Patient received nutrition education regarding Heart Healthy diet in 2017. Has been attempting to follow a low sodium diet since then.     CURRENT DIET:  Orders Placed This Encounter      Combination Diet Low Saturated Fat Na <2400mg Diet      INTERVENTIONS:    Nutrition Prescription:  Encouraged good intakes of protein, low sodium/fat, and high fiber.     Implementation:      *  Nutrition Education (Content):   A)  Provided handout Heart Healthy Nutrition Therapy   B)  Discussed portion sizes, nutrition labels, fiber/fats/sodium      *  Nutrition Education (Application):   A)  Discussed current eating habits and recommended alternative food choices      *  Anticipate good compliance      *  Diet Education - refer to Education Flowsheet    Goals:      *  Patient will verbalize understanding of diet.      *  All of the above goals met during the education session    Follow Up/Monitoring:      *  Provided RD contact information for future questions      *  Recommended Out-Patient Nutrition Referral, if further diet instructions are needed    Garfield Kimball, RD, LD, MS  [facility]  Available on [employer]

## 2025-07-27 NOTE — PHARMACY-ADMISSION MEDICATION HISTORY
Pharmacist Admission Medication History    Admission medication history is complete. The information provided in this note is only as accurate as the sources available at the time of the update.    Information Source(s): Patient and CareEverywhere/SureScripts via in-person    Pertinent Information:   1) Patient was on the following medications but stopped them around the end of May with esophageal cancer diagnosis:  Metoprolol ER 25mg daily, losartan 25mg daily, atorvastatin 40mg daily,     2) Patient allergic to aspirin and had needed desensitization in order to tolerate 81mg aspirin daily. Patient states that he hasn't taken aspirin in about a month. (Chart documents 5 weeks)    3) Patient last received outpatient chemotherapy 7/24 for esophageal cancer --> including oxaliplatin, fluorouracil [facility]    Changes made to PTA medication list:  Added: all medications newly added to patient's medication list  Deleted: None  Changed: None    Allergies reviewed with patient and updates made in EHR: reviewed narcotic allergies (patient states he is unable to remember all allergies)    Medication History Completed By: Kim Cueva, PharmD 7/26/2025 9:23 PM    PTA Med List   Medication Sig Last Dose/Taking    acetaminophen (TYLENOL) 325 MG tablet Take 325-650 mg by mouth every 6 hours as needed for mild pain. 7/26/2025 Morning    apixaban ANTICOAGULANT (ELIQUIS) 5 MG tablet Take 5 mg by mouth 2 times daily. 7/26/2025 Morning    dexAMETHasone (DECADRON) 4 MG tablet Take 8 mg by mouth daily. On days 2 & 3 of cycle. Patient last received chemo 7/24 7/26/2025 Morning    lidocaine-prilocaine (EMLA) 2.5-2.5 % external cream Apply topically daily as needed for moderate pain. Port site Taking As Needed    ondansetron (ZOFRAN) 8 MG tablet Take 8 mg by mouth every 8 hours as needed for nausea. Taking As Needed    prochlorperazine (COMPAZINE) 5 MG tablet Take 5 mg by mouth every 6 hours as needed  for nausea or vomiting. Taking As Needed

## 2025-07-28 ENCOUNTER — APPOINTMENT (OUTPATIENT)
Dept: OCCUPATIONAL THERAPY | Facility: CLINIC | Age: 80
DRG: 323 | End: 2025-07-28
Payer: COMMERCIAL

## 2025-07-28 ENCOUNTER — APPOINTMENT (OUTPATIENT)
Dept: CT IMAGING | Facility: CLINIC | Age: 80
DRG: 323 | End: 2025-07-28
Attending: NURSE PRACTITIONER
Payer: COMMERCIAL

## 2025-07-28 ENCOUNTER — APPOINTMENT (OUTPATIENT)
Dept: GENERAL RADIOLOGY | Facility: CLINIC | Age: 80
DRG: 323 | End: 2025-07-28
Attending: INTERNAL MEDICINE
Payer: COMMERCIAL

## 2025-07-28 LAB
ABO + RH BLD: NORMAL
ACT BLD: 235 SECONDS (ref 74–150)
ANION GAP SERPL CALCULATED.3IONS-SCNC: 11 MMOL/L (ref 7–15)
ANION GAP SERPL CALCULATED.3IONS-SCNC: 12 MMOL/L (ref 7–15)
ANION GAP SERPL CALCULATED.3IONS-SCNC: 12 MMOL/L (ref 7–15)
ATRIAL RATE - MUSE: 89 BPM
BLD GP AB SCN SERPL QL: NEGATIVE
BLD PROD TYP BPU: NORMAL
BLD PROD TYP BPU: NORMAL
BLOOD COMPONENT TYPE: NORMAL
BLOOD COMPONENT TYPE: NORMAL
BUN SERPL-MCNC: 50.8 MG/DL (ref 8–23)
BUN SERPL-MCNC: 52.8 MG/DL (ref 8–23)
BUN SERPL-MCNC: 58.4 MG/DL (ref 8–23)
CALCIUM SERPL-MCNC: 8.7 MG/DL (ref 8.8–10.4)
CALCIUM SERPL-MCNC: 8.8 MG/DL (ref 8.8–10.4)
CALCIUM SERPL-MCNC: 8.9 MG/DL (ref 8.8–10.4)
CHLORIDE SERPL-SCNC: 102 MMOL/L (ref 98–107)
CHLORIDE SERPL-SCNC: 103 MMOL/L (ref 98–107)
CHLORIDE SERPL-SCNC: 105 MMOL/L (ref 98–107)
CODING SYSTEM: NORMAL
CODING SYSTEM: NORMAL
CREAT SERPL-MCNC: 0.83 MG/DL (ref 0.67–1.17)
CREAT SERPL-MCNC: 0.83 MG/DL (ref 0.67–1.17)
CREAT SERPL-MCNC: 0.87 MG/DL (ref 0.67–1.17)
CROSSMATCH: NORMAL
DIASTOLIC BLOOD PRESSURE - MUSE: NORMAL MMHG
EGFRCR SERPLBLD CKD-EPI 2021: 87 ML/MIN/1.73M2
EGFRCR SERPLBLD CKD-EPI 2021: 88 ML/MIN/1.73M2
EGFRCR SERPLBLD CKD-EPI 2021: 88 ML/MIN/1.73M2
ERYTHROCYTE [DISTWIDTH] IN BLOOD BY AUTOMATED COUNT: 15.4 % (ref 10–15)
GLUCOSE BLDC GLUCOMTR-MCNC: 122 MG/DL (ref 70–99)
GLUCOSE SERPL-MCNC: 111 MG/DL (ref 70–99)
GLUCOSE SERPL-MCNC: 118 MG/DL (ref 70–99)
GLUCOSE SERPL-MCNC: 144 MG/DL (ref 70–99)
HCO3 SERPL-SCNC: 20 MMOL/L (ref 22–29)
HCO3 SERPL-SCNC: 21 MMOL/L (ref 22–29)
HCO3 SERPL-SCNC: 22 MMOL/L (ref 22–29)
HCT VFR BLD AUTO: 23.8 % (ref 40–53)
HGB BLD-MCNC: 7.6 G/DL (ref 13.3–17.7)
HGB BLD-MCNC: 7.8 G/DL (ref 13.3–17.7)
HGB BLD-MCNC: 8.7 G/DL (ref 13.3–17.7)
HGB BLD-MCNC: 9.1 G/DL (ref 13.3–17.7)
HGB BLD-MCNC: 9.1 G/DL (ref 13.3–17.7)
INR PPP: 1.18 (ref 0.85–1.15)
INTERPRETATION ECG - MUSE: NORMAL
ISSUE DATE AND TIME: NORMAL
ISSUE DATE AND TIME: NORMAL
LACTATE SERPL-SCNC: 1.5 MMOL/L (ref 0.7–2)
LACTATE SERPL-SCNC: 2.2 MMOL/L (ref 0.7–2)
LACTATE SERPL-SCNC: 3.3 MMOL/L (ref 0.7–2)
MAGNESIUM SERPL-MCNC: 1.9 MG/DL (ref 1.7–2.3)
MAGNESIUM SERPL-MCNC: 2.5 MG/DL (ref 1.7–2.3)
MCH RBC QN AUTO: 27.4 PG (ref 26.5–33)
MCHC RBC AUTO-ENTMCNC: 31.9 G/DL (ref 31.5–36.5)
MCV RBC AUTO: 81 FL (ref 78–100)
MCV RBC AUTO: 82 FL (ref 78–100)
MCV RBC AUTO: 86 FL (ref 78–100)
NT-PROBNP SERPL-MCNC: 1927 PG/ML (ref 0–852)
P AXIS - MUSE: 48 DEGREES
PHOSPHATE SERPL-MCNC: 2.5 MG/DL (ref 2.5–4.5)
PLATELET # BLD AUTO: 364 10E3/UL (ref 150–450)
POTASSIUM SERPL-SCNC: 4.1 MMOL/L (ref 3.4–5.3)
POTASSIUM SERPL-SCNC: 4.1 MMOL/L (ref 3.4–5.3)
POTASSIUM SERPL-SCNC: 4.4 MMOL/L (ref 3.4–5.3)
PR INTERVAL - MUSE: 178 MS
PROTHROMBIN TIME: 14.8 SECONDS (ref 11.8–14.8)
QRS DURATION - MUSE: 96 MS
QT - MUSE: 456 MS
QTC - MUSE: 554 MS
R AXIS - MUSE: -14 DEGREES
RBC # BLD AUTO: 2.77 10E6/UL (ref 4.4–5.9)
SODIUM SERPL-SCNC: 136 MMOL/L (ref 135–145)
SPECIMEN EXP DATE BLD: NORMAL
SYSTOLIC BLOOD PRESSURE - MUSE: NORMAL MMHG
T AXIS - MUSE: 98 DEGREES
TROPONIN T SERPL HS-MCNC: 112 NG/L
TROPONIN T SERPL HS-MCNC: 116 NG/L
TROPONIN T SERPL HS-MCNC: 121 NG/L
UNIT ABO/RH: NORMAL
UNIT ABO/RH: NORMAL
UNIT NUMBER: NORMAL
UNIT NUMBER: NORMAL
UNIT STATUS: NORMAL
UNIT STATUS: NORMAL
UNIT TYPE ISBT: 5100
UNIT TYPE ISBT: 5100
UPPER GI ENDOSCOPY: NORMAL
VENTRICULAR RATE- MUSE: 89 BPM
WBC # BLD AUTO: 14.5 10E3/UL (ref 4–11)

## 2025-07-28 PROCEDURE — 74174 CTA ABD&PLVS W/CONTRAST: CPT

## 2025-07-28 PROCEDURE — 83605 ASSAY OF LACTIC ACID: CPT | Performed by: NURSE PRACTITIONER

## 2025-07-28 PROCEDURE — 43235 EGD DIAGNOSTIC BRUSH WASH: CPT | Performed by: INTERNAL MEDICINE

## 2025-07-28 PROCEDURE — 86923 COMPATIBILITY TEST ELECTRIC: CPT | Performed by: INTERNAL MEDICINE

## 2025-07-28 PROCEDURE — 250N000011 HC RX IP 250 OP 636: Performed by: INTERNAL MEDICINE

## 2025-07-28 PROCEDURE — 85018 HEMOGLOBIN: CPT

## 2025-07-28 PROCEDURE — 86850 RBC ANTIBODY SCREEN: CPT

## 2025-07-28 PROCEDURE — 85610 PROTHROMBIN TIME: CPT | Performed by: INTERNAL MEDICINE

## 2025-07-28 PROCEDURE — 250N000009 HC RX 250: Performed by: INTERNAL MEDICINE

## 2025-07-28 PROCEDURE — 99207 PR NON-BILLABLE SERV PER CHARTING: CPT | Performed by: NURSE PRACTITIONER

## 2025-07-28 PROCEDURE — P9016 RBC LEUKOCYTES REDUCED: HCPCS

## 2025-07-28 PROCEDURE — 93005 ELECTROCARDIOGRAM TRACING: CPT

## 2025-07-28 PROCEDURE — 99291 CRITICAL CARE FIRST HOUR: CPT | Mod: FS | Performed by: NURSE PRACTITIONER

## 2025-07-28 PROCEDURE — 97535 SELF CARE MNGMENT TRAINING: CPT | Mod: GO

## 2025-07-28 PROCEDURE — 210N000001 HC R&B IMCU HEART CARE

## 2025-07-28 PROCEDURE — 250N000009 HC RX 250: Performed by: NURSE PRACTITIONER

## 2025-07-28 PROCEDURE — 85018 HEMOGLOBIN: CPT | Performed by: NURSE PRACTITIONER

## 2025-07-28 PROCEDURE — 3E043XZ INTRODUCTION OF VASOPRESSOR INTO CENTRAL VEIN, PERCUTANEOUS APPROACH: ICD-10-PCS

## 2025-07-28 PROCEDURE — 250N000011 HC RX IP 250 OP 636: Performed by: NURSE PRACTITIONER

## 2025-07-28 PROCEDURE — 86923 COMPATIBILITY TEST ELECTRIC: CPT

## 2025-07-28 PROCEDURE — 99291 CRITICAL CARE FIRST HOUR: CPT

## 2025-07-28 PROCEDURE — 71045 X-RAY EXAM CHEST 1 VIEW: CPT

## 2025-07-28 PROCEDURE — 84484 ASSAY OF TROPONIN QUANT: CPT

## 2025-07-28 PROCEDURE — 84100 ASSAY OF PHOSPHORUS: CPT | Performed by: NURSE PRACTITIONER

## 2025-07-28 PROCEDURE — 250N000013 HC RX MED GY IP 250 OP 250 PS 637: Performed by: NURSE PRACTITIONER

## 2025-07-28 PROCEDURE — 80048 BASIC METABOLIC PNL TOTAL CA: CPT | Performed by: INTERNAL MEDICINE

## 2025-07-28 PROCEDURE — 36415 COLL VENOUS BLD VENIPUNCTURE: CPT

## 2025-07-28 PROCEDURE — 83605 ASSAY OF LACTIC ACID: CPT

## 2025-07-28 PROCEDURE — 250N000011 HC RX IP 250 OP 636: Mod: JZ

## 2025-07-28 PROCEDURE — 82947 ASSAY GLUCOSE BLOOD QUANT: CPT | Performed by: INTERNAL MEDICINE

## 2025-07-28 PROCEDURE — 80048 BASIC METABOLIC PNL TOTAL CA: CPT

## 2025-07-28 PROCEDURE — 83735 ASSAY OF MAGNESIUM: CPT

## 2025-07-28 PROCEDURE — 99292 CRITICAL CARE ADDL 30 MIN: CPT | Mod: FS | Performed by: NURSE PRACTITIONER

## 2025-07-28 PROCEDURE — 83880 ASSAY OF NATRIURETIC PEPTIDE: CPT

## 2025-07-28 PROCEDURE — 30283B1 TRANSFUSION OF NONAUTOLOGOUS 4-FACTOR PROTHROMBIN COMPLEX CONCENTRATE INTO VEIN, PERCUTANEOUS APPROACH: ICD-10-PCS

## 2025-07-28 PROCEDURE — 258N000003 HC RX IP 258 OP 636: Performed by: NURSE PRACTITIONER

## 2025-07-28 PROCEDURE — 83735 ASSAY OF MAGNESIUM: CPT | Performed by: STUDENT IN AN ORGANIZED HEALTH CARE EDUCATION/TRAINING PROGRAM

## 2025-07-28 PROCEDURE — 99291 CRITICAL CARE FIRST HOUR: CPT | Mod: 25 | Performed by: INTERNAL MEDICINE

## 2025-07-28 PROCEDURE — 85027 COMPLETE CBC AUTOMATED: CPT

## 2025-07-28 PROCEDURE — 86900 BLOOD TYPING SEROLOGIC ABO: CPT

## 2025-07-28 PROCEDURE — 258N000003 HC RX IP 258 OP 636

## 2025-07-28 PROCEDURE — 99232 SBSQ HOSP IP/OBS MODERATE 35: CPT | Performed by: STUDENT IN AN ORGANIZED HEALTH CARE EDUCATION/TRAINING PROGRAM

## 2025-07-28 PROCEDURE — 0DJ08ZZ INSPECTION OF UPPER INTESTINAL TRACT, VIA NATURAL OR ARTIFICIAL OPENING ENDOSCOPIC: ICD-10-PCS | Performed by: INTERNAL MEDICINE

## 2025-07-28 RX ORDER — TICAGRELOR 90 MG/1
90 TABLET, FILM COATED ORAL EVERY 12 HOURS
Status: DISCONTINUED | OUTPATIENT
Start: 2025-07-29 | End: 2025-07-28

## 2025-07-28 RX ORDER — NALOXONE HYDROCHLORIDE 0.4 MG/ML
0.2 INJECTION, SOLUTION INTRAMUSCULAR; INTRAVENOUS; SUBCUTANEOUS
Status: DISCONTINUED | OUTPATIENT
Start: 2025-07-28 | End: 2025-07-28

## 2025-07-28 RX ORDER — NALOXONE HYDROCHLORIDE 0.4 MG/ML
0.4 INJECTION, SOLUTION INTRAMUSCULAR; INTRAVENOUS; SUBCUTANEOUS
Status: DISCONTINUED | OUTPATIENT
Start: 2025-07-28 | End: 2025-07-28

## 2025-07-28 RX ORDER — ROPIVACAINE IN 0.9% SOD CHL/PF 0.1 %
.01-.2 PLASTIC BAG, INJECTION (ML) EPIDURAL CONTINUOUS
Status: DISCONTINUED | OUTPATIENT
Start: 2025-07-28 | End: 2025-07-28

## 2025-07-28 RX ORDER — FENTANYL CITRATE 50 UG/ML
25-100 INJECTION, SOLUTION INTRAMUSCULAR; INTRAVENOUS EVERY 5 MIN PRN
Refills: 0 | Status: DISCONTINUED | OUTPATIENT
Start: 2025-07-28 | End: 2025-07-28

## 2025-07-28 RX ORDER — DIPHENHYDRAMINE HYDROCHLORIDE 50 MG/ML
25-50 INJECTION, SOLUTION INTRAMUSCULAR; INTRAVENOUS
Status: DISCONTINUED | OUTPATIENT
Start: 2025-07-28 | End: 2025-07-28

## 2025-07-28 RX ORDER — FLUMAZENIL 0.1 MG/ML
0.2 INJECTION, SOLUTION INTRAVENOUS
Status: DISCONTINUED | OUTPATIENT
Start: 2025-07-28 | End: 2025-07-28

## 2025-07-28 RX ORDER — EPINEPHRINE 1 MG/ML
0.1 INJECTION, SOLUTION, CONCENTRATE INTRAVENOUS
Status: DISCONTINUED | OUTPATIENT
Start: 2025-07-28 | End: 2025-07-28

## 2025-07-28 RX ORDER — ATROPINE SULFATE 0.1 MG/ML
1 INJECTION INTRAVENOUS
Status: DISCONTINUED | OUTPATIENT
Start: 2025-07-28 | End: 2025-07-28

## 2025-07-28 RX ORDER — IOPAMIDOL 755 MG/ML
119 INJECTION, SOLUTION INTRAVASCULAR ONCE
Status: COMPLETED | OUTPATIENT
Start: 2025-07-28 | End: 2025-07-28

## 2025-07-28 RX ORDER — TICAGRELOR 90 MG/1
90 TABLET, FILM COATED ORAL EVERY 12 HOURS
Status: DISPENSED | OUTPATIENT
Start: 2025-07-28

## 2025-07-28 RX ORDER — LIDOCAINE 40 MG/G
CREAM TOPICAL
Status: ACTIVE | OUTPATIENT
Start: 2025-07-28

## 2025-07-28 RX ORDER — NOREPINEPHRINE BITARTRATE 0.02 MG/ML
INJECTION, SOLUTION INTRAVENOUS
Status: COMPLETED
Start: 2025-07-28 | End: 2025-07-28

## 2025-07-28 RX ORDER — FENTANYL CITRATE 50 UG/ML
25-50 INJECTION, SOLUTION INTRAMUSCULAR; INTRAVENOUS
Status: DISCONTINUED | OUTPATIENT
Start: 2025-07-28 | End: 2025-07-28

## 2025-07-28 RX ORDER — FENTANYL CITRATE 50 UG/ML
INJECTION, SOLUTION INTRAMUSCULAR; INTRAVENOUS
Status: COMPLETED
Start: 2025-07-28 | End: 2025-07-28

## 2025-07-28 RX ORDER — MAGNESIUM SULFATE HEPTAHYDRATE 40 MG/ML
2 INJECTION, SOLUTION INTRAVENOUS ONCE
Status: COMPLETED | OUTPATIENT
Start: 2025-07-28 | End: 2025-07-28

## 2025-07-28 RX ORDER — SIMETHICONE 40MG/0.6ML
133 SUSPENSION, DROPS(FINAL DOSAGE FORM)(ML) ORAL
Status: ACTIVE | OUTPATIENT
Start: 2025-07-28

## 2025-07-28 RX ORDER — LIDOCAINE 40 MG/G
CREAM TOPICAL
Status: CANCELLED | OUTPATIENT
Start: 2025-07-28

## 2025-07-28 RX ORDER — SALIVA STIMULANT COMB. NO.3
2 SPRAY, NON-AEROSOL (ML) MUCOUS MEMBRANE
Status: ACTIVE | OUTPATIENT
Start: 2025-07-28

## 2025-07-28 RX ORDER — HYDROMORPHONE HYDROCHLORIDE 1 MG/ML
INJECTION, SOLUTION INTRAMUSCULAR; INTRAVENOUS; SUBCUTANEOUS
Status: COMPLETED
Start: 2025-07-28 | End: 2025-07-28

## 2025-07-28 RX ORDER — FENTANYL CITRATE 50 UG/ML
50 INJECTION, SOLUTION INTRAMUSCULAR; INTRAVENOUS ONCE
Status: COMPLETED | OUTPATIENT
Start: 2025-07-28 | End: 2025-07-28

## 2025-07-28 RX ORDER — HYDROMORPHONE HYDROCHLORIDE 1 MG/ML
0.5 INJECTION, SOLUTION INTRAMUSCULAR; INTRAVENOUS; SUBCUTANEOUS
Status: DISCONTINUED | OUTPATIENT
Start: 2025-07-28 | End: 2025-07-28

## 2025-07-28 RX ADMIN — SODIUM PHOSPHATE, MONOBASIC, MONOHYDRATE AND SODIUM PHOSPHATE, DIBASIC, ANHYDROUS 9 MMOL: 142; 276 INJECTION, SOLUTION INTRAVENOUS at 14:21

## 2025-07-28 RX ADMIN — MAGNESIUM SULFATE HEPTAHYDRATE 2 G: 40 INJECTION, SOLUTION INTRAVENOUS at 12:37

## 2025-07-28 RX ADMIN — Medication 0.03 MCG/KG/MIN: at 03:02

## 2025-07-28 RX ADMIN — PROTHROMBIN, COAGULATION FACTOR VII HUMAN, COAGULATION FACTOR IX HUMAN, COAGULATION FACTOR X HUMAN, PROTEIN C, PROTEIN S HUMAN, AND WATER 4500 UNITS: KIT at 04:22

## 2025-07-28 RX ADMIN — SODIUM CHLORIDE 80 ML: 9 INJECTION, SOLUTION INTRAVENOUS at 10:53

## 2025-07-28 RX ADMIN — IOPAMIDOL 119 ML: 755 INJECTION, SOLUTION INTRAVENOUS at 10:53

## 2025-07-28 RX ADMIN — NOREPINEPHRINE BITARTRATE 0.03 MCG/KG/MIN: 0.02 INJECTION, SOLUTION INTRAVENOUS at 03:02

## 2025-07-28 RX ADMIN — SODIUM CHLORIDE, SODIUM LACTATE, POTASSIUM CHLORIDE, AND CALCIUM CHLORIDE 500 ML: .6; .31; .03; .02 INJECTION, SOLUTION INTRAVENOUS at 01:29

## 2025-07-28 RX ADMIN — SODIUM CHLORIDE, SODIUM LACTATE, POTASSIUM CHLORIDE, AND CALCIUM CHLORIDE 500 ML: .6; .31; .03; .02 INJECTION, SOLUTION INTRAVENOUS at 14:21

## 2025-07-28 RX ADMIN — FENTANYL CITRATE 50 MCG: 50 INJECTION, SOLUTION INTRAMUSCULAR; INTRAVENOUS at 03:01

## 2025-07-28 RX ADMIN — PANTOPRAZOLE SODIUM 40 MG: 40 INJECTION, POWDER, FOR SOLUTION INTRAVENOUS at 15:52

## 2025-07-28 RX ADMIN — Medication 5 ML: at 01:21

## 2025-07-28 RX ADMIN — FENTANYL CITRATE 50 MCG: 50 INJECTION, SOLUTION INTRAMUSCULAR; INTRAVENOUS at 03:42

## 2025-07-28 RX ADMIN — ATORVASTATIN CALCIUM 80 MG: 80 TABLET, FILM COATED ORAL at 20:01

## 2025-07-28 RX ADMIN — ONDANSETRON 4 MG: 2 INJECTION, SOLUTION INTRAMUSCULAR; INTRAVENOUS at 01:41

## 2025-07-28 RX ADMIN — TICAGRELOR 90 MG: 90 TABLET, FILM COATED ORAL at 20:00

## 2025-07-28 RX ADMIN — SODIUM CHLORIDE, SODIUM LACTATE, POTASSIUM CHLORIDE, AND CALCIUM CHLORIDE 500 ML: .6; .31; .03; .02 INJECTION, SOLUTION INTRAVENOUS at 00:26

## 2025-07-28 RX ADMIN — Medication 5 ML: at 21:50

## 2025-07-28 RX ADMIN — SODIUM CHLORIDE, SODIUM LACTATE, POTASSIUM CHLORIDE, AND CALCIUM CHLORIDE 500 ML: .6; .31; .03; .02 INJECTION, SOLUTION INTRAVENOUS at 11:19

## 2025-07-28 RX ADMIN — SODIUM CHLORIDE, SODIUM LACTATE, POTASSIUM CHLORIDE, AND CALCIUM CHLORIDE 500 ML: .6; .31; .03; .02 INJECTION, SOLUTION INTRAVENOUS at 03:10

## 2025-07-28 RX ADMIN — MIDAZOLAM 1 MG: 1 INJECTION INTRAMUSCULAR; INTRAVENOUS at 03:42

## 2025-07-28 RX ADMIN — PANTOPRAZOLE SODIUM 40 MG: 40 INJECTION, POWDER, FOR SOLUTION INTRAVENOUS at 04:30

## 2025-07-28 ASSESSMENT — ACTIVITIES OF DAILY LIVING (ADL)
ADLS_ACUITY_SCORE: 46
ADLS_ACUITY_SCORE: 54
ADLS_ACUITY_SCORE: 42
ADLS_ACUITY_SCORE: 41
ADLS_ACUITY_SCORE: 42
ADLS_ACUITY_SCORE: 41
ADLS_ACUITY_SCORE: 67
ADLS_ACUITY_SCORE: 54
ADLS_ACUITY_SCORE: 41
ADLS_ACUITY_SCORE: 46
ADLS_ACUITY_SCORE: 42
ADLS_ACUITY_SCORE: 64
ADLS_ACUITY_SCORE: 46
ADLS_ACUITY_SCORE: 41
ADLS_ACUITY_SCORE: 54
ADLS_ACUITY_SCORE: 41

## 2025-07-28 NOTE — PROGRESS NOTES
[facility]  [facility]  Gastroenterology Progress note    Interval History:      Hemoglobin stable.      Vital Signs:      /67   Pulse 74   Temp 97.9  F (36.6  C) (Oral)   Resp 16   Wt 88.5 kg (195 lb 1.7 oz)   SpO2 98%   BMI 29.23 kg/m    Temp (24hrs), Av.2  F (36.8  C), Min:97.6  F (36.4  C), Max:98.9  F (37.2  C)    Patient Vitals for the past 72 hrs:   Weight   25 0600 88.5 kg (195 lb 1.7 oz)   25 0642 87.8 kg (193 lb 8 oz)   25 1812 89.8 kg (198 lb)       Intake/Output Summary (Last 24 hours) at 2025 1158  Last data filed at 2025 0800  Gross per 24 hour   Intake 1808.89 ml   Output 650 ml   Net 1158.89 ml           Additional Comments:  ROS, FH, SH: See initial GI consult for details.    Laboratory Data:  Recent Labs   Lab Test 25  1015 25  0605 25  0125 25  1805   WBC  --   --  14.5* 9.8   HGB 9.1* 9.1* 7.6* 11.1*   MCV 81 82 86 85   PLT  --   --  364 312   INR  --  1.18*  --  1.21*     Recent Labs   Lab Test 25  0605 25  0125 25  0044    136 136   POTASSIUM 4.1 4.4 4.1   CHLORIDE 105 102 103   CO2 20* 22 21*   BUN 58.4* 52.8* 50.8*   CR 0.87 0.83 0.83   ANIONGAP 11 12 12   GAGE 8.9 8.7* 8.8     No lab results found.      Assessment:  79 yo male with hematemesis in the setting of dual antiplatelet therapy and esophageal cancer.    EGD  with pre-existing esophageal stent, clotted blood in the lower third of the esophagus, clotted blood in the cardia and gastric fundus.  Suspect bleeding from esophageal cancer.  Unable to visualize due to clotted blood.  No active bleeding seen.  Eliquis being reversed by [employer], Brilinta on hold.    Hemoglobin stable at 9.1.  Plan:  -  No further role of endoscopic intervention.  -  Agree with IR consult if recurrent signs of bleeding.  -  Agree with holding/reversing anticoagulants.  -  Monitor H/H; transfuse prn.  -  No further GI recommendations at  this time.  Will sign off.  Please call with questions.    Total Time Spent: 10 minutes    DANIELA Mcarthur  [facility]  Office:  [phone] call if needed after 5PM  Cell:  [phone], not available after 5PM at this number

## 2025-07-28 NOTE — CONSULTS
Gastroenterology Consultation    Patient: Aydin Blanc   1945  Date of Consult:  7/28/2025  Admission Date/Time: 7/26/2025  5:53 PM  Primary Care Provider:  Christopher Joshi was asked to see this patient in consultation by Dr. Hatfield for evaluation of hematemesis.    HPI:  Aydin Blanc is a 80 year old male who was diagnosed with esophageal cancer in may, underwent esophageal stent in June, and is being treated with chemotherapy.  He was admitted for a STEMI, and underwent cardiac stenting on 7/26.  Early this morning he became hypotensive and had 2 episodes of hematemesis.    PAST MEDICAL HISTORY:  Past Medical History:   Diagnosis Date    Conductive hearing loss     Hearing problem     Tinnitus        PAST SURGICAL HISTORY:  Past Surgical History:   Procedure Laterality Date    ENT SURGERY         MEDICATIONS:   :   Prior to Admission Medications   Prescriptions Last Dose Informant Patient Reported? Taking?   acetaminophen (TYLENOL) 325 MG tablet 7/26/2025 Morning  Yes Yes   Sig: Take 325-650 mg by mouth every 6 hours as needed for mild pain.   apixaban ANTICOAGULANT (ELIQUIS) 5 MG tablet 7/26/2025 Morning  Yes Yes   Sig: Take 5 mg by mouth 2 times daily.   dexAMETHasone (DECADRON) 4 MG tablet 7/26/2025 Morning  Yes Yes   Sig: Take 8 mg by mouth daily. On days 2 & 3 of cycle. Patient last received chemo 7/24   lidocaine-prilocaine (EMLA) 2.5-2.5 % external cream   Yes Yes   Sig: Apply topically daily as needed for moderate pain. Port site   ondansetron (ZOFRAN) 8 MG tablet   Yes Yes   Sig: Take 8 mg by mouth every 8 hours as needed for nausea.   prochlorperazine (COMPAZINE) 5 MG tablet   Yes Yes   Sig: Take 5 mg by mouth every 6 hours as needed for nausea or vomiting.      Facility-Administered Medications: None       ALLERGIES:   :   Allergies   Allergen Reactions    Aspirin Swelling     Pt reports throat swelling    Codeine Hives     Throat Swelling    Fish Flavor [Flavoring Agent  (Non-Screening)] Hives    Hydrocodone Hives     Throat Swelling    Morphine Hives     Throat Swelling    Nsaids Hives     Throat Swelling    Oxycodone Hives     Throat Swelling       SOCIAL HISTORY:  Social History     Tobacco Use    Smoking status: Light Smoker     Current packs/day: 0.00     Average packs/day: 25.0 packs/day for 20.0 years (500.0 ttl pk-yrs)     Types: Cigarettes     Start date: 1948     Last attempt to quit: 1968     Years since quittin.9       FAMILY HISTORY:  No first degree relative with colon cancer, gastric cancer, crohn's disease, ulcerative colitis, or liver disease.     EXAM:  General Appearance: Alert, oriented x3, no acute distress.  BP (!) 80/52   Pulse 106   Temp 98.9  F (37.2  C) (Axillary)   Resp 20   Wt 87.8 kg (193 lb 8 oz)   SpO2 92%   BMI 28.99 kg/m    Eye: sclera anicteric.  ENT: oropharynx clear, no thrush.  CV: RRR.  Resp: CTA bilaterally.  GI: Soft, NABS, NT/ND.  Musculoskeletal: no joint swelling, erythema, or warmth.  Neuro: no asterixis.      Labs and imaging reviewed    Recent Labs   Lab Test 25  0125 25  1805   WBC 14.5* 9.8   HGB 7.6* 11.1*   MCV 86 85    312   INR  --  1.21*     Recent Labs   Lab Test 25  0125 25  0044 25  1805   POTASSIUM 4.4 4.1 4.5   CHLORIDE 102 103 99   CO2 22 21* 21*   BUN 52.8* 50.8* 26.4*   ANIONGAP 12 12 16*         ASSESSMENT AND PLAN:  80 year old male with hematemesis in the setting of dual antiplatelets and esophageal cancer.  Most likely he is bleeding from his esophageal cancer, although other sources such as PUD are possible.  Reasonable to perform EGD given the severity of his bleeding, hypotension, and drop in hemoglobin.  However I explained to him that if he is bleeding from his cancer, this is difficult to control long term.    35 minutes of total time was spent providing patient care, including patient evaluation, reviewing documentation/test results, and .           Vladimir Russell MD  Thank you for the opportunity to participate in the care of this patient.   Please feel free to call with any questions or concerns.  [phone].       CC: [facility], Christopher Joshi

## 2025-07-28 NOTE — PROGRESS NOTES
Critical Care Progress Note                      Name: Aydin Blanc MRN#: [id number]   Age: 80 year old YOB: 1945     Hsptl Day# 2  ICU DAY #    MV DAY#             Problem List:   Principal Problem:    Percutaneous transluminal coronary angioplasty status    Upper GIB          Summary/Hospital Course:     Aydin Blanc is a 80 y.o. M with a PMH notable for esophageal cancer (dx in May, on chemo, stent placed in June 2025), CAD with prior MI and stents, PE (on eliquis PTA), and NSVT. Admitted 7/26/25 for acute inferior STEMI and had 2 stents placed to his RCA. Developed UGIB with hematemesis and near syncope evening of 7/27/25 and transferred to ICU in the early hours of 7/28/25 and underwent EGD revealing a clot distal to his esophageal stent, but no active bleeding. Resuscitated with 1 unit PRBCs and 2L crystalloid. Briefly required vasopressors, but has been weaned off.       Assessment and plan :       I have personally reviewed the daily labs, imaging studies, cultures and discussed the case with referring physician and consulting physicians.     My assessment and plan by system for this patient is as follows:    Main plans today:  - Trend Hgb q6h, monitor for melena/hematemesis  - CTA chest/abd/pelvis --> no active bleeding  - Resume brillinta, hold apixaban  - Transfer to hospital medicine this afternoon if pt remains stable    Neurology:   # Acute pain   - Acetaminophen 1 g q6h PRN   - Fentanyl 25-50 mcg q2h PRN       Cardiovascular:   # Multifactorial shock - hemorrhagic & distributive 2/2 sedation improved   # Acute inferior wall STEMI s/p JEROME to RCA on 7/26/25  # Lactic acidosis 2/2 above   # CAD  # NSVT, h/o   # Ascending aortic dilation (4.3 cm)    Developed hematemesis and near syncope while ambulating to the bathroom evening of 7/27/25. Initial Hgb 11.1 --> 7.6 with LA 3.3, trop trend flat, BUN elevated.  Resuscitated with 1 unit PRBCs and 2L crystalloid. Received PCC and  Brillinta and eliquis are held. Briefly required vasopressors, but has been weaned off.  EGD revealed a clot distal to his esophageal stent believed to be bleeding associated with his esophageal cancer. Under going CTA to evaluate for active bleeding and will engage IR if any is identified for consideration of embolization for bleeding source control. Denies chest pain this AM. Endorses dizziness when changing positions. Hgb trend thusfar stable. 1 BM since ICU arrival.     - Cardiology consulted, appreciate recs  Resume brillinta ASAP when safe from a GI standpoint   - Trend Hgb q6h   - Tele and VS per ICU routine, goal MAP > 65 mmHg   - 0.5L LR bolus now   - Norepi off since 0600   - Holding losartan 50 mg every day and metoprolol succinate 25 mg every day   - Atorvastatin 80 mg every day   - Acx/anti-plt discussed with GI  - Brillinta 90 mg BID --> Resume 7/28, monitor for bleeding    - Eliquis 5 mg BID --> Continue to hold    Pulmonary/Ventilator Management:   # Acute hypoxia suspect 2/2 atelectasis   Supplemental O2 needs up to 10L at end of EGD, however now has been weaned to 4L   - Supplemental oxygen to maintain SpO2 > 92%  - Incentive spirometer Q2H while awake  - Encourage pulmonary hygiene    GI/Nutrition:  # Upper GIB suspected 2/2 bleeding esophageal tumor   # Esophageal carcinoma, currently undergoing chemotherapy (last infusion 7/25)  Developed hematemesis evening of 7/27/25. EGD 7/28/25 --> clotted blood in the lower 1/3rd of the esophagus, cardia, and gastric fundus suspected from esophageal cancer. No active bleeding visualized under clotting. Unfortunately, his esophageal cancer is the source of his bleeding, this may be a difficult situation to control.   - GI following, appreciate recs (signed off 7/28, will need to discuss DOAC):  IF recurrent bleeding, consult IR   Discuss acx/anti-plt therapy resumption as above --> Ok to resume brillinta 7/28, continue to hold apixaban   - CTA  chest/abd/pelvis 7/28 --> No active bleeding identified   - PPI IV BID  - Monitor BMs and emesis   - Diet: Ok for diet --> cardiac diet ordered      Renal/fluids/electrolytes:   # AGMA 2/2 lactic acidosis   # Elevated BUN 2/2 upper GIB  - Strict I&O and daily weights   - RN to manage electrolyte orders in place  - daily BMP      Infectious Disease:   # Leukocytosis   - Monitor WBC trend and fever curve  - If temp elevation low threshold to collect pan cultures and start empiric abx, will hold at this time        Endocrine:   # Stress hyperglycemia   - No indication for insulin sliding scale management at this time  - ICU hypoglycemia protocol in place   - Goal blood glucose < 180    Hematology/Oncology:   # Hemorrhagic shock 2/2 UGIB  # Hx of PE on DOAC  # Recent JEROME (7/26/25) requiring antiplt therapy  - Explore source of bleeding with CT and possible IR consult as above  - Address acx and antiplts as above  - Trend Hgb q6h x 24 hrs, extend as indicated   - CBC daily   - Goal Hgb > 7.0      MSK/Skin:  # Deconditioning  - PT/OT consult     Clinically Significant Risk Factors                # Coagulation Defect: INR = 1.18 (Ref range: 0.85 - 1.15) and/or PTT = 30 Seconds (Ref range: 22 - 38 Seconds), will monitor for bleeding                                 ICU Prophylaxis:   1. DVT: None, recent active bleed   2. VAP: Not indicated   3. Stress Ulcer: PPI   4. Restraints: None   5. Wound care - per unit routine   6. Feeding - NPO  7. Family Update: Updated at bedside   8. Disposition - Transfer to medicine later today if pt remains stable             Interim History/Overnight Events:     -Transferred to ICU for hematemesis and hypotension  - 2L crystalloid & 1 unit PRBCs  - EGD showed clot at distal esophagus/cardia, no active bleeding seen  - Briefly on norepi, now weaned off   - Eliquis and brillinta held, cardiology and GI following          Key Medications:     Current Facility-Administered Medications    Medication Dose Route Frequency Provider Last Rate Last Admin    [Held by provider] apixaban ANTICOAGULANT (ELIQUIS) tablet 5 mg  5 mg Oral BID Christopher Shah MD   5 mg at 07/27/25 2034    [Held by provider] atorvastatin (LIPITOR) tablet 80 mg  80 mg Oral QPM Abdelrahman Hodges MD   80 mg at 07/27/25 2034    heparin lock flush 10 unit/mL injection 5-10 mL  5-10 mL Intracatheter Q24H Carrie Gomez MD   5 mL at 07/27/25 0629    heparin lock flush 100 unit/mL injection 5-10 mL  5-10 mL Intracatheter Q28 Days Carrie Gomez MD        [Held by provider] metoprolol succinate ER (TOPROL XL) 24 hr tablet 25 mg  25 mg Oral Daily Abdelrahman Hodges MD   25 mg at 07/27/25 1351    pantoprazole (PROTONIX) injection 40 mg  40 mg Intravenous Q12H Pito Moya MD   40 mg at 07/28/25 0430    sodium chloride (PF) 0.9% PF flush 10 mL  10 mL Intravenous Once Ely Rondon NP        sodium chloride (PF) 0.9% PF flush 10-20 mL  10-20 mL Intracatheter Q28 Days Carrie Gomez MD        sodium chloride (PF) 0.9% PF flush 3 mL  3 mL Intracatheter Q8H [facility] Christopher Shah MD   3 mL at 07/28/25 0444    [Held by provider] ticagrelor (BRILINTA) tablet 90 mg  90 mg Oral Q12H Jamal Hatfield APRN CNP   90 mg at 07/27/25 1227     Current Facility-Administered Medications   Medication Dose Route Frequency Provider Last Rate Last Admin    Continuing beta blocker from home medication list OR beta blocker order already placed during this visit   Does not apply DOES NOT GO TO Fernando Terrazas MD        Continuing statin from home medication list OR statin order already placed during this visit   Does not apply DOES NOT GO TO Fernando Terrazas MD        medication instruction   Does not apply Continuous PRN Christopher Shah MD        norepinephrine (LEVOPHED) 4 mg in  mL PERIPHERAL infusion  0.01-0.2 mcg/kg/min Intravenous Continuous Jamal Hatfield APRN CNP   Stopped at 07/28/25 0649     Percutaneous Coronary Intervention orders placed (this is information for BPA alerting)   Does not apply DOES NOT GO TO Fernando Terrazas MD        reason aspirin not prescribed (intentional)   Other DOES NOT GO TO Fernando Terrazas MD                   Physical Examination:   Temp:  [97.6  F (36.4  C)-98.9  F (37.2  C)] 97.9  F (36.6  C)  Pulse:  [] 115  Resp:  [10-41] 39  BP: ()/() 147/107  FiO2 (%):  [2 %-6 %] 6 %  SpO2:  [83 %-99 %] 94 %    Intake/Output Summary (Last 24 hours) at 7/28/2025 1023  Last data filed at 7/28/2025 0800  Gross per 24 hour   Intake 1808.89 ml   Output 1350 ml   Net 458.89 ml     Wt Readings from Last 4 Encounters:   07/28/25 88.5 kg (195 lb 1.7 oz)   01/31/17 101.6 kg (224 lb)     BP - Mean:  [] 124  FiO2 (%): 6 %, Resp: (!) 39  No lab results found in last 7 days.    GEN: Awake and alert, not in distress  EYES: PERRL, Anicteric sclera.   HEENT:  Normocephalic, atraumatic, trachea midline, Pupils PERRLA  CV: RRR, no gallops, rubs, 1+ systolid murmur  PULM/CHEST: Clear breath sounds bilaterally without rhonchi, crackles or wheeze, symmetric chest rise, dim bases   GI: normal bowel sounds, soft, non-tender, no rebound tenderness or guarding, no masses  : Voids spontaneously   EXTREMITIES: No peripheral edema, moving all extremities, peripheral pulses intact, extremities warm and well perfused   NEURO: Cranial nerves II-XII grossly intact, no motor-sensory deficits noted  SKIN: No rashes, sores or ulcerations  PSYCH:  Affect: appropriate, mentation at baseline, no hallucinations            Data:   All data and imaging reviewed.     ROUTINE ICU LABS (Last four results)  CMP  Recent Labs   Lab 07/28/25  0605 07/28/25  0125 07/28/25  0044 07/28/25  0012 07/26/25  2106 07/26/25  1805    136 136  --   --  136   POTASSIUM 4.1 4.4 4.1  --   --  4.5   CHLORIDE 105 102 103  --   --  99   CO2 20* 22 21*  --   --  21*   ANIONGAP 11 12 12  --   --  16*   GLC  "144* 111* 118* 122*   < > 168*   BUN 58.4* 52.8* 50.8*  --   --  26.4*   CR 0.87 0.83 0.83  --   --  0.77   GFRESTIMATED 87 88 88  --   --  90   GAGE 8.9 8.7* 8.8  --   --  9.9   MAG  --   --  1.9  --   --   --     < > = values in this interval not displayed.     CBC  Recent Labs   Lab 25  0605 25  0125 25  1805   WBC  --  14.5* 9.8   RBC  --  2.77* 4.07*   HGB 9.1* 7.6* 11.1*   HCT  --  23.8* 34.5*   MCV 82 86 85   MCH  --  27.4 27.3   MCHC  --  31.9 32.2   RDW  --  15.4* 15.5*   PLT  --  364 312     INR  Recent Labs   Lab 25  0625  180   INR 1.18* 1.21*     Arterial Blood GasNo lab results found in last 7 days.    All cultures:  No results for input(s): \"CULT\" in the last 168 hours.  Recent Results (from the past 24 hours)   Echocardiogram Complete   Result Value    LVEF  50-55%    Narrative    [id number]  [id number]  [id number]  [id number]^MADIE^CAROLYN^SAL     [facility]  Echocardiography Laboratory  [address] [place], [place] [address]     Name: SRINIVAS WRIGHT  MRN: [id number]  : 1945  Study Date: 2025 12:58 PM  Age: 80 yrs  Gender: Male  Patient Location: [facility]  Reason For Study: Acute Myocardial Infarction  Ordering Physician: CAROLYN RAMACHANDRAN  Performed By: ROS Zhong     BSA: 2.0 m2  Height: 68 in  Weight: 193 lb  HR: 72  BP: 144/72 mmHg  ______________________________________________________________________________  Procedure  Echocardiogram with two-dimensional, color and spectral Doppler. Definity (NDC  #41242-898) given intravenously. Contrast Definity. Technically difficult  study.  ______________________________________________________________________________  Interpretation Summary     Left ventricular systolic function is low normal.  The visual ejection fraction is 50-55%.  Aortic root is measured 4.7cm, Asc Ao is 4.6cm Technically difficult,  suboptimal study. There is no comparison study " available.  ______________________________________________________________________________  Left Ventricle  The left ventricle is normal in size. Left ventricular systolic function is  low normal. The visual ejection fraction is 50-55%. Diastolic Doppler findings  (E/E' ratio and/or other parameters) suggest left ventricular filling  pressures are normal. No regional wall motion abnormalities noted.     Right Ventricle  The right ventricle is grossly normal size. Right ventricular function cannot  be assessed due to poor image quality.     Atria  The left atrium is severely dilated. Right atrium not well visualized. The  atrial septum is aneurysmal.     Mitral Valve  The mitral valve is normal in structure and function. There is trace mitral  regurgitation. There is no mitral valve stenosis.     Tricuspid Valve  The tricuspid valve is not well visualized. Right ventricular systolic  pressure could not be approximated due to inadequate tricuspid regurgitation.     Aortic Valve  The aortic valve is trileaflet. No aortic regurgitation is present. No  hemodynamically significant valvular aortic stenosis.     Pulmonic Valve  The pulmonic valve is not well visualized. Normal pulmonic valve velocity.     Vessels  The aortic Sinus(es) of Valsalva are moderately dilated. Ascending aorta  dilatation is present.     Pericardium  The pericardium is not well visualized.     ______________________________________________________________________________  MMode/2D Measurements & Calculations  IVSd: 1.0 cm  LVIDd: 4.5 cm  LVIDs: 2.9 cm  LVPWd: 1.0 cm  FS: 35.6 %     LV mass(C)d: 153.3 grams  LV mass(C)dI: 76.1 grams/m2  Ao root diam: 4.7 cm  asc Aorta Diam: 4.6 cm  LVOT diam: 2.3 cm  LVOT area: 4.2 cm2  Ao root diam index Ht(cm/m): 2.7  Ao root diam index BSA (cm/m2): 2.3  Asc Ao diam index BSA (cm/m2): 2.3  Asc Ao diam index Ht(cm/m): 2.7  EF Biplane: 42.5 %  LA Volume (BP): 71.0 ml     LA Volume Index (BP): 35.3 ml/m2  RWT:  0.44  TAPSE: 1.5 cm     Doppler Measurements & Calculations  MV E max moises: 39.1 cm/sec  MV A max moises: 62.1 cm/sec  MV E/A: 0.63  MV dec slope: 133.0 cm/sec2  MV dec time: 0.29 sec  PA acc time: 0.07 sec  E/E' av.8  Lateral E/e': 6.3  Medial E/e': 7.3     ______________________________________________________________________________  Report approved by: Danna Moffett MD on 2025 02:46 PM         XR Chest Port 1 View    Narrative    EXAM: XR CHEST PORT 1 VIEW  LOCATION: [facility]  DATE: 2025    INDICATION: acute respiratory failure  COMPARISON: Chest radiograph 2025. CT chest 2025      Impression    IMPRESSION: Slightly low lung volumes. No focal consolidation, pleural effusion or pneumothorax. Similar right chest port. Similar stent near the gastroesophageal junction.                 Billing: This patient is critically ill: Yes. Total critical care time today 45 min. This does not include time spent on procedures or teaching.     Ely Rondon NP on 2025 at 10:23 AM

## 2025-07-28 NOTE — CODE/RAPID RESPONSE
[facility]  House PALLAVI RRT Note  7/28/2025   Time called: 0010  RRT called for: Dizziness, light-headedness when attempting to get out of bed5  Code status: Full Code    Assessment & Plan   Aydin Blanc is a 80 year old male with a history of MI, CAD s/p stent placement, DVT, PE anticoagulated with Eliquis, NSVT, and esophageal adenocarcinoma who presents to the ED via EMS with chest pressure with STEMI in the inferior leads with EMS. Patient was taken to cath lab and noted to have 99% mid RCA disease that is heavily calcified. He underwent PCI with JEROME x2. He has been started on Brillinta and did not receive asa due to allergies.     I was paged to the bedside to evaluate Mr. Aydin Blanc for an acute episode of dizziness and light-headedness when he attempted to stand to use the bathroom. Upon my arrival patient was lying supine and in no acute distress. BP was 87/55 mmHg and patient denied light-headedness, chest pain, numbness, tingling, or palpitations. Patient endorsed feeling like he needed to have a bowel movement so staff assisted him to stand to evaluate an orthostatic BP but as soon as he stood he became pale and dizzy again so he was assisted back to bed. When lying down his symptoms quickly resolved. LR bolus started. EKG done which did show slight J point elevation in leads III and aVF as well as slight ST depression in leads aVL, improved from his initial EKG but these changes were significant compared to post-procedure EKG from the morning of 7/27. Patient was asymptomatic and troponin continues to trend down. Patient's BP responded well to IVF bolus, lactic acid returned at 3.3 so some element of hypovolemia suspected.     Near what would have been the end of the RRT patient had 2 episodes of hematemesis with large clots and dark red blood. Patient also endorsed feeling like he needed to have a BM. Patient's BP dropped again and another IVF bolus was  started. Hb returned at 7.6 down from 11.1 on 7/26. Patient had started on Eliquis on 7/15/2025 for incidental finding of a small PE. EGD done 05/22/2025 by [facility] where it was determined that he needed a stent which was done in June. [facility] notified of the hematemesis and they have kindly offered to perform an upper GI endoscopy if patient is in the ICU. I discussed this with the intensivist, Dr. Moya, who accepted the patient for transfer.    Discussed the above with patient's wife and daughter. Currently guarded prognosis until it is known whether the bleeding can be controlled.    Diagnosis:  Acute blood loss anemia suspected 2/2 GI bleeding  Hemorrhagic shock  Lactic acidosis suspect 2/2 above and metastatic disease   - Upper GI endoscopy per [facility], await findings  - Transfuse PRBC to maintain hemoglobin > 7.0  - Closely monitor hemodynamic status in ICU  - Discussed with cardiology for recommendations  *Hold Eliquis   *Brilinta held tonight, continue for next dose given new stent  Plan:  -- EKG  -- Labs   *CBC   *Troponin   *BMP   *Magnesium   *BNP   *Type and screen  -- Transfer to ICU  -- Upper GI endoscopy per [facility]  -- Prepare and transfuse 1 unit PRBC emergently (O neg)  -- Serial hemoglobins  -- Kcentra 4500 units following endoscopy  -- Norepinephrine to maintain MAP > 65 mmHg if unable to maintain with IVF and PRBC    At the conclusion of this RRT patient was hemodynamically unstable and will transfer to ICU.    His history is significant for:  Past Medical History:   Diagnosis Date    Conductive hearing loss     Hearing problem     Tinnitus      Past Surgical History:   Procedure Laterality Date    ENT SURGERY         Review of Systems   The 10 point Review of Systems is negative other than noted in the HPI or here.     Allergies   Allergies   Allergen Reactions    Aspirin Swelling     Pt reports throat swelling    Codeine Hives     Throat Swelling    Fish Flavor [Flavoring Agent (Non-Screening)] Hives     Hydrocodone Hives     Throat Swelling    Morphine Hives     Throat Swelling    Nsaids Hives     Throat Swelling    Oxycodone Hives     Throat Swelling       Physical Exam   Physical Exam  Constitutional:       Appearance: He is ill-appearing.   HENT:      Head: Normocephalic.      Mouth/Throat:      Mouth: Mucous membranes are dry.   Eyes:      Pupils: Pupils are equal, round, and reactive to light.   Cardiovascular:      Rate and Rhythm: Normal rate and regular rhythm.      Pulses: Normal pulses.      Heart sounds: Normal heart sounds.   Pulmonary:      Effort: Pulmonary effort is normal.      Breath sounds: Normal breath sounds.   Abdominal:      General: Abdomen is flat.      Palpations: Abdomen is soft.      Comments: Nausea     Musculoskeletal:      Right lower leg: No edema.      Left lower leg: No edema.   Skin:     General: Skin is warm and dry.      Capillary Refill: Capillary refill takes less than 2 seconds.      Coloration: Skin is pale.   Neurological:      General: No focal deficit present.      Mental Status: He is alert and oriented to person, place, and time.   Psychiatric:         Mood and Affect: Mood normal.         Vital Signs with Ranges:  Temp:  [97.4  F (36.3  C)-98.2  F (36.8  C)] 98.2  F (36.8  C)  Pulse:  [] 72  Resp:  [16-18] 16  BP: ()/(55-86) 87/55  SpO2:  [89 %-98 %] 97 %  I/O last 3 completed shifts:  In: 450 [P.O.:450]  Out: 1800 [Urine:1800]    Data   Recent Results (from the past 24 hours)   Troponin T, High Sensitivity   Result Value Ref Range    Troponin T, High Sensitivity 152 (HH) <=22 ng/L   EKG 12-lead, tracing only   Result Value Ref Range    Systolic Blood Pressure  mmHg    Diastolic Blood Pressure  mmHg    Ventricular Rate 73 BPM    Atrial Rate 73 BPM    RI Interval 196 ms    QRS Duration 108 ms     ms    QTc 484 ms    P Axis 33 degrees    R AXIS 7 degrees    T Axis 101 degrees    Interpretation ECG       Sinus rhythm with occasional Premature ventricular  complexes  Cannot rule out Inferior infarct , age undetermined  Abnormal ECG     Echocardiogram Complete   Result Value Ref Range    LVEF  50-55%     [facility]    [id number]  [id number]  [id number]  [id number]^MADIE^CAROLYN^SAL     [facility]  Echocardiography Laboratory  [address] [place] [address]     Name: SRINIVAS WRIGHT  MRN: [id number]  : 1945  Study Date: 2025 12:58 PM  Age: 80 yrs  Gender: Male  Patient Location: [facility]  Reason For Study: Acute Myocardial Infarction  Ordering Physician: CAROLYN RAMACHANDRAN  Performed By: ROS Zhong     BSA: 2.0 m2  Height: 68 in  Weight: 193 lb  HR: 72  BP: 144/72 mmHg  ______________________________________________________________________________  Procedure  Echocardiogram with two-dimensional, color and spectral Doppler. Definity (NDC  #14633-819) given intravenously. Contrast Definity. Technically difficult  study.  ______________________________________________________________________________  Interpretation Summary     Left ventricular systolic function is low normal.  The visual ejection fraction is 50-55%.  Aortic root is measured 4.7cm, Asc Ao is 4.6cm Technically difficult,  suboptimal study. There is no comparison study available.  ______________________________________________________________________________  Left Ventricle  The left ventricle is normal in size. Left ventricular systolic function is  low normal. The visual ejection fraction is 50-55%. Diastolic Doppler findings  (E/E' ratio and/or other parameters) suggest left ventricular filling  pressures are normal. No regional wall motion abnormalities noted.     Right Ventricle  The right ventricle is grossly normal size. Right ventricular function cannot  be assessed due to poor image quality.     Atria  The left atrium is severely dilated. Right atrium not well visualized. The  atrial septum is aneurysmal.     Mitral Valve  The mitral valve is normal in  structure and function. There is trace mitral  regurgitation. There is no mitral valve stenosis.     Tricuspid Valve  The tricuspid valve is not well visualized. Right ventricular systolic  pressure could not be approximated due to inadequate tricuspid regurgitation.     Aortic Valve  The aortic valve is trileaflet. No aortic regurgitation is present. No  hemodynamically significant valvular aortic stenosis.     Pulmonic Valve  The pulmonic valve is not well visualized. Normal pulmonic valve velocity.     Vessels  The aortic Sinus(es) of Valsalva are moderately dilated. Ascending aorta  dilatation is present.     Pericardium  The pericardium is not well visualized.     ______________________________________________________________________________  MMode/2D Measurements & Calculations  IVSd: 1.0 cm  LVIDd: 4.5 cm  LVIDs: 2.9 cm  LVPWd: 1.0 cm  FS: 35.6 %     LV mass(C)d: 153.3 grams  LV mass(C)dI: 76.1 grams/m2  Ao root diam: 4.7 cm  asc Aorta Diam: 4.6 cm  LVOT diam: 2.3 cm  LVOT area: 4.2 cm2  Ao root diam index Ht(cm/m): 2.7  Ao root diam index BSA (cm/m2): 2.3  Asc Ao diam index BSA (cm/m2): 2.3  Asc Ao diam index Ht(cm/m): 2.7  EF Biplane: 42.5 %  LA Volume (BP): 71.0 ml     LA Volume Index (BP): 35.3 ml/m2  RWT: 0.44  TAPSE: 1.5 cm     Doppler Measurements & Calculations  MV E max moises: 39.1 cm/sec  MV A max moises: 62.1 cm/sec  MV E/A: 0.63  MV dec slope: 133.0 cm/sec2  MV dec time: 0.29 sec  PA acc time: 0.07 sec  E/E' av.8  Lateral E/e': 6.3  Medial E/e': 7.3     ______________________________________________________________________________  Report approved by: Danna Moffett MD on 2025 02:46 PM         Glucose by meter   Result Value Ref Range    GLUCOSE BY METER POCT 122 (H) 70 - 99 mg/dL   Lactic acid whole blood   Result Value Ref Range    Lactic Acid 3.3 (H) 0.7 - 2.0 mmol/L   Basic Metabolic Panel (Limited Occurrences)   Result Value Ref Range    Sodium 136 135 - 145 mmol/L    Potassium 4.1  3.4 - 5.3 mmol/L    Chloride 103 98 - 107 mmol/L    Carbon Dioxide (CO2) 21 (L) 22 - 29 mmol/L    Anion Gap 12 7 - 15 mmol/L    Urea Nitrogen 50.8 (H) 8.0 - 23.0 mg/dL    Creatinine 0.83 0.67 - 1.17 mg/dL    GFR Estimate 88 >60 mL/min/1.73m2    Calcium 8.8 8.8 - 10.4 mg/dL    Glucose 118 (H) 70 - 99 mg/dL   Magnesium (Limited Occurrences)   Result Value Ref Range    Magnesium 1.9 1.7 - 2.3 mg/dL   Troponin T, High Sensitivity   Result Value Ref Range    Troponin T, High Sensitivity 112 (HH) <=22 ng/L   EKG 12-lead, tracing only   Result Value Ref Range    Systolic Blood Pressure  mmHg    Diastolic Blood Pressure  mmHg    Ventricular Rate 89 BPM    Atrial Rate 89 BPM    DE Interval 178 ms    QRS Duration 96 ms     ms    QTc 554 ms    P Axis 48 degrees    R AXIS -14 degrees    T Axis 98 degrees    Interpretation ECG       Sinus rhythm with Fusion complexes  Inferior infarct (cited on or before 26-Jul-2025)  Cannot rule out Anterior infarct , new  ** ** ACUTE MI / STEMI ** **  Consider right ventricular involvement in acute inferior infarct  Abnormal ECG  When compared with ECG of 27-Jul-2025 07:47, (unconfirmed)  Fusion complexes are now Present  Premature ventricular complexes are no longer Present  Acute Anterior infarct is now Present     CBC with Platelets (Limited Occurrences)   Result Value Ref Range    WBC Count 14.5 (H) 4.0 - 11.0 10e3/uL    RBC Count 2.77 (L) 4.40 - 5.90 10e6/uL    Hemoglobin 7.6 (L) 13.3 - 17.7 g/dL    Hematocrit 23.8 (L) 40.0 - 53.0 %    MCV 86 78 - 100 fL    MCH 27.4 26.5 - 33.0 pg    MCHC 31.9 31.5 - 36.5 g/dL    RDW 15.4 (H) 10.0 - 15.0 %    Platelet Count 364 150 - 450 10e3/uL   NT-proBNP   Result Value Ref Range    NT-proBNP 1,927 (H) 0 - 852 pg/mL   Basic Metabolic Panel (Limited Occurrences)   Result Value Ref Range    Sodium 136 135 - 145 mmol/L    Potassium 4.4 3.4 - 5.3 mmol/L    Chloride 102 98 - 107 mmol/L    Carbon Dioxide (CO2) 22 22 - 29 mmol/L    Anion Gap 12 7 - 15  mmol/L    Urea Nitrogen 52.8 (H) 8.0 - 23.0 mg/dL    Creatinine 0.83 0.67 - 1.17 mg/dL    GFR Estimate 88 >60 mL/min/1.73m2    Calcium 8.7 (L) 8.8 - 10.4 mg/dL    Glucose 111 (H) 70 - 99 mg/dL   ABO/Rh type and screen    Narrative    The following orders were created for panel order ABO/Rh type and screen.  Procedure                               Abnormality         Status                     ---------                               -----------         ------                     Adult Type and Screen[[id number]]                           Final result                 Please view results for these tests on the individual orders.   Troponin T, High Sensitivity   Result Value Ref Range    Troponin T, High Sensitivity 121 (HH) <=22 ng/L   Adult Type and Screen   Result Value Ref Range    ABO/RH(D) O POS     Antibody Screen Negative Negative    SPECIMEN EXPIRATION DATE 7/31/2025 11:59:00 PM CDT    Prepare red blood cells (unit)   Result Value Ref Range    ISSUE DATE AND TIME 7/28/2025  1:44:00 AM CDT     Blood Component Type Red Blood Cells     Product Code [id number]     Unit Status Issued     Unit Number [id number]     UNIT ABO/RH O+     CODING SYSTEM [id number]     UNIT TYPE ISBT 5100      COVID-19 PCR Results           No data to display              COVID-19 Antibody Results, Testing for Immunity           No data to display              EKG:  Interpreted by DERRICK Perdomo CNP  Time reviewed: 0050  Symptoms at time of EKG: Hypotension   Rhythm: normal sinus   Rate: Normal  Axis: Normal  Ectopy: none  Conduction: normal  ST Segments/ T Waves: ST Segement Elevation III and aVF  Q Waves: none  Comparison to prior: Increased J point elevation compared to EKG from 7/27  Clinical Impression: myocardial ischemia    Time Spent on this Encounter   I spent 60 minutes of critical care time on the unit/floor managing the care of Aydin Blanc. Upon evaluation, this patient had a high probability of imminent or  life-threatening deterioration due to hemorrhagic shock, which required my direct attention, intervention, and personal management. 100% of my time was spent at the bedside counseling the patient and/or coordinating care regarding services listed in this note.      DERRICK Perdomo, CNP  Hospitalist - House JO[facility]  Text me on the [employer] jose for a textback

## 2025-07-28 NOTE — CONSULTS
[facility]    ICU History and Physical    Primary Team: Hospitalist  Reason for Critical Care Admission: UGIB, esophageal adenocarcinoma, CAD  Admitting Physician: Fernando Hatfield MD  Date of Admission:  7/26/2025    Assessment: Critical Care   Aydin Blanc is a 80 year old male admitted on 7/26/2025. Recent MI s/p RCA stent x 2 on Brilinta and Eliquos transferred to ICU due to UGIB in setting of known esophageal cancer.  Decrease in hemoglobin with associated hypotension.  EGD with clot at distal stent but no active bleeding at present.       Plan: Critical Care   Neuro/ pain/ sedation:  Normal mental status at present.  Fentanyl prn for abdominal pain.    Pulmonary:  On supplemental oxygen.  Recent PE- was on Eliqous; will need to be off anticoagulation in setting of GIB.  Follow for signs of CHF with fluid and blood resuscitation.    Cardiovascular:  CAD with recent inferior wall MI s/p stenting x 2.  On Brilinta.  No chest pain at present. Troponin mildly increased compared to admission.  Now hypotensive.  - Follow for chest pain  - Will recheck troponin serially  - Hold Brilinta and Eliqous  - Cardiology following  - Levophed for MAP > 65; assess for weaning as getting blood products    GI/Nutrition:  Esophageal CA with stenting, receiving chemotherapy.  Now with UGIB distal to stent; decrease in hemoglobin.  No therapeutics available by EGD. Will reverse Eliquos with KCentra, serial hemoglobin, hold Brilinta.  If continues to have GIB will discuss with IR possibility for embolization (concerns regarding developing esophageal necrosis)    - PPI  - Serial Hb; goal Hb > 9 with recent MI  - KCentra    Renal/ Fluid Balance:   Creatinine at baseline; follow UOP    Endocrine:  - Blood glucos monitoring per ICU    ID:  No active issues    Hematology:  GIB in setting of anticoagulation- being reveresed.    MSK:   - PT and OT consulted. Appreciate recommendations.     Lines/ tubes/  drains:  Rae Catheter: Not present  Lines: PRESENT      Port a Cath 07/26/25 Right Chest wall-Site Assessment: WDL        Prophylaxis:  - DVT Prophylaxis: Pneumatic Compression Devices  - PUD Prophylaxis: PPI    Code Status: Full Code      Disposition:  - ICU      Critical care time exclusive of procedures: 70 minutes    Clinically Significant Risk Factors                # Coagulation Defect: INR = 1.21 (Ref range: 0.85 - 1.15) and/or PTT = 30 Seconds (Ref range: 22 - 38 Seconds), will monitor for bleeding                               Pito Moya MD  [facility]  Securely message with [employer] (more info)  Text page via [facility] Paging/Directory     ______________________________________________________________________    Chief Complaint   Hemetemesis    History is obtained from the patient    History of Present Illness   Aydin Blanc is a 80 year old male who has a history of CAD, recent PE on Eliquos, and esophageal adenocarcinoma admitted on 7-26-25 with chest pain and findings of an inferior MI.  Taken to the cath lab yesterday and found to have a 99% mid RCA lesion that underwent stenting x 2. Was started on Brilinta with continuation of Eliquis.  This evening felt faint, had episode of hemetemesis of ~ 300 ml with clot; had near syncopal episode standing up this evening.  RRT called; received IVF bolus.  Check of hemoglobin was 7.6 (decreased from 11.1 on admission).  One unit PRBC infusing at time of transfer to ICU.  On admission to ICU was awake and interactive, c/o diffuse abdominal pain.  Developed hypotension- received 2 additional 500 ml fluid boluses, 2nd unit PRBC ordered.  Given 50 mcg fentanyl; started Levophed for hypotension.    GI consulted- bedside EGD showed clot in esophagus at distal end of esophageal stent, no active bleeding present at time of scope; not able to visualize past distal end of stent.    Review of Systems    The 10 point Review of Systems  is negative other than noted in the HPI or here.     Past Medical History    I have reviewed this patient's medical history and updated it with pertinent information if needed.   Past Medical History:   Diagnosis Date    Conductive hearing loss     Hearing problem     Tinnitus        Past Surgical History   I have reviewed this patient's surgical history and updated it with pertinent information if needed.  Past Surgical History:   Procedure Laterality Date    ENT SURGERY         Social History   I have reviewed this patient's social history and updated it with pertinent information if needed.  Social History     Tobacco Use    Smoking status: Light Smoker     Current packs/day: 0.00     Average packs/day: 25.0 packs/day for 20.0 years (500.0 ttl pk-yrs)     Types: Cigarettes     Start date: 1948     Last attempt to quit: 1968     Years since quittin.9       Family History   I have reviewed this patient's family history and updated it with pertinent information if needed.  Family History   Problem Relation Age of Onset    Cancer Brother        Prior to Admission Medications   Prior to Admission Medications   Prescriptions Last Dose Informant Patient Reported? Taking?   acetaminophen (TYLENOL) 325 MG tablet 2025 Morning  Yes Yes   Sig: Take 325-650 mg by mouth every 6 hours as needed for mild pain.   apixaban ANTICOAGULANT (ELIQUIS) 5 MG tablet 2025 Morning  Yes Yes   Sig: Take 5 mg by mouth 2 times daily.   dexAMETHasone (DECADRON) 4 MG tablet 2025 Morning  Yes Yes   Sig: Take 8 mg by mouth daily. On days 2 & 3 of cycle. Patient last received chemo    lidocaine-prilocaine (EMLA) 2.5-2.5 % external cream   Yes Yes   Sig: Apply topically daily as needed for moderate pain. Port site   ondansetron (ZOFRAN) 8 MG tablet   Yes Yes   Sig: Take 8 mg by mouth every 8 hours as needed for nausea.   prochlorperazine (COMPAZINE) 5 MG tablet   Yes Yes   Sig: Take 5 mg by mouth every 6 hours as  needed for nausea or vomiting.      Facility-Administered Medications: None     Allergies   Allergies   Allergen Reactions    Aspirin Swelling     Pt reports throat swelling    Codeine Hives     Throat Swelling    Fish Flavor [Flavoring Agent (Non-Screening)] Hives    Hydrocodone Hives     Throat Swelling    Morphine Hives     Throat Swelling    Nsaids Hives     Throat Swelling    Oxycodone Hives     Throat Swelling       Physical Exam   Vital Signs: Temp: 97.7  F (36.5  C) Temp src: Axillary BP: (!) 80/52 Pulse: 106   Resp: 20 SpO2: 92 % O2 Device: Oxymask Oxygen Delivery: 12 LPM  Weight: 193 lbs 8 oz    General: Awake and interactive; uncomfortable secondary to abdominal pain  HEENT: PERRL, EOMIB, sclera clear, no JVD  Neuro: Awake and alert.  Moves all 4 extremities purposefully  CV: Tachy without mgr  Pulm: Decreased BS in bases bilaterally  Abd: Hypoactive BS, distended, tender to palpation  : Deferred  Extremities: No edema  Skin: No concerning lesions  Incisions: N/A  Psych: Normal affect and mood    Data   I reviewed all medications, new labs and imaging results over the last 24 hours.  Arterial Blood Gases   No lab results found in last 7 days.    Complete Blood Count   Recent Labs   Lab 07/28/25  0125 07/26/25  1805   WBC 14.5* 9.8   HGB 7.6* 11.1*    312       Basic Metabolic Panel  Recent Labs   Lab 07/28/25  0125 07/28/25  0044 07/28/25  0012 07/26/25  2106 07/26/25  1805    136  --   --  136   POTASSIUM 4.4 4.1  --   --  4.5   CHLORIDE 102 103  --   --  99   CO2 22 21*  --   --  21*   BUN 52.8* 50.8*  --   --  26.4*   CR 0.83 0.83  --   --  0.77   * 118* 122* 145* 168*       Liver Function Tests  Recent Labs   Lab 07/26/25  1805   INR 1.21*       Pancreatic Enzymes  No lab results found in last 7 days.    Coagulation Profile  Recent Labs   Lab 07/26/25  1805   INR 1.21*   PTT 30       IMAGING:  Recent Results (from the past 24 hours)   Echocardiogram Complete   Result Value     LVEF  50-55%    [facility]    [id number]  [id number]  [id number]  [id number]^MADIE^CAROLYN^SAL     [facility]  Echocardiography Laboratory  [address] [place] [address]     Name: SRINIVAS WRIGHT  MRN: [id number]  : 1945  Study Date: 2025 12:58 PM  Age: 80 yrs  Gender: Male  Patient Location: [facility]  Reason For Study: Acute Myocardial Infarction  Ordering Physician: CAROLYN RAMACHANDRAN  Performed By: ROS Zhong     BSA: 2.0 m2  Height: 68 in  Weight: 193 lb  HR: 72  BP: 144/72 mmHg  ______________________________________________________________________________  Procedure  Echocardiogram with two-dimensional, color and spectral Doppler. Definity (NDC  #39785-679) given intravenously. Contrast Definity. Technically difficult  study.  ______________________________________________________________________________  Interpretation Summary     Left ventricular systolic function is low normal.  The visual ejection fraction is 50-55%.  Aortic root is measured 4.7cm, Asc Ao is 4.6cm Technically difficult,  suboptimal study. There is no comparison study available.  ______________________________________________________________________________  Left Ventricle  The left ventricle is normal in size. Left ventricular systolic function is  low normal. The visual ejection fraction is 50-55%. Diastolic Doppler findings  (E/E' ratio and/or other parameters) suggest left ventricular filling  pressures are normal. No regional wall motion abnormalities noted.     Right Ventricle  The right ventricle is grossly normal size. Right ventricular function cannot  be assessed due to poor image quality.     Atria  The left atrium is severely dilated. Right atrium not well visualized. The  atrial septum is aneurysmal.     Mitral Valve  The mitral valve is normal in structure and function. There is trace mitral  regurgitation. There is no mitral valve stenosis.     Tricuspid Valve  The tricuspid  valve is not well visualized. Right ventricular systolic  pressure could not be approximated due to inadequate tricuspid regurgitation.     Aortic Valve  The aortic valve is trileaflet. No aortic regurgitation is present. No  hemodynamically significant valvular aortic stenosis.     Pulmonic Valve  The pulmonic valve is not well visualized. Normal pulmonic valve velocity.     Vessels  The aortic Sinus(es) of Valsalva are moderately dilated. Ascending aorta  dilatation is present.     Pericardium  The pericardium is not well visualized.     ______________________________________________________________________________  MMode/2D Measurements & Calculations  IVSd: 1.0 cm  LVIDd: 4.5 cm  LVIDs: 2.9 cm  LVPWd: 1.0 cm  FS: 35.6 %     LV mass(C)d: 153.3 grams  LV mass(C)dI: 76.1 grams/m2  Ao root diam: 4.7 cm  asc Aorta Diam: 4.6 cm  LVOT diam: 2.3 cm  LVOT area: 4.2 cm2  Ao root diam index Ht(cm/m): 2.7  Ao root diam index BSA (cm/m2): 2.3  Asc Ao diam index BSA (cm/m2): 2.3  Asc Ao diam index Ht(cm/m): 2.7  EF Biplane: 42.5 %  LA Volume (BP): 71.0 ml     LA Volume Index (BP): 35.3 ml/m2  RWT: 0.44  TAPSE: 1.5 cm     Doppler Measurements & Calculations  MV E max moises: 39.1 cm/sec  MV A max moises: 62.1 cm/sec  MV E/A: 0.63  MV dec slope: 133.0 cm/sec2  MV dec time: 0.29 sec  PA acc time: 0.07 sec  E/E' av.8  Lateral E/e': 6.3  Medial E/e': 7.3     ______________________________________________________________________________  Report approved by: Danna Moffett MD on 2025 02:46 PM

## 2025-07-28 NOTE — PLAN OF CARE
"Goal Outcome Evaluation:      Plan of Care Reviewed With: patient, spouse, child    Overall Patient Progress: improvingOverall Patient Progress: improving    Outcome Evaluation: Patient remained off of vasoactive gtts all day. Orthostatic when lying to sitting, 1L LR given today. Diet advanced, poor apetite but tolerating fluids OK. SR/ST. -120s. 2L nasal cannula. Using urinal. Large BM today, dark brown. Wife and daughter at bedside throughout the day.          Problem: Adult Inpatient Plan of Care  Goal: Patient-Specific Goal (Individualized)  Description: You can add care plan individualizations to a care plan. Examples of Individualization might be:  \"Parent requests to be called daily at 9am for status\", \"I have a hard time hearing out of my right ear\", or \"Do not touch me to wake me up as it startles  me\".  Outcome: Met     Problem: Adult Inpatient Plan of Care  Goal: Absence of Hospital-Acquired Illness or Injury  Intervention: Prevent and Manage VTE (Venous Thromboembolism) Risk  Recent Flowsheet Documentation  Taken 7/28/2025 1600 by Yasmin Kuhn RN  VTE Prevention/Management: SCDs on (sequential compression devices)  Taken 7/28/2025 1200 by Yasmin Kuhn, [place]  VTE Prevention/Management: SCDs on (sequential compression devices)  Taken 7/28/2025 0800 by Yasmin Kuhn, RN  VTE Prevention/Management: SCDs on (sequential compression devices)     "

## 2025-07-28 NOTE — PRE-PROCEDURE
INPATIENT PRE-PROCEDURE NOTE    CHIEF COMPLAINT / REASON FOR PROCEDURE: hematemesis    Admission History and Physical Reviewed, including past medical history, social history family history, ROS, and interval progress notes: on chart-<30 days old; updated within 7 days.    PRE-SEDATION ASSESSMENT:  Lung Exam: normal  Heart Exam: normal  Airway Exam: Normal  Previous reaction to anesthesia/sedation: NO  Sedation plan based on assessment:Moderate (conscious) sedation  ASA Classification: 2 - Mild systemic disease       IMPRESSION: hematemesis    PLAN: KRISSY Russell MD

## 2025-07-28 NOTE — PLAN OF CARE
[facility]     ICU Nursing Progress Note 0220-0700    NEURO: A/Ox4. Forgetful. Intact. Generalized weakness.      CV: SR 70-80's. Levo for MAP >65. 2 units PRBC. 2L Bolus. Trop trending down. Hgb stable.      RESP: Weaned from 15L oxymask to 4 L NC. Clear lungs.      GI:EGD done for bloody emesis. NPO. Hyperactive bowel sounds. Patient really wants to get up to commode to have bowel movement, explained he needs to use bed pan while unstable.      : Bladder scanned at 0715 for 401. External cath placed.      SKIN: Intact.      BG: Stable.      GTTS: Levo off at 0700.      ACCESS: Right Port. Right PIVx2.    PAIN: Fent 50 mcg given x1 for pain. Versed and Fent for sedation during EGD. Patient has a lot of allergies to medications.     PLAN: Cards. GI. Intensivist. Discuss plan of care going forward for bleeding.       Goal Outcome Evaluation:         Problem: Adult Inpatient Plan of Care  Goal: Optimal Comfort and Wellbeing  Outcome: Not Progressing  Intervention: Monitor Pain and Promote Comfort  Recent Flowsheet Documentation  Taken 7/28/2025 0220 by Jovanna Dominguez, RN  Pain Management Interventions: medication (see MAR)  Intervention: Provide Person-Centered Care  Recent Flowsheet Documentation  Taken 7/28/2025 0220 by Jovanna Dominguez, RN  Trust Relationship/Rapport:   care explained   choices provided   emotional support provided

## 2025-07-28 NOTE — PROGRESS NOTES
0000: Pt asked to use the bathroom, denied feeling lightheaded. BP 87/55  Upon standing, pt report feeling lightheaded and, became pale. RRT called. Please see CNP office

## 2025-07-28 NOTE — PLAN OF CARE
[id number]  Neuro: Alert and oriented x 4  Vital signs: VSS  Respiratory: RA  Pain:Denies CP  Tele:SB/SR  Mobility:Assists of 1  Drips/Drains/IVF:PORT in place  Skin:WNL  Diet:Cardiac  GI/:Voiding  Aggression color:Green  Plan/Test/Consult:  Labs:Trop trending up, no CP  Misc:  angio site on the R radial, C/D/I, CMS intact. Declined bed alarm, call light within reach.      Please see RRT note

## 2025-07-29 ENCOUNTER — APPOINTMENT (OUTPATIENT)
Dept: OCCUPATIONAL THERAPY | Facility: CLINIC | Age: 80
DRG: 323 | End: 2025-07-29
Payer: COMMERCIAL

## 2025-07-29 LAB
ALBUMIN SERPL BCG-MCNC: 2.5 G/DL (ref 3.5–5.2)
ALP SERPL-CCNC: 148 U/L (ref 40–150)
ALT SERPL W P-5'-P-CCNC: 25 U/L (ref 0–70)
ANION GAP SERPL CALCULATED.3IONS-SCNC: 9 MMOL/L (ref 7–15)
AST SERPL W P-5'-P-CCNC: 21 U/L (ref 0–45)
ATRIAL RATE - MUSE: 73 BPM
BILIRUB SERPL-MCNC: 0.4 MG/DL
BLD PROD TYP BPU: NORMAL
BLD PROD TYP BPU: NORMAL
BLOOD COMPONENT TYPE: NORMAL
BLOOD COMPONENT TYPE: NORMAL
BUN SERPL-MCNC: 45.6 MG/DL (ref 8–23)
CALCIUM SERPL-MCNC: 8.1 MG/DL (ref 8.8–10.4)
CHLORIDE SERPL-SCNC: 105 MMOL/L (ref 98–107)
CODING SYSTEM: NORMAL
CODING SYSTEM: NORMAL
CREAT SERPL-MCNC: 0.82 MG/DL (ref 0.67–1.17)
CROSSMATCH: NORMAL
CROSSMATCH: NORMAL
DIASTOLIC BLOOD PRESSURE - MUSE: NORMAL MMHG
EGFRCR SERPLBLD CKD-EPI 2021: 88 ML/MIN/1.73M2
GLUCOSE BLDC GLUCOMTR-MCNC: 134 MG/DL (ref 70–99)
GLUCOSE SERPL-MCNC: 100 MG/DL (ref 70–99)
HCO3 SERPL-SCNC: 23 MMOL/L (ref 22–29)
HGB BLD-MCNC: 7.4 G/DL (ref 13.3–17.7)
HGB BLD-MCNC: 7.8 G/DL (ref 13.3–17.7)
HGB BLD-MCNC: 8 G/DL (ref 13.3–17.7)
HGB BLD-MCNC: 8.7 G/DL (ref 13.3–17.7)
INTERPRETATION ECG - MUSE: NORMAL
ISSUE DATE AND TIME: NORMAL
ISSUE DATE AND TIME: NORMAL
MAGNESIUM SERPL-MCNC: 2.4 MG/DL (ref 1.7–2.3)
MCV RBC AUTO: 81 FL (ref 78–100)
MCV RBC AUTO: 82 FL (ref 78–100)
P AXIS - MUSE: 33 DEGREES
PHOSPHATE SERPL-MCNC: 2 MG/DL (ref 2.5–4.5)
POTASSIUM SERPL-SCNC: 4 MMOL/L (ref 3.4–5.3)
PR INTERVAL - MUSE: 196 MS
PROT SERPL-MCNC: 4.6 G/DL (ref 6.4–8.3)
QRS DURATION - MUSE: 108 MS
QT - MUSE: 440 MS
QTC - MUSE: 484 MS
R AXIS - MUSE: 7 DEGREES
SODIUM SERPL-SCNC: 137 MMOL/L (ref 135–145)
SYSTOLIC BLOOD PRESSURE - MUSE: NORMAL MMHG
T AXIS - MUSE: 101 DEGREES
UNIT ABO/RH: NORMAL
UNIT ABO/RH: NORMAL
UNIT NUMBER: NORMAL
UNIT NUMBER: NORMAL
UNIT STATUS: NORMAL
UNIT STATUS: NORMAL
UNIT TYPE ISBT: 5100
UNIT TYPE ISBT: 5100
VENTRICULAR RATE- MUSE: 73 BPM

## 2025-07-29 PROCEDURE — 250N000013 HC RX MED GY IP 250 OP 250 PS 637: Performed by: NURSE PRACTITIONER

## 2025-07-29 PROCEDURE — 99233 SBSQ HOSP IP/OBS HIGH 50: CPT | Performed by: INTERNAL MEDICINE

## 2025-07-29 PROCEDURE — 258N000003 HC RX IP 258 OP 636: Performed by: INTERNAL MEDICINE

## 2025-07-29 PROCEDURE — 84100 ASSAY OF PHOSPHORUS: CPT | Performed by: NURSE PRACTITIONER

## 2025-07-29 PROCEDURE — 82947 ASSAY GLUCOSE BLOOD QUANT: CPT | Performed by: NURSE PRACTITIONER

## 2025-07-29 PROCEDURE — 97110 THERAPEUTIC EXERCISES: CPT | Mod: GO | Performed by: OCCUPATIONAL THERAPIST

## 2025-07-29 PROCEDURE — 210N000001 HC R&B IMCU HEART CARE

## 2025-07-29 PROCEDURE — 85018 HEMOGLOBIN: CPT | Performed by: HOSPITALIST

## 2025-07-29 PROCEDURE — 250N000011 HC RX IP 250 OP 636: Performed by: INTERNAL MEDICINE

## 2025-07-29 PROCEDURE — 99233 SBSQ HOSP IP/OBS HIGH 50: CPT | Performed by: HOSPITALIST

## 2025-07-29 PROCEDURE — 97530 THERAPEUTIC ACTIVITIES: CPT | Mod: GO | Performed by: OCCUPATIONAL THERAPIST

## 2025-07-29 PROCEDURE — 85018 HEMOGLOBIN: CPT | Performed by: NURSE PRACTITIONER

## 2025-07-29 PROCEDURE — 250N000009 HC RX 250: Performed by: INTERNAL MEDICINE

## 2025-07-29 PROCEDURE — 83735 ASSAY OF MAGNESIUM: CPT | Performed by: NURSE PRACTITIONER

## 2025-07-29 PROCEDURE — P9016 RBC LEUKOCYTES REDUCED: HCPCS | Performed by: INTERNAL MEDICINE

## 2025-07-29 RX ORDER — TICAGRELOR 90 MG/1
90 TABLET, FILM COATED ORAL 2 TIMES DAILY
Qty: 180 TABLET | Refills: 3 | Status: SHIPPED | OUTPATIENT
Start: 2025-07-29

## 2025-07-29 RX ADMIN — PANTOPRAZOLE SODIUM 40 MG: 40 INJECTION, POWDER, FOR SOLUTION INTRAVENOUS at 04:14

## 2025-07-29 RX ADMIN — Medication 5 ML: at 12:45

## 2025-07-29 RX ADMIN — Medication 5 ML: at 04:14

## 2025-07-29 RX ADMIN — PANTOPRAZOLE SODIUM 40 MG: 40 INJECTION, POWDER, FOR SOLUTION INTRAVENOUS at 17:15

## 2025-07-29 RX ADMIN — ATORVASTATIN CALCIUM 80 MG: 80 TABLET, FILM COATED ORAL at 20:25

## 2025-07-29 RX ADMIN — Medication 5 ML: at 18:23

## 2025-07-29 RX ADMIN — TICAGRELOR 90 MG: 90 TABLET, FILM COATED ORAL at 20:25

## 2025-07-29 RX ADMIN — SODIUM PHOSPHATE, MONOBASIC, MONOHYDRATE AND SODIUM PHOSPHATE, DIBASIC, ANHYDROUS 15 MMOL: 142; 276 INJECTION, SOLUTION INTRAVENOUS at 05:26

## 2025-07-29 RX ADMIN — Medication 5 ML: at 22:14

## 2025-07-29 RX ADMIN — TICAGRELOR 90 MG: 90 TABLET, FILM COATED ORAL at 08:11

## 2025-07-29 ASSESSMENT — ACTIVITIES OF DAILY LIVING (ADL)
ADLS_ACUITY_SCORE: 42
DEPENDENT_IADLS:: INDEPENDENT
ADLS_ACUITY_SCORE: 42

## 2025-07-29 NOTE — PROGRESS NOTES
[facility]    Medicine Progress Note - Hospitalist Service    Date of Admission:  7/26/2025  Date of Service: 07/28/2025    Assessment & Plan     Aydin Blanc is a 80 year old male with a history of MI, CAD s/p stent placement, DVT, PE anticoagulated with Eliquis, NSVT, and esophageal adenocarcinoma who presents to the ED via EMS with chest pressure with STEMI in the inferior leads with EMS.  Patient was taken to cath lab and noted to have 99% mid RCA disease that is heavily calcified.  He underwent PCI with 2 stent placement.  He has been started on Brillinta and did not receive asa due to allergies.    # Upper GIB suspected 2/2 bleeding esophageal tumor   # Esophageal carcinoma, currently undergoing chemotherapy (last infusion 7/25)  Assessment: Developed hematemesis evening of 7/27/25 and transferred to ICU 07/28. Resuscitated with 1 unit PRBCs and 2L crystalloid. Briefly required vasopressors, but has been weaned off.    EGD 7/28/25 --> clotted blood in the lower 1/3rd of the esophagus, cardia, and gastric fundus suspected from esophageal cancer. No active bleeding visualized under clotting. Unfortunately, his esophageal cancer is the source of his bleeding, this may be a difficult situation to control.   Plan:  - GI following, appreciate recs (signed off 7/28, will need to discuss DOAC):  IF recurrent bleeding, consult IR   Discuss acx/anti-plt therapy resumption as above --> Ok to resume brillinta 7/28, but continue to hold apixaban at this time.   - CTA chest/abd/pelvis 7/28 --> No active bleeding identified   - PPI IV BID continued  - daily CBC  - Diet: Ok for diet --> cardiac diet ordered    ## Acute inferior wall STEMI  # HLD  Patient is currently undergoing chemotherapy and completed his infusion this morning.  He presented with chest pain and EKG changes initial troponin 22 with suspected inferior wall MI.  He underwent emergent LHC with mid RCA disease at 99% heavily  "calcified and underwent JEROME x 2.  Hold Eliquis and Brilinta resumed for 12 months  ASA not given in setting of allergies  TTE -> Left ventricular systolic function is low normal.  The visual ejection fraction is 50-55%.  Continue telemetry monitoring  metoprolol succinate 25 mg daily   Increased atorvastatin to 80 mg daily (goal LDL <55)     ## Hx of DVT/PE  Holding Eliquis at this time due to GIB    ## Hx of Esophageal Cancer  Patient is followed by [facility] and recently underwent chemotherapy infusion day of admission.     Diet: cardiac diet  DVT Prophylaxis: Pneumatic Compression Devices  Rae Catheter: Not present  Lines: PRESENT                Diet: Low Saturated Fat Na <2400 mg    DVT Prophylaxis: Pneumatic Compression Devices  Rae Catheter: Not present  Lines: PRESENT      Port a Cath 07/26/25 Right Chest wall-Site Assessment: WDL      Cardiac Monitoring: ACTIVE order. Indication: imc  Code Status: Full Code      Clinically Significant Risk Factors                # Coagulation Defect: INR = 1.18 (Ref range: 0.85 - 1.15) and/or PTT = 30 Seconds (Ref range: 22 - 38 Seconds), will monitor for bleeding               # Overweight: Estimated body mass index is 29.23 kg/m  as calculated from the following:    Height as of this encounter: 1.74 m (5' 8.5\").    Weight as of this encounter: 88.5 kg (195 lb 1.7 oz)., PRESENT ON ADMISSION            Social Drivers of Health    Tobacco Use: High Risk (7/28/2025)    Patient History     Smoking Tobacco Use: Light Smoker     Smokeless Tobacco Use: Unknown          Disposition Plan     Medically Ready for Discharge: Anticipated in 2-4 Days             Shane Linder MD  Hospitalist Service  [facility]  Securely message with [employer] (more info)  Text page via [employer] Paging/Directory   ______________________________________________________________________    Interval History     Received sign out from ICU team  Weaned off pressors  No fevers  HD " stable    Physical Exam   Vital Signs: Temp: 98  F (36.7  C) Temp src: Oral BP: 110/66 Pulse: 70   Resp: 21 SpO2: 99 % O2 Device: Nasal cannula Oxygen Delivery: 2 LPM  Weight: 195 lbs 1.71 oz    Constitutional:  no apparent distress    ----------------------------------------------------------------------------------------    Medical Decision Making       25 MINUTES SPENT BY ME on the date of service doing chart review, history, exam, documentation & further activities per the note.      Data   ------------------------- PAST 24 HR DATA REVIEWED -----------------------------------------------    I have personally reviewed the following data over the past 24 hrs:    14.5 (H)  \   7.8 (L)   / 364     136 105 58.4 (H) /  144 (H)   4.1 20 (L) 0.87 \     Trop: 116 (HH) BNP: 1,927 (H)     Procal: N/A CRP: N/A Lactic Acid: 1.5       INR:  1.18 (H) PTT:  N/A   D-dimer:  N/A Fibrinogen:  N/A       Imaging results reviewed over the past 24 hrs:   Recent Results (from the past 24 hours)   XR Chest Port 1 View    Narrative    EXAM: XR CHEST PORT 1 VIEW  LOCATION: [facility]  DATE: 7/28/2025    INDICATION: acute respiratory failure  COMPARISON: Chest radiograph 7/26/2025. CT chest 1/18/2025      Impression    IMPRESSION: Slightly low lung volumes. No focal consolidation, pleural effusion or pneumothorax. Similar right chest port. Similar stent near the gastroesophageal junction.   CTA CHEST ABDOMEN PELVIS WITH CONTRAST PANEL    Narrative    EXAM: CTA CHEST ABDOMEN PELVIS W CONTRAST  LOCATION: [facility]  DATE: 7/28/2025    INDICATION: Upper GIB, suspected 2 2 esophageal cancer with clot noted in distal esophagus past stent. Evaluate for active bleeding  COMPARISON: 7/14/025  TECHNIQUE: CT angiogram chest abdomen pelvis during arterial phase of injection of IV contrast. 2D and 3D MIP reconstructions were performed by the CT technologist. Dose reduction techniques were used.    CONTRAST: 119mL isovue 370    FINDINGS:   CT ANGIOGRAM CHEST, ABDOMEN, AND PELVIS: Redemonstration of an enhancing mass in the gastroesophageal junction, consistent with known malignancy. Unchanged position of the stent at the gastric esophageal junction. No evidence of active contrast   extravasation at the distal aspect of the stent. No active gastrointestinal hemorrhage.    Mildly dilated ascending thoracic aorta measuring 4.1 cm. No aortic aneurysm or dissection. The celiac artery, superior mesenteric artery, bilateral renal arteries, and inferior mesenteric arteries are patent without significant stenosis. Moderate amount   of calcified atheromatous plaque in the iliac arteries without significant focal stenosis in the common iliac, internal iliac and external iliac arteries.    LUNGS AND PLEURA: Mild atelectasis in the lung bases. Mild emphysema. No infiltrate or pleural effusion. Punctate calcified granuloma in the right middle lobe is stable. No new or enlarging nodules.    MEDIASTINUM/AXILLAE: Stable thyroid nodules. Right IJ port catheter. No filling defects in the central pulmonary arteries. The small filling defect in a right lower lobe segmental pulmonary arteries seen on the prior chest CT is not well seen today due   to timing of the contrast bolus. GE junction mass as discussed above, similar in appearance to 4/17/2025. Unchanged position of the distal esophageal stent.    CORONARY ARTERY CALCIFICATION: Severe.    HEPATOBILIARY: Redemonstration of numerous hepatic metastases, stable since 4/17/2025 represent the lesions include a 3.1 cm lesion in the caudate lobe (series 9, image 113), previously measuring 3.0 cm and a 2.8 cm lesion in the central liver,   previously measuring 2.7 cm (9/105). Stable intrahepatic biliary ductal dilatation in the left hepatic lobe with likely due to obstruction by the central liver metastasis. Stable tumor thrombus occluding the left hepatic vein.  Cholelithiasis.    PANCREAS: Punctate pancreatic parenchymal calcifications. No peripancreatic inflammatory changes or ductal dilatation.    SPLEEN: Normal.    ADRENAL GLANDS: Normal.    KIDNEYS/BLADDER: Stable solid enhancing masses in the kidneys bilaterally, with the largest enhancing mass in the right lobe measuring 2.1 cm (series 9, image 178) and in the left kidney measuring 0.9 cm. Additional bilateral renal cysts are also stable.   No calculi or hydronephrosis.    BOWEL: Large amount of formed stool in the rectum. Colonic diverticulosis. No small bowel or colonic obstruction or inflammatory changes. See discussion above regarding the GE junction mass. Normal appendix.    LYMPH NODES: Stable upper abdominal lymphadenopathy. For example, a cluster of enlarged gastrohepatic measuring 5.1 x 3.8 cm previously measured 5.3 x 3.3 cm, unchanged (9/118). No new or enlarging lymph nodes.    PELVIC ORGANS: No pelvic masses. Stable fat-containing small left inguinal hernia. No ascites.    MUSCULOSKELETAL: Degenerative changes in the spine. No suspicious lesions in the bones.      Impression    IMPRESSION:  1.  No active gastrointestinal hemorrhage.  2.  Stable appearance of the known GE junction mass with a stent in place.  3.  Stable hepatic metastases and upper abdominal lymphadenopathy.  4.  Stable intrahepatic biliary ductal dilatation in the left hepatic lobe, likely due to obstruction by the central hepatic metastasis. Table tumor thrombus in the left hepatic vein.  5.  Stable enhancing bilateral renal masses, consistent with solid renal neoplasms such as oncocytoma or renal cell carcinomas.  6.  The small right lower lobe segmental pulmonary embolus seen on the prior chest CT is not well seen today due to timing of the contrast bolus.         ------------------------- ENCOUNTER LABS ----------------------------------------------------------------  Recent Labs   Lab 07/28/25  2149 07/28/25  1555 07/28/25  1015  07/28/25  0605 07/28/25  0125 07/28/25  0044 07/26/25  2106 07/26/25  1805   WBC  --   --   --   --  14.5*  --   --  9.8   HGB 7.8* 8.7* 9.1* 9.1* 7.6*  --   --  11.1*   MCV 81 81 81 82 86  --   --  85   PLT  --   --   --   --  364  --   --  312   INR  --   --   --  1.18*  --   --   --  1.21*   NA  --   --   --  136 136 136  --  136   POTASSIUM  --   --   --  4.1 4.4 4.1  --  4.5   CHLORIDE  --   --   --  105 102 103  --  99   CO2  --   --   --  20* 22 21*  --  21*   BUN  --   --   --  58.4* 52.8* 50.8*  --  26.4*   CR  --   --   --  0.87 0.83 0.83  --  0.77   ANIONGAP  --   --   --  11 12 12  --  16*   GAGE  --   --   --  8.9 8.7* 8.8  --  9.9   GLC  --   --   --  144* 111* 118*   < > 168*    < > = values in this interval not displayed.       Most Recent 3 CBC's:  Recent Labs   Lab Test 07/28/25  2149 07/28/25  1555 07/28/25  1015 07/28/25  0605 07/28/25 0125 07/26/25  1805   WBC  --   --   --   --  14.5* 9.8   HGB 7.8* 8.7* 9.1*   < > 7.6* 11.1*   MCV 81 81 81   < > 86 85   PLT  --   --   --   --  364 312    < > = values in this interval not displayed.     Most Recent 3 BMP's:  Recent Labs   Lab Test 07/28/25  0605 07/28/25 0125 07/28/25 0044    136 136   POTASSIUM 4.1 4.4 4.1   CHLORIDE 105 102 103   CO2 20* 22 21*   BUN 58.4* 52.8* 50.8*   CR 0.87 0.83 0.83   ANIONGAP 11 12 12   GAGE 8.9 8.7* 8.8   * 111* 118*     Most Recent 2 LFT's:No lab results found.  Most Recent 3 INR's:  Recent Labs   Lab Test 07/28/25  0605 07/26/25  1805   INR 1.18* 1.21*     Most Recent 3 Troponin's:No lab results found.  Most Recent 3 BNP's:  Recent Labs   Lab Test 07/28/25  0125   NTBNP 1,927*     Most Recent D-dimer:No lab results found.  Most Recent Cholesterol Panel:  Recent Labs   Lab Test 07/26/25 2120   CHOL 148   LDL 94   HDL 44   TRIG 49     Most Recent 6 Bacteria Isolates From Any Culture (See EPIC Reports for Culture Details):No lab results found.  Most Recent TSH and T4:  Recent Labs   Lab Test  07/26/25 2120   TSH 0.77     Most Recent Hemoglobin A1c:No lab results found.  Most Recent Urinalysis:No lab results found.  Most Recent ESR & CRP:No lab results found.

## 2025-07-29 NOTE — CONSULTS
Care Management Initial Consult    General Information  Assessment completed with: Patient, Spouse or significant other, Children, wife-Abena, Dtr Solis and son Michael  Type of CM/SW Visit: Initial Assessment    Primary Care Provider verified and updated as needed: Yes   Readmission within the last 30 days: no previous admission in last 30 days      Reason for Consult: discharge planning (elevated risk for re-admit)  Advance Care Planning: Advance Care Planning Reviewed: no concerns identified          Communication Assessment  Patient's communication style: spoken language (English or Bilingual)    Hearing Difficulty or Deaf: yes   Wear Glasses or Blind: no    Cognitive  Cognitive/Neuro/Behavioral: WDL  Level of Consciousness: alert  Arousal Level: opens eyes spontaneously  Orientation: oriented x 4  Mood/Behavior: calm, cooperative  Best Language: 0 - No aphasia  Speech: clear, spontaneous    Living Environment:   People in home: spouse     Current living Arrangements: apartment      Able to return to prior arrangements: yes       Family/Social Support:  Care provided by: self  Provides care for: no one  Marital Status:   Support system: Wife, Children          Description of Support System: Supportive, Involved         Current Resources:   Patient receiving home care services: No        Community Resources: Other (see comment) (chemo)  Equipment currently used at home: none  Supplies currently used at home: None    Employment/Financial:  Employment Status: retired        Financial Concerns: none   Referral to Financial Worker: No       Does the patient's insurance plan have a 3 day qualifying hospital stay waiver?  No    Lifestyle & Psychosocial Needs:  Social Drivers of Health     Food Insecurity: Low Risk  (7/28/2025)    Food Insecurity     Within the past 12 months, did you worry that your food would run out before you got money to buy more?: No     Within the past 12 months, did the food you bought  just not last and you didn t have money to get more?: No   Depression: Not at risk (9/25/2024)    [facility]    PHQ-2     PHQ-2 TOTAL SCORE: 0   Housing Stability: Low Risk  (7/28/2025)    Housing Stability     Do you have housing? : Yes     Are you worried about losing your housing?: No   Tobacco Use: High Risk (7/28/2025)    Patient History     Smoking Tobacco Use: Light Smoker     Smokeless Tobacco Use: Unknown     Passive Exposure: Not on file   Financial Resource Strain: Low Risk  (7/28/2025)    Financial Resource Strain     Within the past 12 months, have you or your family members you live with been unable to get utilities (heat, electricity) when it was really needed?: No   Alcohol Use: Not on file   Transportation Needs: Low Risk  (7/28/2025)    Transportation Needs     Within the past 12 months, has lack of transportation kept you from medical appointments, getting your medicines, non-medical meetings or appointments, work, or from getting things that you need?: No   Physical Activity: Not on file   Interpersonal Safety: Low Risk  (7/28/2025)    Interpersonal Safety     Do you feel physically and emotionally safe where you currently live?: Yes     Within the past 12 months, have you been hit, slapped, kicked or otherwise physically hurt by someone?: No     Within the past 12 months, have you been humiliated or emotionally abused in other ways by your partner or ex-partner?: No   Stress: Not on file   Social Connections: Socially Integrated (5/8/2025)    [facility]    Social Connections     Do you often feel lonely or isolated from those around you?: 0   Health Literacy: Not on file       Functional Status:  Prior to admission patient needed assistance:   Dependent ADLs:: Independent  Dependent IADLs:: Independent       Mental Health Status:  Mental Health Status: No Current Concerns       Chemical Dependency  Status:  Chemical Dependency Status: No Current Concerns             Values/Beliefs:  Spiritual, Cultural Beliefs, Quaker Practices, Values that affect care: no               Discussed  Partnership in Safe Discharge Planning  document with patient/family: No    Additional Information:  Writer met with patient, wife-Abena, and children Solis and Kenrick.  Writer introduced self and role.  Patient lives with Abena in an independent apartment with an elevator.  Prior to admit, patient was independent with all A/IADLs.  Abena states that over the past couple months the patient has lost about 50 lbs and has been getting increasingly weaker.  Abena expressed concern regarding patient discharging home and being safe since she works about 30 hrs per week.  Writer explained that therapy would continue to work with patient and make recommendations for discharge.  It is likely that patient will continue to progress and be able to discharge home with homecare vs OP Cardiac Rehab.  Patient expressed that he was willing to have homecare but did not want to go to TCU.      Next Steps: Care coordination team will continue to follow and assist with a safe discharge plan.     Dina Rivera RN Care Coordinator  [facility]  [phone]

## 2025-07-29 NOTE — PLAN OF CARE
VSS, frequent PVCs and occasional V runs-strips in chart, asymptomatic. Q6 hgb checks, 8.7 after 1 unit PRBCs this morning. Smear of stool was black, expected after known bleed yesterday. Up in chair with some orthostatic BP changes but was asymptomatic. Stayed up in chair for approx 2 hours without hypotension. In the afternoon was able to walk in the love with SBA, walker. Port Hep locked, PIV SL. Family very involved with cares. Low appetite, mechanical soft diet. Family plans to bring some favorite foods.

## 2025-07-29 NOTE — PLAN OF CARE
Goal Outcome Evaluation:      Plan of Care Reviewed With: patient, spouse, child    Overall Patient Progress: improvingOverall Patient Progress: improving    Outcome Evaluation: discharge home with homecare likely.

## 2025-07-29 NOTE — SIGNIFICANT EVENT
Significant Event Note    Time of event: 5:32 AM July 29, 2025    Description of event:  Hgb trending down, currently 7.4. recent STEMI with JEROME placed on 7/26. GI following for GI bleed.     Plan:  With recent STEMI would benefit from higher hgb, changed conditional transfusion threshold to 8.     Discussed with: bedside nurse    Dejan Amaya, DO

## 2025-07-29 NOTE — PLAN OF CARE
Neuro: A&Ox4  Tele/Cardiac: SR w/ PVCs  Resp: 2L NC, MARTIN  Activity: bedrest  Pain: denies  Drips/IV: sodium phosphate infusing, blood transfusing  GI/: WDL  Skin: scattered bruising on arms  Diet: Diet: Low Saturated Fat Na <2400 mg     Test/Procedures: n/a  Plan: plan of care ongoing. Cardiology following.

## 2025-07-29 NOTE — PROGRESS NOTES
[facility]    Medicine Progress Note - Hospitalist Service    Date of Admission:  7/26/2025  Date of Service: 07/29/2025    Assessment & Plan     Aydin Blanc is a 80 year old male with a history of MI, CAD s/p stent placement, DVT, PE anticoagulated with Eliquis, NSVT, and esophageal adenocarcinoma who presents to the ED via EMS with chest pressure with STEMI in the inferior leads with EMS.  Patient was taken to cath lab and noted to have 99% mid RCA disease that is heavily calcified.  He underwent PCI with 2 stent placement.  He was started on Brillinta and did not receive asa due to allergies.    # Upper GIB suspected 2/2 bleeding esophageal tumor   # Esophageal carcinoma, currently undergoing chemotherapy (last infusion 7/25)  # Acute blood loss anemia due to above  Assessment: Developed hematemesis evening of 7/27/25 and transferred to ICU 07/28. Resuscitated with 1 unit PRBCs and 2L crystalloid. Briefly required vasopressors, but has been weaned off.  Baseline Hb 11/13 range. Dropped to 7.6 on 7/28.  EGD 7/28/25 --> clotted blood in the lower 1/3rd of the esophagus, cardia, and gastric fundus suspected from esophageal cancer. No active bleeding visualized under clotting. Unfortunately, his esophageal cancer is the source of his bleeding, this may be a difficult situation to control.   Plan:  - GI following, appreciate recs (signed off 7/28, will need to discuss DOAC):  IF recurrent bleeding, consult IR   Discuss acx/anti-plt therapy resumption as above --> Ok to resume brillinta 7/28, but continue to hold apixaban at this time.   - CTA chest/abd/pelvis 7/28 --> No active bleeding identified   7/29: Hb 7.4. No further hematemesis. Monitor Hb q8h, transfuse for Hb <8. The timing of Eliquis resumption will depend on hemoglobin trends and further input from gastroenterology.   - PPI IV BID continued  - daily CBC  - Diet: Ok for diet --> cardiac diet ordered    ## Acute inferior  "wall STEMI  # HLD  Patient  presented with chest pain and EKG changes initial troponin 22 with suspected inferior wall MI.  He underwent emergent LHC with mid RCA disease at 99% heavily calcified and underwent JEROME x 2.  Held Eliquis and Brilinta resumed for 12 months  ASA not given in setting of allergies  TTE -> Left ventricular systolic function is low normal, ejection fraction is 50-55%.  Continue telemetry monitoring  metoprolol succinate 25 mg daily   Increased atorvastatin to 80 mg daily (goal LDL <55)     #Hypophosphatemia  Replace per protocol    ## Hx of DVT/PE  Holding Eliquis at this time due to GIB    ## Hx of Esophageal Cancer  Patient is followed by [facility] and recently underwent chemotherapy infusion day of admission.   Will need close f/u with Oncology at discharge    Diet: cardiac diet  DVT Prophylaxis: Pneumatic Compression Devices  Rae Catheter: Not present  Lines: PRESENT                Diet: Low Saturated Fat Na <2400 mg    DVT Prophylaxis: Pneumatic Compression Devices  Rae Catheter: Not present  Lines: PRESENT      Port a Cath 07/26/25 Right Chest wall-Site Assessment: WDL      Cardiac Monitoring: ACTIVE order. Indication: [facility] [place]  Code Status: Full Code      Clinically Significant Risk Factors               # Hypoalbuminemia: Lowest albumin = 2.5 g/dL at 7/29/2025  4:09 AM, will monitor as appropriate  # Coagulation Defect: INR = 1.18 (Ref range: 0.85 - 1.15) and/or PTT = 30 Seconds (Ref range: 22 - 38 Seconds), will monitor for bleeding               # Overweight: Estimated body mass index is 29.2 kg/m  as calculated from the following:    Height as of this encounter: 1.74 m (5' 8.5\").    Weight as of this encounter: 88.4 kg (194 lb 14.2 oz)., PRESENT ON ADMISSION            Social Drivers of Health    Tobacco Use: High Risk (7/28/2025)    Patient History     Smoking Tobacco Use: Light Smoker     Smokeless Tobacco Use: Unknown          Disposition Plan     Medically Ready for " Discharge: Anticipated in 2-4 Days, likely home with home cares             Diana Mason MD  Hospitalist Service  [facility]  Securely message with [employer] (more info)  Text page via [employer] Paging/Directory   ______________________________________________________________________    Interval History     Patient was seen in CCU.  He was alert and laying in bed.  States he is feeling better today.  Denies any pain.  Has not had any further hematemesis.  Last bowel movement was yesterday.  No fevers or shortness of breath.    Physical Exam   Vital Signs: Temp: 97.9  F (36.6  C) Temp src: Oral BP: 109/66 Pulse: 68   Resp: 20 SpO2: 100 % O2 Device: Nasal cannula Oxygen Delivery: 2 LPM  Weight: 194 lbs 14.19 oz    Constitutional: alert, cooperative, no distress  Respiratory: Nonlabored breathing, clear anteriorly  Heart: RRR, no significant edema  Abdomen: Soft, no significant tenderness, bowel sounds heard  Skin: No concerning lesions  Psych: Alert and pleasant, mood and affect appropriate  Neuro: Moving all extremities, speech is fluent, alert and oriented    ----------------------------------------------------------------------------------------    Medical Decision Making       50 MINUTES SPENT BY ME on the date of service doing chart review, history, exam, documentation & further activities per the note.      Data   ------------------------- PAST 24 HR DATA REVIEWED -----------------------------------------------    I have personally reviewed the following data over the past 24 hrs:    N/A  \   7.4 (L)   / N/A     137 105 45.6 (H) /  100 (H)   4.0 23 0.82 \     ALT: 25 AST: 21 AP: 148 TBILI: 0.4   ALB: 2.5 (L) TOT PROTEIN: 4.6 (L) LIPASE: N/A     Procal: N/A CRP: N/A Lactic Acid: 1.5         Imaging results reviewed over the past 24 hrs:   Recent Results (from the past 24 hours)   CTA CHEST ABDOMEN PELVIS WITH CONTRAST PANEL    Narrative    EXAM: CTA CHEST ABDOMEN PELVIS W CONTRAST  LOCATION:  [facility]  DATE: 7/28/2025    INDICATION: Upper GIB, suspected 2 2 esophageal cancer with clot noted in distal esophagus past stent. Evaluate for active bleeding  COMPARISON: 7/14/025  TECHNIQUE: CT angiogram chest abdomen pelvis during arterial phase of injection of IV contrast. 2D and 3D MIP reconstructions were performed by the CT technologist. Dose reduction techniques were used.   CONTRAST: 119mL isovue 370    FINDINGS:   CT ANGIOGRAM CHEST, ABDOMEN, AND PELVIS: Redemonstration of an enhancing mass in the gastroesophageal junction, consistent with known malignancy. Unchanged position of the stent at the gastric esophageal junction. No evidence of active contrast   extravasation at the distal aspect of the stent. No active gastrointestinal hemorrhage.    Mildly dilated ascending thoracic aorta measuring 4.1 cm. No aortic aneurysm or dissection. The celiac artery, superior mesenteric artery, bilateral renal arteries, and inferior mesenteric arteries are patent without significant stenosis. Moderate amount   of calcified atheromatous plaque in the iliac arteries without significant focal stenosis in the common iliac, internal iliac and external iliac arteries.    LUNGS AND PLEURA: Mild atelectasis in the lung bases. Mild emphysema. No infiltrate or pleural effusion. Punctate calcified granuloma in the right middle lobe is stable. No new or enlarging nodules.    MEDIASTINUM/AXILLAE: Stable thyroid nodules. Right IJ port catheter. No filling defects in the central pulmonary arteries. The small filling defect in a right lower lobe segmental pulmonary arteries seen on the prior chest CT is not well seen today due   to timing of the contrast bolus. GE junction mass as discussed above, similar in appearance to 4/17/2025. Unchanged position of the distal esophageal stent.    CORONARY ARTERY CALCIFICATION: Severe.    HEPATOBILIARY: Redemonstration of numerous hepatic metastases, stable since  4/17/2025 represent the lesions include a 3.1 cm lesion in the caudate lobe (series 9, image 113), previously measuring 3.0 cm and a 2.8 cm lesion in the central liver,   previously measuring 2.7 cm (9/105). Stable intrahepatic biliary ductal dilatation in the left hepatic lobe with likely due to obstruction by the central liver metastasis. Stable tumor thrombus occluding the left hepatic vein. Cholelithiasis.    PANCREAS: Punctate pancreatic parenchymal calcifications. No peripancreatic inflammatory changes or ductal dilatation.    SPLEEN: Normal.    ADRENAL GLANDS: Normal.    KIDNEYS/BLADDER: Stable solid enhancing masses in the kidneys bilaterally, with the largest enhancing mass in the right lobe measuring 2.1 cm (series 9, image 178) and in the left kidney measuring 0.9 cm. Additional bilateral renal cysts are also stable.   No calculi or hydronephrosis.    BOWEL: Large amount of formed stool in the rectum. Colonic diverticulosis. No small bowel or colonic obstruction or inflammatory changes. See discussion above regarding the GE junction mass. Normal appendix.    LYMPH NODES: Stable upper abdominal lymphadenopathy. For example, a cluster of enlarged gastrohepatic measuring 5.1 x 3.8 cm previously measured 5.3 x 3.3 cm, unchanged (9/118). No new or enlarging lymph nodes.    PELVIC ORGANS: No pelvic masses. Stable fat-containing small left inguinal hernia. No ascites.    MUSCULOSKELETAL: Degenerative changes in the spine. No suspicious lesions in the bones.      Impression    IMPRESSION:  1.  No active gastrointestinal hemorrhage.  2.  Stable appearance of the known GE junction mass with a stent in place.  3.  Stable hepatic metastases and upper abdominal lymphadenopathy.  4.  Stable intrahepatic biliary ductal dilatation in the left hepatic lobe, likely due to obstruction by the central hepatic metastasis. Table tumor thrombus in the left hepatic vein.  5.  Stable enhancing bilateral renal masses, consistent  with solid renal neoplasms such as oncocytoma or renal cell carcinomas.  6.  The small right lower lobe segmental pulmonary embolus seen on the prior chest CT is not well seen today due to timing of the contrast bolus.         ------------------------- ENCOUNTER LABS ----------------------------------------------------------------  Recent Labs   Lab 07/29/25  0409 07/28/25  2149 07/28/25  1555 07/28/25  1015 07/28/25  0605 07/28/25  0125 07/26/25  2106 07/26/25  1805   WBC  --   --   --   --   --  14.5*  --  9.8   HGB 7.4* 7.8* 8.7*   < > 9.1* 7.6*  --  11.1*   MCV 82 81 81   < > 82 86  --  85   PLT  --   --   --   --   --  364  --  312   INR  --   --   --   --  1.18*  --   --  1.21*     --   --   --  136 136   < > 136   POTASSIUM 4.0  --   --   --  4.1 4.4   < > 4.5   CHLORIDE 105  --   --   --  105 102   < > 99   CO2 23  --   --   --  20* 22   < > 21*   BUN 45.6*  --   --   --  58.4* 52.8*   < > 26.4*   CR 0.82  --   --   --  0.87 0.83   < > 0.77   ANIONGAP 9  --   --   --  11 12   < > 16*   GAGE 8.1*  --   --   --  8.9 8.7*   < > 9.9   *  --   --   --  144* 111*   < > 168*   ALBUMIN 2.5*  --   --   --   --   --   --   --    PROTTOTAL 4.6*  --   --   --   --   --   --   --    BILITOTAL 0.4  --   --   --   --   --   --   --    ALKPHOS 148  --   --   --   --   --   --   --    ALT 25  --   --   --   --   --   --   --    AST 21  --   --   --   --   --   --   --     < > = values in this interval not displayed.       Most Recent 3 CBC's:  Recent Labs   Lab Test 07/29/25  0409 07/28/25  2149 07/28/25  1555 07/28/25  0605 07/28/25  0125 07/26/25  1805   WBC  --   --   --   --  14.5* 9.8   HGB 7.4* 7.8* 8.7*   < > 7.6* 11.1*   MCV 82 81 81   < > 86 85   PLT  --   --   --   --  364 312    < > = values in this interval not displayed.     Most Recent 3 BMP's:  Recent Labs   Lab Test 07/29/25 0409 07/28/25  0605 07/28/25  0125    136 136   POTASSIUM 4.0 4.1 4.4   CHLORIDE 105 105 102   CO2 23 20* 22   BUN  45.6* 58.4* 52.8*   CR 0.82 0.87 0.83   ANIONGAP 9 11 12   GAGE 8.1* 8.9 8.7*   * 144* 111*     Most Recent 2 LFT's:  Recent Labs   Lab Test 07/29/25  0409   AST 21   ALT 25   ALKPHOS 148   BILITOTAL 0.4     Most Recent 3 INR's:  Recent Labs   Lab Test 07/28/25  0605 07/26/25  1805   INR 1.18* 1.21*     Most Recent 3 Troponin's:No lab results found.  Most Recent 3 BNP's:  Recent Labs   Lab Test 07/28/25  0125   NTBNP 1,927*     Most Recent D-dimer:No lab results found.  Most Recent Cholesterol Panel:  Recent Labs   Lab Test 07/26/25 2120   CHOL 148   LDL 94   HDL 44   TRIG 49     Most Recent 6 Bacteria Isolates From Any Culture (See EPIC Reports for Culture Details):No lab results found.  Most Recent TSH and T4:  Recent Labs   Lab Test 07/26/25 2120   TSH 0.77     Most Recent Hemoglobin A1c:No lab results found.  Most Recent Urinalysis:No lab results found.  Most Recent ESR & CRP:No lab results found.

## 2025-07-29 NOTE — PROGRESS NOTES
Cardiology Progress Note          Assessment and Plan:         80 year old male with known coronary disease with prior inferior STEMI in 2017 status post RCA stenting, ischemic and nonischemic cardiomyopathy, paroxysmal VT status post ablation, hyperlipidemia, ascending aortic dilatation,  history of DVT and PE, history of tobacco use and esophageal cancer (on chemotherapy) who was admitted on 7/26/2025 with an acute inferior ST elevation myocardial infarction.    He is status post PCI with drug-eluting stent placement x 2 to the proximal/mid right coronary artery.  Echocardiography this admission demonstrated an LVEF of 50 to 55%.      He was transferred to the ICU on 07/28/2025 after experiencing hematemesis with associated hypotension.  EGD demonstrated a clot at the distal stent but no evidence of active bleeding.  All anticoagulant/antiplatelet therapy is on hold.    IMPRESSION:    Acute inferior ST elevation myocardial infarction status post drug-eluting stent placement x 2 to the right coronary artery.  History of DVT/PE.  Upper GI bleed earlier this morning.  Hemoglobin 9.1 this morning from 7.6 at 1:25 AM.  Was 11.12 days ago.  History of esophageal cancer, recently started on chemotherapy.  Moderate dilatation of the ascending thoracic aorta on echocardiography this admission.  History of anaphylaxis with aspirin.    PLAN    - Hemodynamics have stabilized.  Although hemoglobin dropped to 7.4 this morning he has not experienced any further hematemesis.  - Brilinta was started last evening after discussion with gastroenterology.  The timing of Eliquis resumption will depend on hemoglobin trends and further input from gastroenterology.  - Will continue to follow.              Interval History:     Feels well.  No chest discomfort this morning.             Review of Systems:   As per subjective, otherwise 5 systems reviewed and negative.           Physical Exam:   Blood pressure 101/66, pulse 74, temperature  "98  F (36.7  C), temperature source Axillary, resp. rate 13, height 1.74 m (5' 8.5\"), weight 88.5 kg (195 lb 1.7 oz), SpO2 96%.      Vital Sign Ranges  Temperature Temp  Av.9  F (36.6  C)  Min: 97.5  F (36.4  C)  Max: 98.9  F (37.2  C)   Blood pressure Systolic (24hrs), Av , Min:86 , Max:144        Diastolic (24hrs), Av, Min:60, Max:108      Pulse Pulse  Av.3  Min: 52  Max: 107   Respirations Resp  Av.4  Min: 12  Max: 24   Pulse oximetry SpO2  Av.9 %  Min: 85 %  Max: 99 %         Intake/Output Summary (Last 24 hours) at 2025 1552  Last data filed at 2025 1140  Gross per 24 hour   Intake --   Output 2350 ml   Net -2350 ml       Constitutional: NAD  Skin: Warm and dry  Neck: No JVD  Lungs: CTA  Cardiovascular: RRR, no m/r/g  Abdomen: Soft, non tender.  Extremities and Back: No LE edema  Neurological: Nonfocal           Medications:     I have reviewed this patient's current medications.              Data:     Labs reviewed.           Kirby Clay MD, MPENELOPE.    "

## 2025-07-30 ENCOUNTER — APPOINTMENT (OUTPATIENT)
Dept: OCCUPATIONAL THERAPY | Facility: CLINIC | Age: 80
DRG: 323 | End: 2025-07-30
Payer: COMMERCIAL

## 2025-07-30 LAB
ALBUMIN SERPL BCG-MCNC: 2.4 G/DL (ref 3.5–5.2)
ALP SERPL-CCNC: 163 U/L (ref 40–150)
ALT SERPL W P-5'-P-CCNC: 22 U/L (ref 0–70)
ANION GAP SERPL CALCULATED.3IONS-SCNC: 10 MMOL/L (ref 7–15)
AST SERPL W P-5'-P-CCNC: 28 U/L (ref 0–45)
BILIRUB SERPL-MCNC: 0.7 MG/DL
BUN SERPL-MCNC: 30.5 MG/DL (ref 8–23)
CALCIUM SERPL-MCNC: 8.5 MG/DL (ref 8.8–10.4)
CHLORIDE SERPL-SCNC: 106 MMOL/L (ref 98–107)
CREAT SERPL-MCNC: 0.8 MG/DL (ref 0.67–1.17)
EGFRCR SERPLBLD CKD-EPI 2021: 89 ML/MIN/1.73M2
ERYTHROCYTE [DISTWIDTH] IN BLOOD BY AUTOMATED COUNT: 18 % (ref 10–15)
GLUCOSE SERPL-MCNC: 94 MG/DL (ref 70–99)
HCO3 SERPL-SCNC: 22 MMOL/L (ref 22–29)
HCT VFR BLD AUTO: 26.6 % (ref 40–53)
HGB BLD-MCNC: 8.1 G/DL (ref 13.3–17.7)
HGB BLD-MCNC: 8.8 G/DL (ref 13.3–17.7)
HGB BLD-MCNC: 9 G/DL (ref 13.3–17.7)
MAGNESIUM SERPL-MCNC: 2.3 MG/DL (ref 1.7–2.3)
MCH RBC QN AUTO: 26.3 PG (ref 26.5–33)
MCHC RBC AUTO-ENTMCNC: 33.1 G/DL (ref 31.5–36.5)
MCV RBC AUTO: 80 FL (ref 78–100)
MCV RBC AUTO: 81 FL (ref 78–100)
MCV RBC AUTO: 81 FL (ref 78–100)
PHOSPHATE SERPL-MCNC: 1.9 MG/DL (ref 2.5–4.5)
PHOSPHATE SERPL-MCNC: 1.9 MG/DL (ref 2.5–4.5)
PLATELET # BLD AUTO: 239 10E3/UL (ref 150–450)
POTASSIUM SERPL-SCNC: 3.8 MMOL/L (ref 3.4–5.3)
PROT SERPL-MCNC: 4.9 G/DL (ref 6.4–8.3)
RBC # BLD AUTO: 3.34 10E6/UL (ref 4.4–5.9)
SODIUM SERPL-SCNC: 138 MMOL/L (ref 135–145)
WBC # BLD AUTO: 9 10E3/UL (ref 4–11)

## 2025-07-30 PROCEDURE — 258N000003 HC RX IP 258 OP 636: Performed by: HOSPITALIST

## 2025-07-30 PROCEDURE — 250N000013 HC RX MED GY IP 250 OP 250 PS 637: Performed by: INTERNAL MEDICINE

## 2025-07-30 PROCEDURE — 99233 SBSQ HOSP IP/OBS HIGH 50: CPT | Performed by: HOSPITALIST

## 2025-07-30 PROCEDURE — 97110 THERAPEUTIC EXERCISES: CPT | Mod: GO | Performed by: OCCUPATIONAL THERAPIST

## 2025-07-30 PROCEDURE — 85018 HEMOGLOBIN: CPT | Performed by: HOSPITALIST

## 2025-07-30 PROCEDURE — 80053 COMPREHEN METABOLIC PANEL: CPT | Performed by: NURSE PRACTITIONER

## 2025-07-30 PROCEDURE — 210N000002 HC R&B HEART CARE

## 2025-07-30 PROCEDURE — 250N000013 HC RX MED GY IP 250 OP 250 PS 637: Performed by: NURSE PRACTITIONER

## 2025-07-30 PROCEDURE — 99233 SBSQ HOSP IP/OBS HIGH 50: CPT | Performed by: INTERNAL MEDICINE

## 2025-07-30 PROCEDURE — 84100 ASSAY OF PHOSPHORUS: CPT | Performed by: INTERNAL MEDICINE

## 2025-07-30 PROCEDURE — 85014 HEMATOCRIT: CPT | Performed by: HOSPITALIST

## 2025-07-30 PROCEDURE — 250N000011 HC RX IP 250 OP 636: Performed by: INTERNAL MEDICINE

## 2025-07-30 PROCEDURE — 83735 ASSAY OF MAGNESIUM: CPT | Performed by: INTERNAL MEDICINE

## 2025-07-30 PROCEDURE — 250N000009 HC RX 250: Performed by: HOSPITALIST

## 2025-07-30 PROCEDURE — 250N000013 HC RX MED GY IP 250 OP 250 PS 637: Performed by: HOSPITALIST

## 2025-07-30 RX ORDER — MENTHOL AND METHYL SALICYLATE 7.6; 29 G/100G; G/100G
OINTMENT TOPICAL EVERY 6 HOURS PRN
Status: DISPENSED | OUTPATIENT
Start: 2025-07-30

## 2025-07-30 RX ORDER — METHOCARBAMOL 500 MG/1
500 TABLET, FILM COATED ORAL 3 TIMES DAILY PRN
Status: ACTIVE | OUTPATIENT
Start: 2025-07-30

## 2025-07-30 RX ORDER — POTASSIUM CHLORIDE 1500 MG/1
20 TABLET, EXTENDED RELEASE ORAL ONCE
Status: COMPLETED | OUTPATIENT
Start: 2025-07-30 | End: 2025-07-30

## 2025-07-30 RX ADMIN — Medication 5 ML: at 05:32

## 2025-07-30 RX ADMIN — ATORVASTATIN CALCIUM 80 MG: 80 TABLET, FILM COATED ORAL at 20:07

## 2025-07-30 RX ADMIN — PANTOPRAZOLE SODIUM 40 MG: 40 INJECTION, POWDER, FOR SOLUTION INTRAVENOUS at 17:34

## 2025-07-30 RX ADMIN — MENTHOL AND METHYL SALICYLATE: 7.6; 29 OINTMENT TOPICAL at 12:06

## 2025-07-30 RX ADMIN — TICAGRELOR 90 MG: 90 TABLET, FILM COATED ORAL at 20:07

## 2025-07-30 RX ADMIN — POTASSIUM CHLORIDE 20 MEQ: 1500 TABLET, EXTENDED RELEASE ORAL at 08:33

## 2025-07-30 RX ADMIN — SODIUM PHOSPHATE, MONOBASIC, MONOHYDRATE AND SODIUM PHOSPHATE, DIBASIC, ANHYDROUS 9 MMOL: 142; 276 INJECTION, SOLUTION INTRAVENOUS at 23:01

## 2025-07-30 RX ADMIN — SODIUM PHOSPHATE, MONOBASIC, MONOHYDRATE AND SODIUM PHOSPHATE, DIBASIC, ANHYDROUS 9 MMOL: 142; 276 INJECTION, SOLUTION INTRAVENOUS at 20:08

## 2025-07-30 RX ADMIN — TICAGRELOR 90 MG: 90 TABLET, FILM COATED ORAL at 08:33

## 2025-07-30 RX ADMIN — Medication 5 ML: at 14:38

## 2025-07-30 RX ADMIN — POTASSIUM & SODIUM PHOSPHATES POWDER PACK 280-160-250 MG 2 PACKET: 280-160-250 PACK at 08:33

## 2025-07-30 RX ADMIN — PANTOPRAZOLE SODIUM 40 MG: 40 INJECTION, POWDER, FOR SOLUTION INTRAVENOUS at 05:32

## 2025-07-30 RX ADMIN — POTASSIUM & SODIUM PHOSPHATES POWDER PACK 280-160-250 MG 2 PACKET: 280-160-250 PACK at 12:06

## 2025-07-30 ASSESSMENT — ACTIVITIES OF DAILY LIVING (ADL)
ADLS_ACUITY_SCORE: 42
ADLS_ACUITY_SCORE: 43
ADLS_ACUITY_SCORE: 43
ADLS_ACUITY_SCORE: 42
ADLS_ACUITY_SCORE: 43
ADLS_ACUITY_SCORE: 42
ADLS_ACUITY_SCORE: 39
ADLS_ACUITY_SCORE: 42
ADLS_ACUITY_SCORE: 43
ADLS_ACUITY_SCORE: 42
ADLS_ACUITY_SCORE: 43
ADLS_ACUITY_SCORE: 42

## 2025-07-30 NOTE — PROGRESS NOTES
Cardiology Progress Note          Assessment and Plan:         80 year old male with known coronary disease with prior inferior STEMI in 2017 status post RCA stenting, ischemic and nonischemic cardiomyopathy, paroxysmal VT status post ablation, hyperlipidemia, ascending aortic dilatation,  history of DVT and PE, history of tobacco use and esophageal cancer (on chemotherapy) who was admitted on 7/26/2025 with an acute inferior ST elevation myocardial infarction.    He is status post PCI with drug-eluting stent placement x 2 to the proximal/mid right coronary artery.  Echocardiography this admission demonstrated an LVEF of 50 to 55%.      He was transferred to the ICU on 07/28/2025 after experiencing hematemesis with associated hypotension.  EGD demonstrated a clot at the distal stent but no evidence of active bleeding.  Brilinta was on hold but restarted after 24 hours.  Eliquis is still on hold given ongoing issues with anemia.    IMPRESSION:    Acute inferior ST elevation myocardial infarction status post drug-eluting stent placement x 2 to the right coronary artery.  History of DVT/PE.  Upper GI bleed with EGD findings as above.  Hemoglobin trended downwards yesterday requiring PRBC transfusion x 2.   History of esophageal cancer, recently started on chemotherapy.  Moderate dilatation of the ascending thoracic aorta on echocardiography this admission.  History of anaphylaxis with aspirin.    PLAN    - Hemoglobin 8.8 this morning.  Follow-up hemoglobin will be drawn later on this afternoon.  With targeting a hemoglobin of greater than 8.  If there is continued downtrending then IR consultation may be appropriate.    - Brilinta has been restarted after discussion with gastroenterology.  If recurrent anemia precludes resumption of Eliquis temporary IVC filter implantation could be considered.  Will defer to hospitalist team.    -Would restart low-dose oral if blood pressures remain normotensive to hypertensive.        "   Interval History:     Feels well.  No chest discomfort this morning.  Had a cramping sensation in his left chest that was transient but no symptoms with ambulation.             Review of Systems:   As per subjective, otherwise 5 systems reviewed and negative.           Physical Exam:   Blood pressure (!) 141/89, pulse 64, temperature 97.9  F (36.6  C), temperature source Oral, resp. rate 18, height 1.74 m (5' 8.5\"), weight 89 kg (196 lb 1.6 oz), SpO2 98%.      Vital Sign Ranges  Temperature Temp  Av.9  F (36.6  C)  Min: 97.5  F (36.4  C)  Max: 98.9  F (37.2  C)   Blood pressure Systolic (24hrs), Av , Min:86 , Max:144        Diastolic (24hrs), Av, Min:60, Max:108      Pulse Pulse  Av.3  Min: 52  Max: 107   Respirations Resp  Av.4  Min: 12  Max: 24   Pulse oximetry SpO2  Av.9 %  Min: 85 %  Max: 99 %         Intake/Output Summary (Last 24 hours) at 2025 1552  Last data filed at 2025 1140  Gross per 24 hour   Intake --   Output 2350 ml   Net -2350 ml       Constitutional: NAD  Skin: Warm and dry  Neck: No JVD  Lungs: CTA  Cardiovascular: RRR, no m/r/g  Abdomen: Soft, non tender.  Extremities and Back: No LE edema  Neurological: Nonfocal           Medications:     I have reviewed this patient's current medications.              Data:     Labs reviewed.           Kirby Clay MD, MPENELOPE.    "

## 2025-07-30 NOTE — PLAN OF CARE
Neuro: A&Ox4  Tele/Cardiac: SR  Resp: 2L NC  Activity: SBA  Pain: denies  Drips/IV: port heparin locked   GI/: WDL  Skin: scattered bruising   Diet: Diet: Mechanical/Dental Soft Diet     Test/Procedures: n/a  Plan: plan of care ongoing.

## 2025-07-30 NOTE — PLAN OF CARE
Orientations: A&O x4  Vitals/Pain: VSS on RA, denies pain  Tele: SR w/PVCs  Lines/Drains: IV SL  Skin/Wounds: intact  GI/: continent  Labs: Abnormal/Trends, Electrolyte Replacement- Hgb 7.8 this evening, 1 unit transfusing  Ambulation/Assist: SBA  Plan: continue Q6H Hgb checks

## 2025-07-30 NOTE — PLAN OF CARE
VSS, a few short runs of VT, asymptomatic. Frequent PVCs. L knee pain treated with icy hot effectively. Up with SBA. Alert/oriented and in good spirits. Better appetite today. HGB has stabilized. 2 large dark brown diarrheas today after PO phos given for protocol. Change to IV protocol if needed after recheck level. Family very involved in care.

## 2025-07-30 NOTE — CONSULTS
"CLINICAL NUTRITION SERVICES - ASSESSMENT NOTE    Registered Dietitian Interventions:  Consume small meals with a protein at every meal and snack  Use protein powder in baked goods and soups  Rd to send vanilla Ensure HP at AM/PM snack (Each provides  350 kcal and 20 g protein)      REASON FOR ASSESSMENT  RN Consult: Very low appetite, only eating pudding and juice. Family is very concerned about quality nutrition. Would love info and options for while he is here     INFORMATION OBTAINED  Assessed patient in room.    NUTRITION HISTORY  Met with pt and pt's family. Sine his dx of cancer he has lost a lot of weight and has had a hard time finding foods he can eat with his stent. His wife does the cooking so we discussed ways to add protein and calories to foods including unflavored protein powders, peanut butter, peanut butter powder, cottage cheese, etc. Encouraged small frequent meals with a protein at every meal. If appetite is low consider drinking a nutrition supplement. Provided handouts on Six Small Meals and ways to add protein to meals and snacks.      CURRENT NUTRITION ORDERS  Diet: Mechanical/Dental Soft  Snacks/Supplements: None      CURRENT INTAKE/TOLERANCE  0% of all documented meals, appetite noted as fair  0-2 meals ordered per day, likely not meeting estimated needs    LABS  Nutrition-relevant labs: BUN 30.5, Phos 1.9, Alk Phos 163, Hgb 8.8    MEDICATIONS  Nutrition-relevant medications: protonix, KCl,     ANTHROPOMETRICS  Height: 174 cm (5' 8.504\")  Admission Weight: 89.8 kg (198 lb) (07/26/25 1812)   Most Recent Weight: 89 kg (196 lb 1.6 oz) (07/30/25 0528)  IBW: 69.9 kg (129% or IBW)  BMI: Body mass index is 29.38 kg/m .   Weight History:   Wt Readings from Last 10 Encounters:   07/30/25 89 kg (196 lb 1.6 oz)   01/31/17 101.6 kg (224 lb)      7/30/25  89 kg (196 lb 1.6 oz) Standing scale   07/29/25  88.4 kg (194 lb 14.2 oz) Bed scale   07/28/25  88.5 kg (195 lb 1.7 oz) --   07/28/25  88.5 kg (195 lb " 1.7 oz) Bed scale   07/27/25  87.8 kg (193 lb 8 oz) Standing scale   07/26/25  89.8 kg (198 lb)    - Continue to trend weights    Dosing Weight: 69.9 kg, based on ideal wt    ASSESSED NUTRITION NEEDS  Estimated Energy Needs:  7256-1870 kcals/day (25 - 30 kcals/kg)  Justification:  Estimated Protein Needs:  grams protein/day (1.2 - 1.5 grams of pro/kg)  Justification: Post-op and Repletion  Estimated Fluid Needs: 2865-4389 mL/day (1 mL/kcal) or total body fluids per MD  Justification: Maintenance    SYSTEM AND PHYSICAL FINDINGS       GI symptoms: Reviewed  Skin/wounds: Reviewed    MALNUTRITION  % Intake: < 75% for >/= 1 month (moderate)  % Weight Loss: Unable to assess   Subcutaneous Fat Loss: None observed  Muscle Loss: None observed  Fluid Accumulation/Edema: None noted  Malnutrition Diagnosis: Patient does not meet two of the established criteria necessary for diagnosing malnutrition but is at risk for malnutrition  Malnutrition Present on Admission: No    NUTRITION DIAGNOSIS  Predicted inadequate nutrient intake protein-energy related to cancer as evidenced by < 75% for >/= 1 month (moderate) and family reports of poor intake.    INTERVENTIONS  Medical food supplement therapy  See nutrition interventions above    GOALS  Patient to consume % of nutritionally adequate meal trays TID, or the equivalent with supplements/snacks.     MONITORING/EVALUATION  Progress toward goals will be monitored and evaluated per policy.    Janet Matson, PhD, RD  Clinical Dietitian - [facility]

## 2025-07-31 ENCOUNTER — APPOINTMENT (OUTPATIENT)
Dept: OCCUPATIONAL THERAPY | Facility: CLINIC | Age: 80
DRG: 323 | End: 2025-07-31
Payer: COMMERCIAL

## 2025-07-31 VITALS
DIASTOLIC BLOOD PRESSURE: 74 MMHG | RESPIRATION RATE: 18 BRPM | BODY MASS INDEX: 28.97 KG/M2 | OXYGEN SATURATION: 95 % | WEIGHT: 195.6 LBS | TEMPERATURE: 97.6 F | HEIGHT: 69 IN | HEART RATE: 71 BPM | SYSTOLIC BLOOD PRESSURE: 137 MMHG

## 2025-07-31 LAB
ALBUMIN SERPL BCG-MCNC: 2.6 G/DL (ref 3.5–5.2)
ALP SERPL-CCNC: 176 U/L (ref 40–150)
ALT SERPL W P-5'-P-CCNC: 22 U/L (ref 0–70)
ANION GAP SERPL CALCULATED.3IONS-SCNC: 11 MMOL/L (ref 7–15)
AST SERPL W P-5'-P-CCNC: 25 U/L (ref 0–45)
BILIRUB SERPL-MCNC: 0.6 MG/DL
BUN SERPL-MCNC: 18.9 MG/DL (ref 8–23)
CALCIUM SERPL-MCNC: 8.1 MG/DL (ref 8.8–10.4)
CHLORIDE SERPL-SCNC: 103 MMOL/L (ref 98–107)
CREAT SERPL-MCNC: 0.76 MG/DL (ref 0.67–1.17)
EGFRCR SERPLBLD CKD-EPI 2021: 90 ML/MIN/1.73M2
GLUCOSE SERPL-MCNC: 96 MG/DL (ref 70–99)
HCO3 SERPL-SCNC: 22 MMOL/L (ref 22–29)
HGB BLD-MCNC: 8.4 G/DL (ref 13.3–17.7)
HGB BLD-MCNC: 9.3 G/DL (ref 13.3–17.7)
MAGNESIUM SERPL-MCNC: 2 MG/DL (ref 1.7–2.3)
MCV RBC AUTO: 81 FL (ref 78–100)
MCV RBC AUTO: 81 FL (ref 78–100)
PHOSPHATE SERPL-MCNC: 2.6 MG/DL (ref 2.5–4.5)
POTASSIUM SERPL-SCNC: 3.5 MMOL/L (ref 3.4–5.3)
PROT SERPL-MCNC: 5 G/DL (ref 6.4–8.3)
SODIUM SERPL-SCNC: 136 MMOL/L (ref 135–145)

## 2025-07-31 PROCEDURE — 82435 ASSAY OF BLOOD CHLORIDE: CPT | Performed by: NURSE PRACTITIONER

## 2025-07-31 PROCEDURE — 97110 THERAPEUTIC EXERCISES: CPT | Mod: GO

## 2025-07-31 PROCEDURE — 250N000009 HC RX 250: Performed by: HOSPITALIST

## 2025-07-31 PROCEDURE — 250N000011 HC RX IP 250 OP 636: Performed by: INTERNAL MEDICINE

## 2025-07-31 PROCEDURE — 99233 SBSQ HOSP IP/OBS HIGH 50: CPT | Performed by: INTERNAL MEDICINE

## 2025-07-31 PROCEDURE — 85018 HEMOGLOBIN: CPT | Performed by: HOSPITALIST

## 2025-07-31 PROCEDURE — 99232 SBSQ HOSP IP/OBS MODERATE 35: CPT | Performed by: STUDENT IN AN ORGANIZED HEALTH CARE EDUCATION/TRAINING PROGRAM

## 2025-07-31 PROCEDURE — 97110 THERAPEUTIC EXERCISES: CPT | Mod: GO | Performed by: OCCUPATIONAL THERAPIST

## 2025-07-31 PROCEDURE — 97535 SELF CARE MNGMENT TRAINING: CPT | Mod: GO

## 2025-07-31 PROCEDURE — 258N000003 HC RX IP 258 OP 636: Performed by: HOSPITALIST

## 2025-07-31 PROCEDURE — 250N000013 HC RX MED GY IP 250 OP 250 PS 637: Performed by: NURSE PRACTITIONER

## 2025-07-31 PROCEDURE — 84100 ASSAY OF PHOSPHORUS: CPT | Performed by: INTERNAL MEDICINE

## 2025-07-31 PROCEDURE — 97530 THERAPEUTIC ACTIVITIES: CPT | Mod: GO | Performed by: OCCUPATIONAL THERAPIST

## 2025-07-31 PROCEDURE — 82947 ASSAY GLUCOSE BLOOD QUANT: CPT | Performed by: NURSE PRACTITIONER

## 2025-07-31 PROCEDURE — 83735 ASSAY OF MAGNESIUM: CPT | Performed by: INTERNAL MEDICINE

## 2025-07-31 PROCEDURE — 120N000001 HC R&B MED SURG/OB

## 2025-07-31 PROCEDURE — 85018 HEMOGLOBIN: CPT | Performed by: STUDENT IN AN ORGANIZED HEALTH CARE EDUCATION/TRAINING PROGRAM

## 2025-07-31 PROCEDURE — 250N000011 HC RX IP 250 OP 636: Performed by: HOSPITALIST

## 2025-07-31 PROCEDURE — 250N000013 HC RX MED GY IP 250 OP 250 PS 637: Performed by: HOSPITALIST

## 2025-07-31 RX ORDER — MAGNESIUM SULFATE HEPTAHYDRATE 40 MG/ML
2 INJECTION, SOLUTION INTRAVENOUS ONCE
Status: COMPLETED | OUTPATIENT
Start: 2025-07-31 | End: 2025-07-31

## 2025-07-31 RX ORDER — POTASSIUM CHLORIDE 1500 MG/1
20 TABLET, EXTENDED RELEASE ORAL ONCE
Status: COMPLETED | OUTPATIENT
Start: 2025-07-31 | End: 2025-07-31

## 2025-07-31 RX ADMIN — Medication 5 ML: at 16:42

## 2025-07-31 RX ADMIN — PANTOPRAZOLE SODIUM 40 MG: 40 INJECTION, POWDER, FOR SOLUTION INTRAVENOUS at 16:38

## 2025-07-31 RX ADMIN — ATORVASTATIN CALCIUM 80 MG: 80 TABLET, FILM COATED ORAL at 19:41

## 2025-07-31 RX ADMIN — MAGNESIUM SULFATE HEPTAHYDRATE 2 G: 40 INJECTION, SOLUTION INTRAVENOUS at 06:07

## 2025-07-31 RX ADMIN — TICAGRELOR 90 MG: 90 TABLET, FILM COATED ORAL at 19:41

## 2025-07-31 RX ADMIN — Medication 5 ML: at 13:16

## 2025-07-31 RX ADMIN — SODIUM PHOSPHATE, MONOBASIC, MONOHYDRATE AND SODIUM PHOSPHATE, DIBASIC, ANHYDROUS 9 MMOL: 142; 276 INJECTION, SOLUTION INTRAVENOUS at 08:59

## 2025-07-31 RX ADMIN — PANTOPRAZOLE SODIUM 40 MG: 40 INJECTION, POWDER, FOR SOLUTION INTRAVENOUS at 05:01

## 2025-07-31 RX ADMIN — Medication 5 ML: at 05:05

## 2025-07-31 RX ADMIN — POTASSIUM CHLORIDE 20 MEQ: 1500 TABLET, EXTENDED RELEASE ORAL at 08:59

## 2025-07-31 RX ADMIN — TICAGRELOR 90 MG: 90 TABLET, FILM COATED ORAL at 08:59

## 2025-07-31 ASSESSMENT — ACTIVITIES OF DAILY LIVING (ADL)
ADLS_ACUITY_SCORE: 46
ADLS_ACUITY_SCORE: 46
ADLS_ACUITY_SCORE: 43
ADLS_ACUITY_SCORE: 46
ADLS_ACUITY_SCORE: 44
ADLS_ACUITY_SCORE: 43
ADLS_ACUITY_SCORE: 44
ADLS_ACUITY_SCORE: 44
ADLS_ACUITY_SCORE: 43
ADLS_ACUITY_SCORE: 46
ADLS_ACUITY_SCORE: 44
ADLS_ACUITY_SCORE: 43
ADLS_ACUITY_SCORE: 44
ADLS_ACUITY_SCORE: 46
ADLS_ACUITY_SCORE: 46
ADLS_ACUITY_SCORE: 44
ADLS_ACUITY_SCORE: 46
ADLS_ACUITY_SCORE: 44
ADLS_ACUITY_SCORE: 44
ADLS_ACUITY_SCORE: 46
ADLS_ACUITY_SCORE: 46

## 2025-07-31 NOTE — PROGRESS NOTES
Cardiology Progress Note          Assessment and Plan:         80 year old male with known coronary disease with prior inferior STEMI in 2017 status post RCA stenting, ischemic and nonischemic cardiomyopathy, paroxysmal VT status post ablation, hyperlipidemia, ascending aortic dilatation,  history of DVT and PE, history of tobacco use and esophageal cancer (on chemotherapy) who was admitted on 7/26/2025 with an acute inferior ST elevation myocardial infarction.    He is status post PCI with drug-eluting stent placement x 2 to the proximal/mid right coronary artery.  Echocardiography this admission demonstrated an LVEF of 50 to 55%.      He was transferred to the ICU on 07/28/2025 after experiencing hematemesis with associated hypotension.  EGD demonstrated a clot at the distal stent but no evidence of active bleeding.  Brilinta was on hold but restarted after 24 hours.  Eliquis is still on hold given ongoing issues with anemia.    IMPRESSION:    Acute inferior ST elevation myocardial infarction status post drug-eluting stent placement x 2 to the right coronary artery.  History of DVT/PE.  Upper GI bleed with EGD findings as above.  Hemoglobin trended downwards yesterday requiring PRBC transfusion x 2.   History of esophageal cancer, recently started on chemotherapy.  Moderate dilatation of the ascending thoracic aorta on echocardiography this admission.  History of anaphylaxis with aspirin.    PLAN    - Hemoglobin has been stable on Brilinta.  Eliquis is still on hold.  - Would restart metoprolol.  -Regarding chemotherapy, I would recommend an alternative to 5-FU which is part of the FOLFOX regimen but has significant cardiac toxicity.  Ideally he should be seen by cardio oncology prior to resumption of chemotherapy.  - Will sign off.  Please call with questions.          Interval History:     Feels well.  No chest discomfort this morning.              Review of Systems:   As per subjective, otherwise 5 systems  "reviewed and negative.           Physical Exam:   Blood pressure 132/71, pulse 72, temperature 97.5  F (36.4  C), temperature source Oral, resp. rate 18, height 1.74 m (5' 8.5\"), weight 88.7 kg (195 lb 9.6 oz), SpO2 94%.      Vital Sign Ranges  Temperature Temp  Av.9  F (36.6  C)  Min: 97.5  F (36.4  C)  Max: 98.9  F (37.2  C)   Blood pressure Systolic (24hrs), Av , Min:86 , Max:144        Diastolic (24hrs), Av, Min:60, Max:108      Pulse Pulse  Av.3  Min: 52  Max: 107   Respirations Resp  Av.4  Min: 12  Max: 24   Pulse oximetry SpO2  Av.9 %  Min: 85 %  Max: 99 %         Intake/Output Summary (Last 24 hours) at 2025 1552  Last data filed at 2025 1140  Gross per 24 hour   Intake --   Output 2350 ml   Net -2350 ml       Constitutional: NAD  Skin: Warm and dry  Neck: No JVD  Lungs: CTA  Cardiovascular: RRR, no m/r/g  Abdomen: Soft, non tender.  Extremities and Back: No LE edema  Neurological: Nonfocal           Medications:     I have reviewed this patient's current medications.              Data:     Labs reviewed.           Kirby Clay MD, M.D.    "

## 2025-07-31 NOTE — PROGRESS NOTES
Care Management Follow Up    Length of Stay (days): 5    Expected Discharge Date: 08/01/2025     Concerns to be Addressed: discharge planning     Patient plan of care discussed at interdisciplinary rounds: Yes    Anticipated Discharge Disposition: Home, Home Care              Anticipated Discharge Services: Home Care  Anticipated Discharge DME: [facility]    Patient/family educated on Medicare website which has current facility and service quality ratings:    Education Provided on the Discharge Plan: Yes  Patient/Family in Agreement with the Plan: yes    Referrals Placed by CM/SW:    Private pay costs discussed: Not applicable    Discussed  Partnership in Safe Discharge Planning  document with patient/family: No     Handoff Completed: No, handoff not indicated or clinically appropriate    Additional Information:  Homecare recommended at discharge and referral made to [facility].  Writer will also assist with f/up with cardiology at [facility].     Next Steps: Schedule cards follow up.    Addendum:  8:08 AM    Follow up with Carley Bueno at [facility] on Tuesday, August 26th at 10:15 AM at:    [facility]   [address]   [address] [address]   [phone]    Writer updated patient and spouse with cardiology appointment and homecare details.     Dina Rivera RN Care Coordinator  [facility]  [phone]

## 2025-07-31 NOTE — PROGRESS NOTES
[facility]    Medicine Progress Note - Hospitalist Service    Date of Admission:  7/26/2025  Date of Service: 07/30/2025    Assessment & Plan     Aydin Blanc is a 80 year old male with a history of MI, CAD s/p stent placement, DVT, PE anticoagulated with Eliquis, NSVT, and esophageal adenocarcinoma who presents to the ED via EMS with chest pressure with STEMI in the inferior leads with EMS.  Patient was taken to cath lab and noted to have 99% mid RCA disease that is heavily calcified.  He underwent PCI with 2 stent placement.  He was started on Brillinta and did not receive asa due to allergies.    # Upper GIB suspected 2/2 bleeding esophageal tumor   # Esophageal carcinoma, currently undergoing chemotherapy (last infusion 7/25)  # Acute blood loss anemia due to above  Assessment: Developed hematemesis evening of 7/27/25 and transferred to ICU 07/28. Resuscitated with 1 unit PRBCs and 2L crystalloid. Briefly required vasopressors, but has been weaned off.  Baseline Hb 11/13 range. Dropped to 7.6 on 7/28.  EGD 7/28/25 --> clotted blood in the lower 1/3rd of the esophagus, cardia, and gastric fundus suspected from esophageal cancer. No active bleeding visualized under clotting. Unfortunately, his esophageal cancer is the source of his bleeding, this may be a difficult situation to control.   Plan:  - GI following, appreciate recs (signed off 7/28, will need to discuss DOAC):  IF recurrent bleeding, consult IR   Discuss acx/anti-plt therapy resumption as above --> Ok to resume brillinta 7/28, but continue to hold apixaban at this time.   - CTA chest/abd/pelvis 7/28 --> No active bleeding identified   7/29: Hb 7.4. No further hematemesis. Monitor Hb q8h, transfuse for Hb <8.  Received 1 unit PRBC.  -7/30: 1 more unit PRBC transfused for Hb 7.8, Hb came up to 8.8 after this.  Concerned that Hb has been trending down despite PRBC transfusions [2-3 so far].  This could be due to numbers  "\"catching up\".  Low suspicion for ongoing GI bleed given overall hemodynamic stability, patient passing brown stool, no further hematemesis/melena/hematochezia.  Also patient just received chemotherapy prior to this admission and this could be causing marrow suppression and contributing to persistent anemia.  Continue to monitor Hb every 8 hours.  Fortunately repeat Hb this afternoon returned at 9.  If Hb trends down again, would consult IR for likely IVC filter placement and possible IR angiogram pending clinical picture.  However at this time will monitor Hb for another 24 hours and if stable/trending up-would challenge again with Eliquis.  - PPI IV BID continued  - daily CBC  - Diet: Ok for diet --> cardiac diet ordered    ## Acute inferior wall STEMI  # HLD  Patient  presented with chest pain and EKG changes initial troponin 22 with suspected inferior wall MI.  He underwent emergent LHC with mid RCA disease at 99% heavily calcified and underwent JEROME x 2.  Held Eliquis and Brilinta resumed for 12 months  ASA not given in setting of allergies  TTE -> Left ventricular systolic function is low normal, ejection fraction is 50-55%.  Continue telemetry monitoring  metoprolol succinate 25 mg daily can be resumed if BP remains stable.  Increased atorvastatin to 80 mg daily (goal LDL <55)     #Hypophosphatemia  Replace per protocol    ## Hx of DVT/PE  Patient diagnosed with recent small PE at [facility] and started on Eliquis 2 weeks prior.  Has prior history of DVT in leg in 2021.  Did complain of some muscle cramp/twitching in a small localized area just lateral to his left knee.  Will try topical muscle rub, muscle relaxant.  If persistent pain or swelling, will obtain ultrasound to rule out DVT.  - Currently Eliquis is on hold as above.  If unable to resume Eliquis soon [could consider challenging with IV heparin drip], would consult IR for IVC placement.    ## Hx of Esophageal Cancer  Patient is followed by [facility] "[facility] and recently underwent chemotherapy infusion with FOLFOX 6 just prior to admission.  Will need close f/u with Oncology at discharge    Diet: cardiac diet  DVT Prophylaxis: Pneumatic Compression Devices  Rae Catheter: Not present  Lines: PRESENT                Diet: Mechanical/Dental Soft Diet  Snacks/Supplements Adult: Ensure Plus High Protein; Between Meals    DVT Prophylaxis: Pneumatic Compression Devices  Rae Catheter: Not present  Lines: PRESENT      Port a Cath 07/26/25 Right Chest wall-Site Assessment: WDL      Cardiac Monitoring: ACTIVE order. Indication: imc  Code Status: Full Code      Clinically Significant Risk Factors               # Hypoalbuminemia: Lowest albumin = 2.4 g/dL at 7/30/2025  5:26 AM, will monitor as appropriate  # Coagulation Defect: INR = 1.18 (Ref range: 0.85 - 1.15) and/or PTT = 30 Seconds (Ref range: 22 - 38 Seconds), will monitor for bleeding               # Overweight: Estimated body mass index is 29.38 kg/m  as calculated from the following:    Height as of this encounter: 1.74 m (5' 8.5\").    Weight as of this encounter: 89 kg (196 lb 1.6 oz)., PRESENT ON ADMISSION     # Financial/Environmental Concerns: none         Social Drivers of Health    Tobacco Use: High Risk (7/28/2025)    Patient History     Smoking Tobacco Use: Light Smoker     Smokeless Tobacco Use: Unknown          Disposition Plan     Medically Ready for Discharge: Anticipated in 2-4 Days, likely home with home cares pending Hb stability, anticoagulation plans.             Diana Mason MD  Hospitalist Service    Securely message with [employer] (more info)  Text page via [facility] Paging/Directory   ______________________________________________________________________    Interval History     Patient was seen in CCU.  He was alert and laying in bed.  States he is feeling better today.  More energy.  Had a formed brown stool today.  No abdominal pain, nausea or vomiting.  Did " complain of a localized area of muscle twitching/cramps just lateral to his left knee.  This has been bothering him since last night.  Will try muscle rub/muscle relaxant, low threshold to obtain ultrasound for DVT.    Physical Exam   Vital Signs: Temp: 98  F (36.7  C) Temp src: Oral BP: (!) 156/75 Pulse: 76   Resp: 16 SpO2: 97 % O2 Device: None (Room air) Oxygen Delivery: 2 LPM  Weight: 196 lbs 1.6 oz    Constitutional: alert, cooperative, no distress  Respiratory: Nonlabored breathing, clear anteriorly  Heart: RRR, no significant edema  Abdomen: Soft, no significant tenderness, bowel sounds heard  Skin: No concerning lesions, no swelling or tenderness in lower extremities  Psych: Alert and pleasant, mood and affect appropriate  Neuro: Moving all extremities, speech is fluent, alert and oriented    ----------------------------------------------------------------------------------------    Medical Decision Making       50 MINUTES SPENT BY ME on the date of service doing chart review, history, exam, documentation & further activities per the note.      Data   ------------------------- PAST 24 HR DATA REVIEWED -----------------------------------------------    I have personally reviewed the following data over the past 24 hrs:    9.0  \   9.0 (L)   / 239     138 106 30.5 (H) /  94   3.8 22 0.80 \     ALT: 22 AST: 28 AP: 163 (H) TBILI: 0.7   ALB: 2.4 (L) TOT PROTEIN: 4.9 (L) LIPASE: N/A       Imaging results reviewed over the past 24 hrs:   No results found for this or any previous visit (from the past 24 hours).    ------------------------- ENCOUNTER LABS ----------------------------------------------------------------  Recent Labs   Lab 07/30/25  1439 07/30/25  0526 07/29/25  2215 07/29/25  1247 07/29/25  0409 07/28/25  1015 07/28/25  0605 07/28/25  0227 07/28/25  0125 07/26/25  2106 07/26/25  1805   WBC  --  9.0  --   --   --   --   --   --  14.5*  --  9.8   HGB 9.0* 8.8* 7.8*   < > 7.4*   < > 9.1*  --  7.6*  --   11.1*   MCV 81 80 82   < > 82   < > 82  --  86  --  85   PLT  --  239  --   --   --   --   --   --  364  --  312   INR  --   --   --   --   --   --  1.18*  --   --   --  1.21*   NA  --  138  --   --  137  --  136  --  136   < > 136   POTASSIUM  --  3.8  --   --  4.0  --  4.1  --  4.4   < > 4.5   CHLORIDE  --  106  --   --  105  --  105  --  102   < > 99   CO2  --  22  --   --  23  --  20*  --  22   < > 21*   BUN  --  30.5*  --   --  45.6*  --  58.4*  --  52.8*   < > 26.4*   CR  --  0.80  --   --  0.82  --  0.87  --  0.83   < > 0.77   ANIONGAP  --  10  --   --  9  --  11  --  12   < > 16*   GAGE  --  8.5*  --   --  8.1*  --  8.9  --  8.7*   < > 9.9   GLC  --  94  --   --  100*  --  144*   < > 111*   < > 168*   ALBUMIN  --  2.4*  --   --  2.5*  --   --   --   --   --   --    PROTTOTAL  --  4.9*  --   --  4.6*  --   --   --   --   --   --    BILITOTAL  --  0.7  --   --  0.4  --   --   --   --   --   --    ALKPHOS  --  163*  --   --  148  --   --   --   --   --   --    ALT  --  22  --   --  25  --   --   --   --   --   --    AST  --  28  --   --  21  --   --   --   --   --   --     < > = values in this interval not displayed.       Most Recent 3 CBC's:  Recent Labs   Lab Test 07/30/25  1439 07/30/25  0526 07/29/25  2215 07/28/25  0605 07/28/25  0125 07/26/25  1805   WBC  --  9.0  --   --  14.5* 9.8   HGB 9.0* 8.8* 7.8*   < > 7.6* 11.1*   MCV 81 80 82   < > 86 85   PLT  --  239  --   --  364 312    < > = values in this interval not displayed.     Most Recent 3 BMP's:  Recent Labs   Lab Test 07/30/25  0526 07/29/25  0409 07/28/25  0605    137 136   POTASSIUM 3.8 4.0 4.1   CHLORIDE 106 105 105   CO2 22 23 20*   BUN 30.5* 45.6* 58.4*   CR 0.80 0.82 0.87   ANIONGAP 10 9 11   GAGE 8.5* 8.1* 8.9   GLC 94 100* 144*     Most Recent 2 LFT's:  Recent Labs   Lab Test 07/30/25  0526 07/29/25  0409   AST 28 21   ALT 22 25   ALKPHOS 163* 148   BILITOTAL 0.7 0.4     Most Recent 3 INR's:  Recent Labs   Lab Test 07/28/25  0605  07/26/25  1805   INR 1.18* 1.21*     Most Recent 3 Troponin's:No lab results found.  Most Recent 3 BNP's:  Recent Labs   Lab Test 07/28/25  0125   NTBNP 1,927*     Most Recent D-dimer:No lab results found.  Most Recent Cholesterol Panel:  Recent Labs   Lab Test 07/26/25 2120   CHOL 148   LDL 94   HDL 44   TRIG 49     Most Recent 6 Bacteria Isolates From Any Culture (See EPIC Reports for Culture Details):No lab results found.  Most Recent TSH and T4:  Recent Labs   Lab Test 07/26/25 2120   TSH 0.77     Most Recent Hemoglobin A1c:No lab results found.  Most Recent Urinalysis:No lab results found.  Most Recent ESR & CRP:No lab results found.

## 2025-07-31 NOTE — PLAN OF CARE
Goal Outcome Evaluation:    VSS. Monitor shows Sinus rhythm with PVCs. Pt. Is alert and oriented. Denies pain. Hemoglobin 9.3. Pt. Up to Bedside commode. Had small black stool. Continue to monitor.

## 2025-07-31 NOTE — PROGRESS NOTES
[facility]    Medicine Progress Note - Hospitalist Service    Date of Admission:  7/26/2025  Date of Service: 07/31/2025    Assessment & Plan     Aydin Blanc is a 80 year old male with a history of MI, CAD s/p stent placement, DVT, PE anticoagulated with Eliquis, NSVT, and esophageal adenocarcinoma who presents to the ED via EMS with chest pressure with STEMI in the inferior leads with EMS.  Patient was taken to cath lab and noted to have 99% mid RCA disease that is heavily calcified.  He underwent PCI with 2 stent placement.  He was started on Brillinta and did not receive asa due to allergies.    # Upper GIB suspected 2/2 bleeding esophageal tumor   # Esophageal carcinoma, currently undergoing chemotherapy (last infusion 7/25)  # Acute blood loss anemia due to above  Assessment: Developed hematemesis evening of 7/27/25 and transferred to ICU 07/28. Resuscitated with 1 unit PRBCs and 2L crystalloid. Briefly required vasopressors, but has been weaned off.  Baseline Hb 11/13 range. Dropped to 7.6 on 7/28.  EGD 7/28/25 --> clotted blood in the lower 1/3rd of the esophagus, cardia, and gastric fundus suspected from esophageal cancer. No active bleeding visualized under clotting. Unfortunately, his esophageal cancer is the source of his bleeding, this may be a difficult situation to control.   Plan:  - GI following, appreciate recs (signed off 7/28, will need to discuss DOAC):  IF recurrent bleeding, consult IR   Discuss acx/anti-plt therapy resumption as above --> Ok to resume brillinta 7/28, but continue to hold apixaban at this time (possibly start 08/01)  - CTA chest/abd/pelvis 7/28 --> No active bleeding identified   7/29: Hb 7.4. No further hematemesis. Monitor Hb q8h, transfuse for Hb <8.  Received 1 unit PRBC.  -7/30: 1 more unit PRBC transfused for Hb 7.8, Hb came up to 8.8 after this.  Concerned that Hb has been trending down despite PRBC transfusions [2-3 so far].  This  "could be due to numbers \"catching up\".  Low suspicion for ongoing GI bleed given overall hemodynamic stability, patient passing brown stool, no further hematemesis/melena/hematochezia.  Also patient just received chemotherapy prior to this admission and this could be causing marrow suppression and contributing to persistent anemia.  Continue to monitor Hb every 8 hours.  Fortunately repeat Hb this afternoon returned at 9.  If Hb trends down again, would consult IR for likely IVC filter placement and possible IR angiogram pending clinical picture.  However at this time will monitor Hb for another 24 hours and if stable/trending up-would challenge again with Eliquis.  - PPI IV BID continued -> change to PO  - daily CBC  - Diet: Ok for diet --> cardiac diet ordered    ## Acute inferior wall STEMI  # HLD  Patient  presented with chest pain and EKG changes initial troponin 22 with suspected inferior wall MI.  He underwent emergent LHC with mid RCA disease at 99% heavily calcified and underwent JEROME x 2.  Held Eliquis while Brilinta resumed for 12 months  ASA not given in setting of allergies  TTE -> Left ventricular systolic function is low normal, ejection fraction is 50-55%.  Continue telemetry monitoring  metoprolol succinate 25 mg daily can be resumed if BP remains stable.  Increased atorvastatin to 80 mg daily (goal LDL <55)     #Hypophosphatemia  Replace per protocol    ## Hx of DVT/PE  Patient diagnosed with recent small PE at [facility] and started on Eliquis 2 weeks prior.  Has prior history of DVT in leg in 2021.  Did complain of some muscle cramp/twitching in a small localized area just lateral to his left knee.  Will try topical muscle rub, muscle relaxant.  If persistent pain or swelling, will obtain ultrasound to rule out DVT.  - Currently Eliquis is on hold as above.  If unable to resume Eliquis soon [could consider challenging with IV heparin drip], would consult IR for IVC placement.    ## Hx of " "Esophageal Cancer  Patient is followed by [facility] and recently underwent chemotherapy infusion with FOLFOX 6 just prior to admission.  Will need close f/u with Oncology at discharge    Diet: cardiac diet  DVT Prophylaxis: Pneumatic Compression Devices  Rae Catheter: Not present  Lines: PRESENT                Diet: Mechanical/Dental Soft Diet  Snacks/Supplements Adult: Ensure Plus High Protein; Between Meals    DVT Prophylaxis: Pneumatic Compression Devices  Rae Catheter: Not present  Lines: PRESENT      Port a Cath 07/26/25 Right Chest wall-Site Assessment: WDL      Cardiac Monitoring: ACTIVE order. Indication: imc  Code Status: Full Code      Clinically Significant Risk Factors               # Hypoalbuminemia: Lowest albumin = 2.4 g/dL at 7/30/2025  5:26 AM, will monitor as appropriate                # Overweight: Estimated body mass index is 29.3 kg/m  as calculated from the following:    Height as of this encounter: 1.74 m (5' 8.5\").    Weight as of this encounter: 88.7 kg (195 lb 9.6 oz).      # Financial/Environmental Concerns: none         Social Drivers of Health    Tobacco Use: High Risk (7/28/2025)    Patient History     Smoking Tobacco Use: Light Smoker     Smokeless Tobacco Use: Unknown          Disposition Plan     Medically Ready for Discharge: Anticipated in 2-4 Days, likely home with home cares pending Hb stability, anticoagulation plans.             Shane Linder MD  Hospitalist Service  [facility]  Securely message with [employer] (more info)  Text page via [employer] Paging/Directory   ______________________________________________________________________    Interval History     Patient was seen in CCU.  He was alert and laying in bed.  States he is feeling better today.  More energy.    No CP/SOB  BPs stable, Hgb stable  No abdominal pain  No nausea / vomiting  No new complaints    Physical Exam   Vital Signs: Temp: 98  F (36.7  C) Temp src: Oral BP: 123/72 Pulse: 94   " Resp: 18 SpO2: 96 % O2 Device: None (Room air)    Weight: 195 lbs 9.6 oz    Constitutional: alert, cooperative, no distress  Respiratory: Nonlabored breathing, clear anteriorly  Heart: RRR, no significant edema  Abdomen: Soft, no significant tenderness, bowel sounds heard  Skin: No concerning lesions, no swelling or tenderness in lower extremities  Psych: Alert and pleasant, mood and affect appropriate  Neuro: Moving all extremities, speech is fluent, alert and oriented    ----------------------------------------------------------------------------------------    Medical Decision Making       35 MINUTES SPENT BY ME on the date of service doing chart review, history, exam, documentation & further activities per the note.      Data   ------------------------- PAST 24 HR DATA REVIEWED -----------------------------------------------    I have personally reviewed the following data over the past 24 hrs:    N/A  \   9.3 (L)   / N/A     136 103 18.9 /  96   3.5 22 0.76 \     ALT: 22 AST: 25 AP: 176 (H) TBILI: 0.6   ALB: 2.6 (L) TOT PROTEIN: 5.0 (L) LIPASE: N/A       Imaging results reviewed over the past 24 hrs:   No results found for this or any previous visit (from the past 24 hours).    ------------------------- ENCOUNTER LABS ----------------------------------------------------------------  Recent Labs   Lab 07/31/25  1309 07/31/25  0509 07/30/25  2306 07/30/25  1439 07/30/25  0526 07/29/25  1247 07/29/25  0409 07/28/25  1015 07/28/25  0605 07/28/25  0227 07/28/25  0125 07/26/25  2106 07/26/25  1805   WBC  --   --   --   --  9.0  --   --   --   --   --  14.5*  --  9.8   HGB 9.3* 8.4* 8.1*   < > 8.8*   < > 7.4*   < > 9.1*  --  7.6*  --  11.1*   MCV 81 81 81   < > 80   < > 82   < > 82  --  86  --  85   PLT  --   --   --   --  239  --   --   --   --   --  364  --  312   INR  --   --   --   --   --   --   --   --  1.18*  --   --   --  1.21*   NA  --  136  --   --  138  --  137  --  136  --  136   < > 136   POTASSIUM  --   3.5  --   --  3.8  --  4.0  --  4.1  --  4.4   < > 4.5   CHLORIDE  --  103  --   --  106  --  105  --  105  --  102   < > 99   CO2  --  22  --   --  22  --  23  --  20*  --  22   < > 21*   BUN  --  18.9  --   --  30.5*  --  45.6*  --  58.4*  --  52.8*   < > 26.4*   CR  --  0.76  --   --  0.80  --  0.82  --  0.87  --  0.83   < > 0.77   ANIONGAP  --  11  --   --  10  --  9  --  11  --  12   < > 16*   GAGE  --  8.1*  --   --  8.5*  --  8.1*  --  8.9  --  8.7*   < > 9.9   GLC  --  96  --   --  94  --  100*  --  144*   < > 111*   < > 168*   ALBUMIN  --  2.6*  --   --  2.4*  --  2.5*   < >  --   --   --   --   --    PROTTOTAL  --  5.0*  --   --  4.9*  --  4.6*   < >  --   --   --   --   --    BILITOTAL  --  0.6  --   --  0.7  --  0.4   < >  --   --   --   --   --    ALKPHOS  --  176*  --   --  163*  --  148   < >  --   --   --   --   --    ALT  --  22  --   --  22  --  25   < >  --   --   --   --   --    AST  --  25  --   --  28  --  21   < >  --   --   --   --   --     < > = values in this interval not displayed.       Most Recent 3 CBC's:  Recent Labs   Lab Test 07/31/25  1309 07/31/25  0509 07/30/25  2306 07/30/25  1439 07/30/25  0526 07/28/25  0605 07/28/25  0125 07/26/25  1805   WBC  --   --   --   --  9.0  --  14.5* 9.8   HGB 9.3* 8.4* 8.1*   < > 8.8*   < > 7.6* 11.1*   MCV 81 81 81   < > 80   < > 86 85   PLT  --   --   --   --  239  --  364 312    < > = values in this interval not displayed.     Most Recent 3 BMP's:  Recent Labs   Lab Test 07/31/25  0509 07/30/25  0526 07/29/25  0409    138 137   POTASSIUM 3.5 3.8 4.0   CHLORIDE 103 106 105   CO2 22 22 23   BUN 18.9 30.5* 45.6*   CR 0.76 0.80 0.82   ANIONGAP 11 10 9   GAGE 8.1* 8.5* 8.1*   GLC 96 94 100*     Most Recent 2 LFT's:  Recent Labs   Lab Test 07/31/25  0509 07/30/25  0526   AST 25 28   ALT 22 22   ALKPHOS 176* 163*   BILITOTAL 0.6 0.7     Most Recent 3 INR's:  Recent Labs   Lab Test 07/28/25  0605 07/26/25  1805   INR 1.18* 1.21*     Most Recent 3  Troponin's:No lab results found.  Most Recent 3 BNP's:  Recent Labs   Lab Test 07/28/25  0125   NTBNP 1,927*     Most Recent D-dimer:No lab results found.  Most Recent Cholesterol Panel:  Recent Labs   Lab Test 07/26/25 2120   CHOL 148   LDL 94   HDL 44   TRIG 49     Most Recent 6 Bacteria Isolates From Any Culture (See EPIC Reports for Culture Details):No lab results found.  Most Recent TSH and T4:  Recent Labs   Lab Test 07/26/25 2120   TSH 0.77     Most Recent Hemoglobin A1c:No lab results found.  Most Recent Urinalysis:No lab results found.  Most Recent ESR & CRP:No lab results found.

## 2025-07-31 NOTE — PLAN OF CARE
"Pt here with STEMI and upper GIB    Neuro: Alert/oriented, no focal neuro deficits  Tele/Cardiac: SR with PVCs  Respiration:  Room air  Pain: No complaints of pain or discomfort   Drips: Intermittent IV medications through port   GI/: Up to the bathroom, incontinence this shift  Activity: Assist of 1    Will continue to monitor    /66   Pulse 67   Temp 98.2  F (36.8  C) (Oral)   Resp 18   Ht 1.74 m (5' 8.5\")   Wt 88.7 kg (195 lb 9.6 oz)   SpO2 95%   BMI 29.30 kg/m        "

## 2025-07-31 NOTE — PROGRESS NOTES
[facility]     Patient is anticipated to discharge 8/1.  Patient agrees to have [facility] provide SN PT OT services. Patient will receive a phone call from [facility] to schedule an initial home care visit post discharge from the hospital. Anticipated start of care date is [within 24-48 hours of discharge].     Please note: SOC date is based on availability of the day referral was received. If patients discharge date changes, please reach out to Clinical Liaison or the HUB to ensure SOC date is still appropriate for a safe discharge plan.     Thank you for the referral.     CHRIS Evangelista, LPN  [facility] Liaison   Cell: [phone]

## 2025-08-01 ENCOUNTER — APPOINTMENT (OUTPATIENT)
Dept: OCCUPATIONAL THERAPY | Facility: CLINIC | Age: 80
DRG: 323 | End: 2025-08-01
Payer: COMMERCIAL

## 2025-08-01 LAB
ANION GAP SERPL CALCULATED.3IONS-SCNC: 12 MMOL/L (ref 7–15)
BUN SERPL-MCNC: 15.6 MG/DL (ref 8–23)
CALCIUM SERPL-MCNC: 8.1 MG/DL (ref 8.8–10.4)
CHLORIDE SERPL-SCNC: 104 MMOL/L (ref 98–107)
CREAT SERPL-MCNC: 0.79 MG/DL (ref 0.67–1.17)
EGFRCR SERPLBLD CKD-EPI 2021: 89 ML/MIN/1.73M2
ERYTHROCYTE [DISTWIDTH] IN BLOOD BY AUTOMATED COUNT: 19.2 % (ref 10–15)
GLUCOSE SERPL-MCNC: 93 MG/DL (ref 70–99)
HCO3 SERPL-SCNC: 22 MMOL/L (ref 22–29)
HCT VFR BLD AUTO: 25.6 % (ref 40–53)
HGB BLD-MCNC: 8.3 G/DL (ref 13.3–17.7)
HGB BLD-MCNC: 8.4 G/DL (ref 13.3–17.7)
MAGNESIUM SERPL-MCNC: 2.2 MG/DL (ref 1.7–2.3)
MCH RBC QN AUTO: 26.6 PG (ref 26.5–33)
MCHC RBC AUTO-ENTMCNC: 32.4 G/DL (ref 31.5–36.5)
MCV RBC AUTO: 82 FL (ref 78–100)
MCV RBC AUTO: 82 FL (ref 78–100)
PHOSPHATE SERPL-MCNC: 2.2 MG/DL (ref 2.5–4.5)
PLATELET # BLD AUTO: 236 10E3/UL (ref 150–450)
POTASSIUM SERPL-SCNC: 3.6 MMOL/L (ref 3.4–5.3)
RBC # BLD AUTO: 3.12 10E6/UL (ref 4.4–5.9)
SODIUM SERPL-SCNC: 138 MMOL/L (ref 135–145)
WBC # BLD AUTO: 7 10E3/UL (ref 4–11)

## 2025-08-01 PROCEDURE — 250N000013 HC RX MED GY IP 250 OP 250 PS 637: Performed by: NURSE PRACTITIONER

## 2025-08-01 PROCEDURE — 85018 HEMOGLOBIN: CPT | Performed by: STUDENT IN AN ORGANIZED HEALTH CARE EDUCATION/TRAINING PROGRAM

## 2025-08-01 PROCEDURE — 258N000003 HC RX IP 258 OP 636: Performed by: STUDENT IN AN ORGANIZED HEALTH CARE EDUCATION/TRAINING PROGRAM

## 2025-08-01 PROCEDURE — 250N000013 HC RX MED GY IP 250 OP 250 PS 637: Performed by: STUDENT IN AN ORGANIZED HEALTH CARE EDUCATION/TRAINING PROGRAM

## 2025-08-01 PROCEDURE — 250N000011 HC RX IP 250 OP 636: Performed by: INTERNAL MEDICINE

## 2025-08-01 PROCEDURE — 97110 THERAPEUTIC EXERCISES: CPT | Mod: GO

## 2025-08-01 PROCEDURE — 120N000001 HC R&B MED SURG/OB

## 2025-08-01 PROCEDURE — 250N000009 HC RX 250: Performed by: STUDENT IN AN ORGANIZED HEALTH CARE EDUCATION/TRAINING PROGRAM

## 2025-08-01 PROCEDURE — 84100 ASSAY OF PHOSPHORUS: CPT | Performed by: HOSPITALIST

## 2025-08-01 PROCEDURE — 97530 THERAPEUTIC ACTIVITIES: CPT | Mod: GO

## 2025-08-01 PROCEDURE — 80048 BASIC METABOLIC PNL TOTAL CA: CPT | Performed by: STUDENT IN AN ORGANIZED HEALTH CARE EDUCATION/TRAINING PROGRAM

## 2025-08-01 PROCEDURE — 99232 SBSQ HOSP IP/OBS MODERATE 35: CPT | Performed by: STUDENT IN AN ORGANIZED HEALTH CARE EDUCATION/TRAINING PROGRAM

## 2025-08-01 PROCEDURE — 83735 ASSAY OF MAGNESIUM: CPT | Performed by: HOSPITALIST

## 2025-08-01 RX ORDER — PANTOPRAZOLE SODIUM 40 MG/1
40 TABLET, DELAYED RELEASE ORAL
Status: DISCONTINUED | OUTPATIENT
Start: 2025-08-01 | End: 2025-08-03 | Stop reason: HOSPADM

## 2025-08-01 RX ORDER — POTASSIUM CHLORIDE 1500 MG/1
20 TABLET, EXTENDED RELEASE ORAL ONCE
Status: COMPLETED | OUTPATIENT
Start: 2025-08-01 | End: 2025-08-01

## 2025-08-01 RX ADMIN — ATORVASTATIN CALCIUM 80 MG: 80 TABLET, FILM COATED ORAL at 20:31

## 2025-08-01 RX ADMIN — POTASSIUM CHLORIDE 20 MEQ: 1500 TABLET, EXTENDED RELEASE ORAL at 10:01

## 2025-08-01 RX ADMIN — PANTOPRAZOLE SODIUM 40 MG: 40 INJECTION, POWDER, FOR SOLUTION INTRAVENOUS at 05:29

## 2025-08-01 RX ADMIN — TICAGRELOR 90 MG: 90 TABLET, FILM COATED ORAL at 20:31

## 2025-08-01 RX ADMIN — Medication 5 ML: at 05:32

## 2025-08-01 RX ADMIN — TICAGRELOR 90 MG: 90 TABLET, FILM COATED ORAL at 10:08

## 2025-08-01 RX ADMIN — METOPROLOL SUCCINATE 25 MG: 25 TABLET, EXTENDED RELEASE ORAL at 10:01

## 2025-08-01 RX ADMIN — SODIUM PHOSPHATE, MONOBASIC, MONOHYDRATE AND SODIUM PHOSPHATE, DIBASIC, ANHYDROUS 15 MMOL: 142; 276 INJECTION, SOLUTION INTRAVENOUS at 12:18

## 2025-08-01 RX ADMIN — PANTOPRAZOLE SODIUM 40 MG: 40 TABLET, DELAYED RELEASE ORAL at 16:20

## 2025-08-01 RX ADMIN — Medication 5 ML: at 17:41

## 2025-08-01 ASSESSMENT — ACTIVITIES OF DAILY LIVING (ADL)
ADLS_ACUITY_SCORE: 44
ADLS_ACUITY_SCORE: 37
ADLS_ACUITY_SCORE: 37
ADLS_ACUITY_SCORE: 44
ADLS_ACUITY_SCORE: 37
ADLS_ACUITY_SCORE: 41
ADLS_ACUITY_SCORE: 44
ADLS_ACUITY_SCORE: 44
ADLS_ACUITY_SCORE: 37
ADLS_ACUITY_SCORE: 44
ADLS_ACUITY_SCORE: 44
ADLS_ACUITY_SCORE: 41
ADLS_ACUITY_SCORE: 44
ADLS_ACUITY_SCORE: 41
ADLS_ACUITY_SCORE: 44
ADLS_ACUITY_SCORE: 37
ADLS_ACUITY_SCORE: 44
ADLS_ACUITY_SCORE: 41
ADLS_ACUITY_SCORE: 44
ADLS_ACUITY_SCORE: 44
ADLS_ACUITY_SCORE: 41

## 2025-08-01 NOTE — PLAN OF CARE
Orientations: A&O x4  Vitals/Pain: VSS on RA, denies pain  Tele: SR  Lines/Drains: Chest port heparin locked  Skin/Wounds: intact, birthmark on back  GI/: continent  Labs: Abnormal/Trends, Electrolyte Replacement- Hgb stable at 9.3  Ambulation/Assist: SBA w/GB/W  Plan: monitor Hgb and determine when appropriate to restart Eliquis    Pt transferred to  with all belongings at 2130

## 2025-08-01 NOTE — PLAN OF CARE
Goal Outcome Evaluation:      Plan of Care Reviewed With: patient    Overall Patient Progress: improvingOverall Patient Progress: improving    A&Ox4. VSS on RN. Denies pain, Chest pain, SOB. Tele-SR. PIV SL. Tolerating Mechanical soft diet. Void to BR. Passing flatus, BM x1 this shift. Up A1 gt/w. Chest port heparin locked. K, Mag, Phos recheck in AM. Hgb 8.3 this morning. Wife at bedside. Discharge pending.

## 2025-08-01 NOTE — PROGRESS NOTES
Orientations: A&O x4  Vitals/Pain: VSS on RA, denies pain  Tele: SR  Lines/Drains: Chest port heparin locked  Skin/Wounds: intact, birthmark on back  GI/: continent. No BM since arriving to unit  Labs: Abnormal/Trends, Electrolyte Replacement- Hgb stable at 9.3  Ambulation/Assist: SBA w/GB/W  Plan: monitor Hgb and determine when appropriate to restart Eliquis

## 2025-08-01 NOTE — PROGRESS NOTES
Summary: STEMI, s/p stenting to RCA.  Upper GIB secondary to bleeding esophageal tumor.    Esophageal carcinoma, currently undergoing chemotherapy (last infusion 7/25)  Date/shift: 8/1/25; [id number]  Orientation: A&O x4  Vitals/Pain: VSS on RA, denies pain  Tele: SR with occasional PVCs.   Lines/Drains: Port-a-cath on R chest; currently infusing phosphorous replacement  Skin/Wounds: intact except blanchable redness on coccyx; birthmark on back  GI/: continent. BM x2 this shift/ black in color   Labs: Hgb 8.3/ recheck this evening at 1800. K+ 3.6/ replaced/ recheck in AM. Phosphorous 2.2; replacement in progress/ recheck in AM. Mg++ WNL/ recheck in AM. ( See other results in chart)  Ambulation/Assist: assist of 1 w/GB/W  Plan: monitor Hgb and determine when appropriate to restart Eliquis

## 2025-08-01 NOTE — PROGRESS NOTES
[facility]    Medicine Progress Note - Hospitalist Service    Date of Admission:  7/26/2025  Date of Service: 08/01/2025    Assessment & Plan     Aydin Blanc is a 80 year old male with a history of MI, CAD s/p stent placement, DVT, PE anticoagulated with Eliquis, NSVT, and esophageal adenocarcinoma who presents to the ED via EMS with chest pressure with STEMI in the inferior leads with EMS.  Patient was taken to cath lab and noted to have 99% mid RCA disease that is heavily calcified.  He underwent PCI with 2 stent placement.  He was started on Brillinta and did not receive asa due to allergies.    # Upper GIB suspected 2/2 bleeding esophageal tumor   # Esophageal carcinoma, currently undergoing chemotherapy (last infusion 7/25)  # Acute blood loss anemia due to above  Assessment: Developed hematemesis evening of 7/27/25 and transferred to ICU 07/28. Resuscitated with 1 unit PRBCs and 2L crystalloid. Briefly required vasopressors, but has been weaned off.  Baseline Hb 11/13 range. Dropped to 7.6 on 7/28.  EGD 7/28/25 --> clotted blood in the lower 1/3rd of the esophagus, cardia, and gastric fundus suspected from esophageal cancer. No active bleeding visualized under clotting. Unfortunately, his esophageal cancer is the source of his bleeding, this may be a difficult situation to control.   Plan:  - GI following, appreciate recs (signed off 7/28)  IF recurrent bleeding, consult IR   Discuss acx/anti-plt therapy resumption as above --> Ok to resume brillinta 7/28, but continue to hold apixaban at this time (start 08/01 evening if Hgb stable)  - CTA chest/abd/pelvis 7/28 --> No active bleeding identified   7/29: Hb 7.4. No further hematemesis. Monitor Hb q8h, transfuse for Hb <8.  Received 1 unit PRBC.  -7/30: 1 more unit PRBC transfused for Hb 7.8, Hb came up to 8.8 after this.  Concerned that Hb has been trending down despite PRBC transfusions [2-3 so far].  This could be due to  "numbers \"catching up\".  Low suspicion for ongoing GI bleed given overall hemodynamic stability, patient passing brown stool, no further hematemesis/melena/hematochezia.  Also patient just received chemotherapy prior to this admission and this could be causing marrow suppression and contributing to persistent anemia.  Continue to monitor Hb every 8 hours.  Fortunately repeat Hb this afternoon returned at 9.  If Hb trends down again, would consult IR for likely IVC filter placement and possible IR angiogram pending clinical picture.  However at this time will monitor Hb for another 24 hours and if stable/trending up-would challenge again with Eliquis.  - PPI IV BID continued -> change to PO BID  - daily CBC  - Diet: Ok for diet --> cardiac diet ordered    ## Acute inferior wall STEMI  # HLD  Patient  presented with chest pain and EKG changes initial troponin 22 with suspected inferior wall MI.  He underwent emergent LHC with mid RCA disease at 99% heavily calcified and underwent JEROME x 2.  Held Eliquis for now -> while Brilinta resumed for 12 months  ASA not given in setting of allergies  TTE -> Left ventricular systolic function is low normal, ejection fraction is 50-55%.  Continue telemetry monitoring  metoprolol succinate 25 mg daily can be resumed if BP remains stable.  Increased atorvastatin to 80 mg daily (goal LDL <55)     #Hypophosphatemia  Replace per protocol    ## Hx of DVT/PE  Patient diagnosed with recent small PE at [facility] and started on Eliquis 2 weeks prior.  Has prior history of DVT in leg in 2021.  Did complain of some muscle cramp/twitching in a small localized area just lateral to his left knee.  Will try topical muscle rub, muscle relaxant.  If persistent pain or swelling, will obtain ultrasound to rule out DVT.  - Currently Eliquis is on hold as above.  If unable to resume Eliquis soon [could consider challenging with IV heparin drip], would consult IR for IVC placement.    ## Hx of " "Esophageal Cancer  Patient is followed by [facility] and recently underwent chemotherapy infusion with FOLFOX 6 just prior to admission.  Will need close f/u with Oncology at discharge    Diet: cardiac diet  DVT Prophylaxis: Pneumatic Compression Devices  Rae Catheter: Not present  Lines: PRESENT                Diet: Mechanical/Dental Soft Diet  Snacks/Supplements Adult: Ensure Plus High Protein; Between Meals    DVT Prophylaxis: Pneumatic Compression Devices  Rae Catheter: Not present  Lines: PRESENT      Port a Cath 07/26/25 Right Chest wall-Site Assessment: WDL      Cardiac Monitoring: ACTIVE order. Indication: imc  Code Status: Full Code      Clinically Significant Risk Factors               # Hypoalbuminemia: Lowest albumin = 2.4 g/dL at 7/30/2025  5:26 AM, will monitor as appropriate                # Overweight: Estimated body mass index is 29.39 kg/m  as calculated from the following:    Height as of this encounter: 1.74 m (5' 8.5\").    Weight as of this encounter: 89 kg (196 lb 3.2 oz).      # Financial/Environmental Concerns: none         Social Drivers of Health    Tobacco Use: High Risk (7/28/2025)    Patient History     Smoking Tobacco Use: Light Smoker     Smokeless Tobacco Use: Unknown          Disposition Plan     Medically Ready for Discharge: Anticipated in 2-4 Days, likely home with home cares pending Hb stability, anticoagulation plans.             Shane Linder MD  Hospitalist Service  [facility]  Securely message with [employer] (more info)  Text page via [employer] Paging/Directory   ______________________________________________________________________    Interval History     No acute events overnight  Frustrated he was given oral phosp this AM, would like liquid replacement.   No CP/SOB  BPs stable, Hgb down to 8.3 but no bloody stools  No abdominal pain  No nausea / vomiting  No new complaints    Physical Exam   Vital Signs: Temp: 98.1  F (36.7  C) Temp src: Oral BP: " 119/69 Pulse: 79   Resp: 18 SpO2: 97 % O2 Device: None (Room air)    Weight: 196 lbs 3.2 oz    Constitutional: alert, cooperative, no distress  Respiratory: Nonlabored breathing, clear anteriorly  Heart: RRR, no significant edema  Abdomen: Soft, no significant tenderness, bowel sounds heard  Skin: No concerning lesions, no swelling or tenderness in lower extremities  Psych: Alert and pleasant, mood and affect appropriate  Neuro: Moving all extremities, speech is fluent, alert and oriented    ----------------------------------------------------------------------------------------    Medical Decision Making       40 MINUTES SPENT BY ME on the date of service doing chart review, history, exam, documentation & further activities per the note.      Data   ------------------------- PAST 24 HR DATA REVIEWED -----------------------------------------------    I have personally reviewed the following data over the past 24 hrs:    7.0  \   8.3 (L)   / 236     138 104 15.6 /  93   3.6 22 0.79 \       Imaging results reviewed over the past 24 hrs:   No results found for this or any previous visit (from the past 24 hours).    ------------------------- ENCOUNTER LABS ----------------------------------------------------------------  Recent Labs   Lab 08/01/25  0526 07/31/25  1309 07/31/25  0509 07/30/25  1439 07/30/25  0526 07/28/25  1015 07/28/25  0605 07/28/25  0227 07/28/25  0125 07/26/25  2106 07/26/25  1805   WBC 7.0  --   --   --  9.0  --   --   --  14.5*  --  9.8   HGB 8.3* 9.3* 8.4*   < > 8.8*   < > 9.1*  --  7.6*  --  11.1*   MCV 82 81 81   < > 80   < > 82  --  86  --  85     --   --   --  239  --   --   --  364  --  312   INR  --   --   --   --   --   --  1.18*  --   --   --  1.21*     --  136  --  138   < > 136  --  136   < > 136   POTASSIUM 3.6  --  3.5  --  3.8   < > 4.1  --  4.4   < > 4.5   CHLORIDE 104  --  103  --  106   < > 105  --  102   < > 99   CO2 22  --  22  --  22   < > 20*  --  22   < > 21*    BUN 15.6  --  18.9  --  30.5*   < > 58.4*  --  52.8*   < > 26.4*   CR 0.79  --  0.76  --  0.80   < > 0.87  --  0.83   < > 0.77   ANIONGAP 12  --  11  --  10   < > 11  --  12   < > 16*   GAGE 8.1*  --  8.1*  --  8.5*   < > 8.9  --  8.7*   < > 9.9   GLC 93  --  96  --  94   < > 144*   < > 111*   < > 168*   ALBUMIN  --   --  2.6*  --  2.4*   < >  --   --   --   --   --    PROTTOTAL  --   --  5.0*  --  4.9*   < >  --   --   --   --   --    BILITOTAL  --   --  0.6  --  0.7   < >  --   --   --   --   --    ALKPHOS  --   --  176*  --  163*   < >  --   --   --   --   --    ALT  --   --  22  --  22   < >  --   --   --   --   --    AST  --   --  25  --  28   < >  --   --   --   --   --     < > = values in this interval not displayed.       Most Recent 3 CBC's:  Recent Labs   Lab Test 08/01/25 0526 07/31/25  1309 07/31/25  0509 07/30/25  1439 07/30/25  0526 07/28/25  0605 07/28/25  0125   WBC 7.0  --   --   --  9.0  --  14.5*   HGB 8.3* 9.3* 8.4*   < > 8.8*   < > 7.6*   MCV 82 81 81   < > 80   < > 86     --   --   --  239  --  364    < > = values in this interval not displayed.     Most Recent 3 BMP's:  Recent Labs   Lab Test 08/01/25 0526 07/31/25  0509 07/30/25  0526    136 138   POTASSIUM 3.6 3.5 3.8   CHLORIDE 104 103 106   CO2 22 22 22   BUN 15.6 18.9 30.5*   CR 0.79 0.76 0.80   ANIONGAP 12 11 10   GAGE 8.1* 8.1* 8.5*   GLC 93 96 94     Most Recent 2 LFT's:  Recent Labs   Lab Test 07/31/25  0509 07/30/25  0526   AST 25 28   ALT 22 22   ALKPHOS 176* 163*   BILITOTAL 0.6 0.7     Most Recent 3 INR's:  Recent Labs   Lab Test 07/28/25  0605 07/26/25  1805   INR 1.18* 1.21*     Most Recent 3 Troponin's:No lab results found.  Most Recent 3 BNP's:  Recent Labs   Lab Test 07/28/25  0125   NTBNP 1,927*     Most Recent D-dimer:No lab results found.  Most Recent Cholesterol Panel:  Recent Labs   Lab Test 07/26/25  2120   CHOL 148   LDL 94   HDL 44   TRIG 49     Most Recent 6 Bacteria Isolates From Any Culture (See EPIC  Reports for Culture Details):No lab results found.  Most Recent TSH and T4:  Recent Labs   Lab Test 07/26/25 2120   TSH 0.77     Most Recent Hemoglobin A1c:No lab results found.  Most Recent Urinalysis:No lab results found.  Most Recent ESR & CRP:No lab results found.

## 2025-08-02 ENCOUNTER — APPOINTMENT (OUTPATIENT)
Dept: OCCUPATIONAL THERAPY | Facility: CLINIC | Age: 80
DRG: 323 | End: 2025-08-02
Payer: COMMERCIAL

## 2025-08-02 LAB
ANION GAP SERPL CALCULATED.3IONS-SCNC: 8 MMOL/L (ref 7–15)
BUN SERPL-MCNC: 11.6 MG/DL (ref 8–23)
CALCIUM SERPL-MCNC: 8.5 MG/DL (ref 8.8–10.4)
CHLORIDE SERPL-SCNC: 105 MMOL/L (ref 98–107)
CREAT SERPL-MCNC: 0.8 MG/DL (ref 0.67–1.17)
EGFRCR SERPLBLD CKD-EPI 2021: 89 ML/MIN/1.73M2
ERYTHROCYTE [DISTWIDTH] IN BLOOD BY AUTOMATED COUNT: 19.5 % (ref 10–15)
GLUCOSE SERPL-MCNC: 104 MG/DL (ref 70–99)
HCO3 SERPL-SCNC: 23 MMOL/L (ref 22–29)
HCT VFR BLD AUTO: 27.6 % (ref 40–53)
HGB BLD-MCNC: 9 G/DL (ref 13.3–17.7)
HGB BLD-MCNC: 9 G/DL (ref 13.3–17.7)
HGB BLD-MCNC: 9.4 G/DL (ref 13.3–17.7)
MAGNESIUM SERPL-MCNC: 2.1 MG/DL (ref 1.7–2.3)
MCH RBC QN AUTO: 26.8 PG (ref 26.5–33)
MCHC RBC AUTO-ENTMCNC: 32.6 G/DL (ref 31.5–36.5)
MCV RBC AUTO: 82 FL (ref 78–100)
PHOSPHATE SERPL-MCNC: 2.1 MG/DL (ref 2.5–4.5)
PLATELET # BLD AUTO: 253 10E3/UL (ref 150–450)
POTASSIUM SERPL-SCNC: 3.8 MMOL/L (ref 3.4–5.3)
RBC # BLD AUTO: 3.36 10E6/UL (ref 4.4–5.9)
SODIUM SERPL-SCNC: 136 MMOL/L (ref 135–145)
WBC # BLD AUTO: 7.6 10E3/UL (ref 4–11)

## 2025-08-02 PROCEDURE — 120N000001 HC R&B MED SURG/OB

## 2025-08-02 PROCEDURE — 99232 SBSQ HOSP IP/OBS MODERATE 35: CPT | Performed by: STUDENT IN AN ORGANIZED HEALTH CARE EDUCATION/TRAINING PROGRAM

## 2025-08-02 PROCEDURE — 85027 COMPLETE CBC AUTOMATED: CPT | Performed by: STUDENT IN AN ORGANIZED HEALTH CARE EDUCATION/TRAINING PROGRAM

## 2025-08-02 PROCEDURE — 250N000013 HC RX MED GY IP 250 OP 250 PS 637: Performed by: STUDENT IN AN ORGANIZED HEALTH CARE EDUCATION/TRAINING PROGRAM

## 2025-08-02 PROCEDURE — 250N000013 HC RX MED GY IP 250 OP 250 PS 637: Performed by: NURSE PRACTITIONER

## 2025-08-02 PROCEDURE — 250N000011 HC RX IP 250 OP 636: Performed by: INTERNAL MEDICINE

## 2025-08-02 PROCEDURE — 250N000009 HC RX 250: Performed by: STUDENT IN AN ORGANIZED HEALTH CARE EDUCATION/TRAINING PROGRAM

## 2025-08-02 PROCEDURE — 80048 BASIC METABOLIC PNL TOTAL CA: CPT | Performed by: STUDENT IN AN ORGANIZED HEALTH CARE EDUCATION/TRAINING PROGRAM

## 2025-08-02 PROCEDURE — 85018 HEMOGLOBIN: CPT | Performed by: STUDENT IN AN ORGANIZED HEALTH CARE EDUCATION/TRAINING PROGRAM

## 2025-08-02 PROCEDURE — 83735 ASSAY OF MAGNESIUM: CPT | Performed by: STUDENT IN AN ORGANIZED HEALTH CARE EDUCATION/TRAINING PROGRAM

## 2025-08-02 PROCEDURE — 97110 THERAPEUTIC EXERCISES: CPT | Mod: GO | Performed by: OCCUPATIONAL THERAPIST

## 2025-08-02 PROCEDURE — 258N000003 HC RX IP 258 OP 636: Performed by: STUDENT IN AN ORGANIZED HEALTH CARE EDUCATION/TRAINING PROGRAM

## 2025-08-02 PROCEDURE — 84100 ASSAY OF PHOSPHORUS: CPT | Performed by: STUDENT IN AN ORGANIZED HEALTH CARE EDUCATION/TRAINING PROGRAM

## 2025-08-02 RX ORDER — POTASSIUM CHLORIDE 1500 MG/1
20 TABLET, EXTENDED RELEASE ORAL ONCE
Status: COMPLETED | OUTPATIENT
Start: 2025-08-02 | End: 2025-08-02

## 2025-08-02 RX ADMIN — Medication 5 ML: at 18:20

## 2025-08-02 RX ADMIN — APIXABAN 5 MG: 5 TABLET, FILM COATED ORAL at 20:59

## 2025-08-02 RX ADMIN — TICAGRELOR 90 MG: 90 TABLET, FILM COATED ORAL at 20:58

## 2025-08-02 RX ADMIN — METOPROLOL SUCCINATE 25 MG: 25 TABLET, EXTENDED RELEASE ORAL at 08:43

## 2025-08-02 RX ADMIN — PANTOPRAZOLE SODIUM 40 MG: 40 TABLET, DELAYED RELEASE ORAL at 15:51

## 2025-08-02 RX ADMIN — TICAGRELOR 90 MG: 90 TABLET, FILM COATED ORAL at 08:42

## 2025-08-02 RX ADMIN — PANTOPRAZOLE SODIUM 40 MG: 40 TABLET, DELAYED RELEASE ORAL at 06:35

## 2025-08-02 RX ADMIN — POTASSIUM CHLORIDE 20 MEQ: 1500 TABLET, EXTENDED RELEASE ORAL at 11:11

## 2025-08-02 RX ADMIN — ATORVASTATIN CALCIUM 80 MG: 80 TABLET, FILM COATED ORAL at 20:59

## 2025-08-02 RX ADMIN — Medication 5 ML: at 15:39

## 2025-08-02 RX ADMIN — SODIUM PHOSPHATE, MONOBASIC, MONOHYDRATE AND SODIUM PHOSPHATE, DIBASIC, ANHYDROUS 15 MMOL: 142; 276 INJECTION, SOLUTION INTRAVENOUS at 11:30

## 2025-08-02 RX ADMIN — APIXABAN 5 MG: 5 TABLET, FILM COATED ORAL at 08:42

## 2025-08-02 RX ADMIN — Medication 5 ML: at 06:34

## 2025-08-02 ASSESSMENT — ACTIVITIES OF DAILY LIVING (ADL)
ADLS_ACUITY_SCORE: 37
ADLS_ACUITY_SCORE: 40
ADLS_ACUITY_SCORE: 40
ADLS_ACUITY_SCORE: 37
ADLS_ACUITY_SCORE: 37
ADLS_ACUITY_SCORE: 40
ADLS_ACUITY_SCORE: 37
ADLS_ACUITY_SCORE: 40
ADLS_ACUITY_SCORE: 37
ADLS_ACUITY_SCORE: 40
ADLS_ACUITY_SCORE: 37
ADLS_ACUITY_SCORE: 40

## 2025-08-02 NOTE — PLAN OF CARE
Goal Outcome Evaluation:    SUMMARY: STEMI, s/p stenting to RCA. Upper GIB secondary to bleeding esophageal tumor. Esophageal carcinoma, last chemotherapy infusion 7/25    DATE & TIME: 8/1/2025 [id number]  Cognitive Concerns/Orientation: A&O x 4  BEHAVIOR & AGGRESSION TOOL COLOR: Green  ABNL VS/O2: VSS on RA  MOBILITY: Ax1 w/ gaitbelt and walker  PAIN MANAGEMENT: Denies pain this shift  DIET: Mechanical soft  BOWEL/BLADDER: Continent B/B, no BM this shift  ABNL LAB/BG: Hbg 8.4 Q12hrs, recheck AM; also on K+, Mg+, and Phos protocol, recheck AM.   DRAIN/DEVICES: Port-a-cath on R chest, blood return positive, Hep locked.   TELEMETRY RHYTHM: Sinus w/ PVCs   SKIN: blanchable redness on coccyx/sacrum; birthmark on upper back  TESTS/PROCEDURES: N/a  D/C DAY/GOALS/PLACE: Pending Hbg stability and determine when appropriate to restart Eliquis  OTHER IMPORTANT INFO: Family very involved and concerned about care

## 2025-08-02 NOTE — PLAN OF CARE
Goal Outcome Evaluation:      Plan of Care Reviewed With: patient    Overall Patient Progress: improvingOverall Patient Progress: improving    A&Ox4. VSS on RN. Denies pain, Chest pain, SOB. Tele-SR with occasional PVCs. Tolerating Mechanical soft diet. Void to BR. Passing flatus, BM x1 no blood noted. Up A1 gt/w. Chest port heparin locked. K, Mag, Phos recheck in AM. Hgb 8.4, recheck in AM. Birthmark on upper back. Wife at bedside. Discharge pending.

## 2025-08-02 NOTE — PROGRESS NOTES
"[facility]    Medicine Progress Note - Hospitalist Service    Date of Admission:  7/26/2025  Date of Service: 08/02/2025    Assessment & Plan     Aydin Blanc is a 80 year old male with a history of MI, CAD s/p stent placement, DVT, PE anticoagulated with Eliquis, NSVT, and esophageal adenocarcinoma who presents to the ED via EMS with chest pressure with STEMI in the inferior leads with EMS.  Patient was taken to cath lab and noted to have 99% mid RCA disease that is heavily calcified.  He underwent PCI with 2 stent placement.  He was started on Brillinta and did not receive asa due to allergies.    # Upper GIB suspected 2/2 bleeding esophageal tumor   # Esophageal carcinoma, currently undergoing chemotherapy (last infusion 7/25)  # Acute blood loss anemia due to above  Assessment: Developed hematemesis evening of 7/27/25 and transferred to ICU 07/28. Resuscitated with 1 unit PRBCs and 2L crystalloid. Briefly required vasopressors, but has been weaned off.  Baseline Hb 11/13 range. Dropped to 7.6 on 7/28.  EGD 7/28/25 --> clotted blood in the lower 1/3rd of the esophagus, cardia, and gastric fundus suspected from esophageal cancer. No active bleeding visualized under clotting. Unfortunately, his esophageal cancer is the source of his bleeding, this may be a difficult situation to control.   Plan:  - GI following, appreciate recs (signed off 7/28)  IF recurrent bleeding, consult IR   Discuss acx/anti-plt therapy resumption as above --> Resume brillinta 7/28, and resumed Eliquis 08/02  - CTA chest/abd/pelvis 7/28 --> No active bleeding identified   7/29: Hb 7.4. No further hematemesis. Monitor Hb q8h, transfuse for Hb <8.  Received 1 unit PRBC.  -7/30: 1 more unit PRBC transfused for Hb 7.8, Hb came up to 8.8 after this.  Concerned that Hb has been trending down despite PRBC transfusions [2-3 so far].  This could be due to numbers \"catching up\".  Low suspicion for ongoing GI bleed " given overall hemodynamic stability, patient passing brown stool, no further hematemesis/melena/hematochezia.  Also patient just received chemotherapy prior to this admission and this could be causing marrow suppression and contributing to persistent anemia.  Continue to monitor Hb every 8 hours.  Fortunately repeat Hb this afternoon returned at 9.  If Hb trends down again, would consult IR for likely IVC filter placement and possible IR angiogram pending clinical picture.  However at this time will monitor Hb for another 24 hours and if stable/trending up-would challenge again with Eliquis.  - PPI IV BID continued -> change to PO BID  - daily CBC  - Diet: Ok for diet --> cardiac diet ordered    ## Acute inferior wall STEMI  # HLD  Patient  presented with chest pain and EKG changes initial troponin 22 with suspected inferior wall MI.  He underwent emergent LHC with mid RCA disease at 99% heavily calcified and underwent JEROME x 2.  Held Eliquis for now -> while Brilinta resumed for 12 months  ASA not given in setting of allergies  TTE -> Left ventricular systolic function is low normal, ejection fraction is 50-55%.  Continue telemetry monitoring  metoprolol succinate 25 mg daily can be resumed if BP remains stable.  Increased atorvastatin to 80 mg daily (goal LDL <55)     #Hypophosphatemia  Replace per protocol    ## Hx of DVT/PE  Patient diagnosed with recent small PE at [facility] and started on Eliquis 2 weeks prior.  Has prior history of DVT in leg in 2021.  Did complain of some muscle cramp/twitching in a small localized area just lateral to his left knee.  Will try topical muscle rub, muscle relaxant.  If persistent pain or swelling, will obtain ultrasound to rule out DVT.  - Currently Eliquis resumed 08/02    ## Hx of Esophageal Cancer  Patient is followed by [facility] and recently underwent chemotherapy infusion with FOLFOX 6 just prior to admission.  Will need close f/u with Oncology at  "discharge    Diet: cardiac diet  DVT Prophylaxis: Pneumatic Compression Devices  Rae Catheter: Not present  Lines: PRESENT                Diet: Mechanical/Dental Soft Diet  Snacks/Supplements Adult: Ensure Plus High Protein; Between Meals    DVT Prophylaxis: Pneumatic Compression Devices  Rae Catheter: Not present  Lines: PRESENT      Port a Cath 07/26/25 Right Chest wall-Site Assessment: WDL      Cardiac Monitoring: ACTIVE order. Indication: imc  Code Status: Full Code      Clinically Significant Risk Factors               # Hypoalbuminemia: Lowest albumin = 2.4 g/dL at 7/30/2025  5:26 AM, will monitor as appropriate                # Overweight: Estimated body mass index is 29.08 kg/m  as calculated from the following:    Height as of this encounter: 1.74 m (5' 8.5\").    Weight as of this encounter: 88 kg (194 lb 1.6 oz).      # Financial/Environmental Concerns: none         Social Drivers of Health    Tobacco Use: High Risk (7/28/2025)    Patient History     Smoking Tobacco Use: Light Smoker     Smokeless Tobacco Use: Unknown          Disposition Plan     Medically Ready for Discharge: Anticipated in 2-4 Days, likely home with home cares pending Hb stability, anticoagulation plans.             Shane Linder MD  Hospitalist Service  [facility]  Securely message with [employer] (more info)  Text page via [employer] Paging/Directory   ______________________________________________________________________    Interval History     No acute events overnight  No CP/SOB  BPs stable, Hgb better today. No bloody stools.  No abdominal pain  No nausea / vomiting  No new complaints    Physical Exam   Vital Signs: Temp: 98.3  F (36.8  C) Temp src: Oral BP: (!) 141/75 Pulse: 66   Resp: 20 SpO2: 98 % O2 Device: None (Room air)    Weight: 194 lbs 1.6 oz    Constitutional: alert, cooperative, no distress  Respiratory: Nonlabored breathing, clear anteriorly  Heart: RRR, no significant edema  Abdomen: Soft, no " significant tenderness, bowel sounds heard  Skin: No concerning lesions, no swelling or tenderness in lower extremities  Psych: Alert and pleasant, mood and affect appropriate  Neuro: Moving all extremities, speech is fluent, alert and oriented    ----------------------------------------------------------------------------------------    Medical Decision Making       35 MINUTES SPENT BY ME on the date of service doing chart review, history, exam, documentation & further activities per the note.      Data   ------------------------- PAST 24 HR DATA REVIEWED -----------------------------------------------    I have personally reviewed the following data over the past 24 hrs:    7.6  \   9.0 (L); 9.0 (L)   / 253     136 105 11.6 /  104 (H)   3.8 23 0.80 \       Imaging results reviewed over the past 24 hrs:   No results found for this or any previous visit (from the past 24 hours).    ------------------------- ENCOUNTER LABS ----------------------------------------------------------------  Recent Labs   Lab 08/02/25  0625 08/01/25  1740 08/01/25  0526 07/31/25  1309 07/31/25  0509 07/30/25  1439 07/30/25  0526 07/28/25  1015 07/28/25  0605 07/26/25  2106 07/26/25  1805   WBC 7.6  --  7.0  --   --   --  9.0  --   --    < > 9.8   HGB 9.0*  9.0* 8.4* 8.3*   < > 8.4*   < > 8.8*   < > 9.1*   < > 11.1*   MCV 82  82 82 82   < > 81   < > 80   < > 82   < > 85     --  236  --   --   --  239  --   --    < > 312   INR  --   --   --   --   --   --   --   --  1.18*  --  1.21*     --  138  --  136  --  138   < > 136   < > 136   POTASSIUM 3.8  --  3.6  --  3.5  --  3.8   < > 4.1   < > 4.5   CHLORIDE 105  --  104  --  103  --  106   < > 105   < > 99   CO2 23  --  22  --  22  --  22   < > 20*   < > 21*   BUN 11.6  --  15.6  --  18.9  --  30.5*   < > 58.4*   < > 26.4*   CR 0.80  --  0.79  --  0.76  --  0.80   < > 0.87   < > 0.77   ANIONGAP 8  --  12  --  11  --  10   < > 11   < > 16*   GAGE 8.5*  --  8.1*  --  8.1*  --   8.5*   < > 8.9   < > 9.9   *  --  93  --  96  --  94   < > 144*   < > 168*   ALBUMIN  --   --   --   --  2.6*  --  2.4*   < >  --   --   --    PROTTOTAL  --   --   --   --  5.0*  --  4.9*   < >  --   --   --    BILITOTAL  --   --   --   --  0.6  --  0.7   < >  --   --   --    ALKPHOS  --   --   --   --  176*  --  163*   < >  --   --   --    ALT  --   --   --   --  22  --  22   < >  --   --   --    AST  --   --   --   --  25  --  28   < >  --   --   --     < > = values in this interval not displayed.       Most Recent 3 CBC's:  Recent Labs   Lab Test 08/02/25  0625 08/01/25  1740 08/01/25  0526 07/30/25  1439 07/30/25  0526   WBC 7.6  --  7.0  --  9.0   HGB 9.0*  9.0* 8.4* 8.3*   < > 8.8*   MCV 82  82 82 82   < > 80     --  236  --  239    < > = values in this interval not displayed.     Most Recent 3 BMP's:  Recent Labs   Lab Test 08/02/25  0625 08/01/25  0526 07/31/25  0509    138 136   POTASSIUM 3.8 3.6 3.5   CHLORIDE 105 104 103   CO2 23 22 22   BUN 11.6 15.6 18.9   CR 0.80 0.79 0.76   ANIONGAP 8 12 11   GAGE 8.5* 8.1* 8.1*   * 93 96     Most Recent 2 LFT's:  Recent Labs   Lab Test 07/31/25  0509 07/30/25  0526   AST 25 28   ALT 22 22   ALKPHOS 176* 163*   BILITOTAL 0.6 0.7     Most Recent 3 INR's:  Recent Labs   Lab Test 07/28/25  0605 07/26/25  1805   INR 1.18* 1.21*     Most Recent 3 Troponin's:No lab results found.  Most Recent 3 BNP's:  Recent Labs   Lab Test 07/28/25  0125   NTBNP 1,927*     Most Recent D-dimer:No lab results found.  Most Recent Cholesterol Panel:  Recent Labs   Lab Test 07/26/25 2120   CHOL 148   LDL 94   HDL 44   TRIG 49     Most Recent 6 Bacteria Isolates From Any Culture (See EPIC Reports for Culture Details):No lab results found.  Most Recent TSH and T4:  Recent Labs   Lab Test 07/26/25 2120   TSH 0.77     Most Recent Hemoglobin A1c:No lab results found.  Most Recent Urinalysis:No lab results found.  Most Recent ESR & CRP:No lab results found.

## 2025-08-02 NOTE — PLAN OF CARE
Goal Outcome Evaluation:    Patient up with assist of 1 and walker. Adequate I/O. Patient denies pain. Family at bedside throughout shift.

## 2025-08-03 VITALS
DIASTOLIC BLOOD PRESSURE: 70 MMHG | HEIGHT: 69 IN | SYSTOLIC BLOOD PRESSURE: 117 MMHG | TEMPERATURE: 98 F | HEART RATE: 72 BPM | BODY MASS INDEX: 29.09 KG/M2 | RESPIRATION RATE: 18 BRPM | WEIGHT: 196.43 LBS | OXYGEN SATURATION: 94 %

## 2025-08-03 LAB
ATRIAL RATE - MUSE: 89 BPM
DIASTOLIC BLOOD PRESSURE - MUSE: NORMAL MMHG
HGB BLD-MCNC: 8.8 G/DL (ref 13.3–17.7)
INTERPRETATION ECG - MUSE: NORMAL
MAGNESIUM SERPL-MCNC: 1.9 MG/DL (ref 1.7–2.3)
MCV RBC AUTO: 83 FL (ref 78–100)
P AXIS - MUSE: 48 DEGREES
PHOSPHATE SERPL-MCNC: 2.3 MG/DL (ref 2.5–4.5)
POTASSIUM SERPL-SCNC: 3.7 MMOL/L (ref 3.4–5.3)
PR INTERVAL - MUSE: 178 MS
QRS DURATION - MUSE: 96 MS
QT - MUSE: 456 MS
QTC - MUSE: 554 MS
R AXIS - MUSE: -14 DEGREES
SYSTOLIC BLOOD PRESSURE - MUSE: NORMAL MMHG
T AXIS - MUSE: 98 DEGREES
VENTRICULAR RATE- MUSE: 89 BPM

## 2025-08-03 PROCEDURE — 84100 ASSAY OF PHOSPHORUS: CPT | Performed by: STUDENT IN AN ORGANIZED HEALTH CARE EDUCATION/TRAINING PROGRAM

## 2025-08-03 PROCEDURE — 250N000013 HC RX MED GY IP 250 OP 250 PS 637: Performed by: STUDENT IN AN ORGANIZED HEALTH CARE EDUCATION/TRAINING PROGRAM

## 2025-08-03 PROCEDURE — 85018 HEMOGLOBIN: CPT | Performed by: STUDENT IN AN ORGANIZED HEALTH CARE EDUCATION/TRAINING PROGRAM

## 2025-08-03 PROCEDURE — 250N000009 HC RX 250: Performed by: INTERNAL MEDICINE

## 2025-08-03 PROCEDURE — 250N000013 HC RX MED GY IP 250 OP 250 PS 637: Performed by: NURSE PRACTITIONER

## 2025-08-03 PROCEDURE — 250N000011 HC RX IP 250 OP 636: Performed by: INTERNAL MEDICINE

## 2025-08-03 PROCEDURE — 84132 ASSAY OF SERUM POTASSIUM: CPT | Performed by: STUDENT IN AN ORGANIZED HEALTH CARE EDUCATION/TRAINING PROGRAM

## 2025-08-03 PROCEDURE — 258N000003 HC RX IP 258 OP 636: Performed by: INTERNAL MEDICINE

## 2025-08-03 PROCEDURE — 250N000013 HC RX MED GY IP 250 OP 250 PS 637: Performed by: INTERNAL MEDICINE

## 2025-08-03 PROCEDURE — 83735 ASSAY OF MAGNESIUM: CPT | Performed by: STUDENT IN AN ORGANIZED HEALTH CARE EDUCATION/TRAINING PROGRAM

## 2025-08-03 PROCEDURE — 99239 HOSP IP/OBS DSCHRG MGMT >30: CPT | Performed by: STUDENT IN AN ORGANIZED HEALTH CARE EDUCATION/TRAINING PROGRAM

## 2025-08-03 RX ORDER — PANTOPRAZOLE SODIUM 40 MG/1
40 TABLET, DELAYED RELEASE ORAL
Qty: 120 TABLET | Refills: 0 | Status: SHIPPED | OUTPATIENT
Start: 2025-08-03

## 2025-08-03 RX ORDER — MAGNESIUM OXIDE 400 MG/1
400 TABLET ORAL EVERY 4 HOURS
Status: COMPLETED | OUTPATIENT
Start: 2025-08-03 | End: 2025-08-03

## 2025-08-03 RX ORDER — POTASSIUM CHLORIDE 1500 MG/1
20 TABLET, EXTENDED RELEASE ORAL ONCE
Status: COMPLETED | OUTPATIENT
Start: 2025-08-03 | End: 2025-08-03

## 2025-08-03 RX ORDER — ATORVASTATIN CALCIUM 80 MG/1
80 TABLET, FILM COATED ORAL EVERY EVENING
Qty: 90 TABLET | Refills: 0 | Status: SHIPPED | OUTPATIENT
Start: 2025-08-03

## 2025-08-03 RX ORDER — METOPROLOL SUCCINATE 25 MG/1
25 TABLET, EXTENDED RELEASE ORAL DAILY
Qty: 90 TABLET | Refills: 0 | Status: SHIPPED | OUTPATIENT
Start: 2025-08-04

## 2025-08-03 RX ADMIN — TICAGRELOR 90 MG: 90 TABLET, FILM COATED ORAL at 08:57

## 2025-08-03 RX ADMIN — Medication 5 ML: at 06:09

## 2025-08-03 RX ADMIN — PANTOPRAZOLE SODIUM 40 MG: 40 TABLET, DELAYED RELEASE ORAL at 06:31

## 2025-08-03 RX ADMIN — Medication 5 ML: at 14:05

## 2025-08-03 RX ADMIN — METOPROLOL SUCCINATE 25 MG: 25 TABLET, EXTENDED RELEASE ORAL at 08:51

## 2025-08-03 RX ADMIN — SODIUM PHOSPHATE, MONOBASIC, MONOHYDRATE AND SODIUM PHOSPHATE, DIBASIC, ANHYDROUS 15 MMOL: 142; 276 INJECTION, SOLUTION INTRAVENOUS at 08:55

## 2025-08-03 RX ADMIN — Medication 400 MG: at 08:52

## 2025-08-03 RX ADMIN — POTASSIUM CHLORIDE 20 MEQ: 1500 TABLET, EXTENDED RELEASE ORAL at 08:51

## 2025-08-03 RX ADMIN — Medication 5 ML: at 08:55

## 2025-08-03 RX ADMIN — Medication 5 ML: at 13:05

## 2025-08-03 RX ADMIN — APIXABAN 5 MG: 5 TABLET, FILM COATED ORAL at 08:52

## 2025-08-03 ASSESSMENT — ACTIVITIES OF DAILY LIVING (ADL)
ADLS_ACUITY_SCORE: 37
ADLS_ACUITY_SCORE: 37
ADLS_ACUITY_SCORE: 39
ADLS_ACUITY_SCORE: 37

## 2025-08-03 NOTE — DISCHARGE SUMMARY
"[facility]  Hospitalist Discharge Summary      Date of Admission:  7/26/2025  Date of Discharge:  8/3/2025  Discharging Provider: Shane Linder MD  Discharge Service: Hospitalist Service    Discharge Diagnoses     # Upper GIB suspected 2/2 bleeding esophageal tumor   # Esophageal carcinoma, currently undergoing chemotherapy (last infusion 7/25)  # Acute blood loss anemia due to above  # Acute inferior wall STEMI  # HLD  # HTN    Clinically Significant Risk Factors     # Overweight: Estimated body mass index is 29.43 kg/m  as calculated from the following:    Height as of this encounter: 1.74 m (5' 8.5\").    Weight as of this encounter: 89.1 kg (196 lb 6.9 oz).       Follow-ups Needed After Discharge   Follow-up Appointments       Follow Up      Follow up with Carley Bueno at [facility] on Tuesday, August 26th at 10:15 AM at:    [facility]   [address]   [address]   [place] [address]   [phone]    *YOU HAVE BEEN PLACED ON A WAIT LIST TO TRY TO BE SEEN SOONER THAN ABOVE DATE.  YOU CAN ALSO CALL THE CLINIC TO CHECK IF ANY EARLIER DATES AVAILABLE.        Hospital Follow-up with Existing Primary Care Provider (PCP)          Schedule Primary Care visit within: 30 Days               Unresulted Labs Ordered in the Past 30 Days of this Admission       No orders found from 6/26/2025 to 7/27/2025.          Discharge Disposition   Discharged to home  Condition at discharge: Stable    Hospital Course   Aydin Blanc is a 80 year old male with a history of MI, CAD s/p stent placement, DVT, PE anticoagulated with Eliquis, NSVT, and esophageal adenocarcinoma who presents to the ED via EMS with chest pressure with STEMI in the inferior leads with EMS.  Patient was taken to cath lab and noted to have 99% mid RCA disease that is heavily calcified.  He underwent PCI with 2 stent placement.  He was started on Brillinta and did not receive asa " "due to allergies.    # Upper GIB suspected 2/2 bleeding esophageal tumor   # Esophageal carcinoma, currently undergoing chemotherapy (last infusion 7/25)  # Acute blood loss anemia due to above  Assessment: Developed hematemesis evening of 7/27/25 and transferred to ICU 07/28. Resuscitated with 1 unit PRBCs and 2L crystalloid. Briefly required vasopressors, but has been weaned off.  Baseline Hb 11/13 range. Dropped to 7.6 on 7/28.  EGD 7/28/25 --> clotted blood in the lower 1/3rd of the esophagus, cardia, and gastric fundus suspected from esophageal cancer. No active bleeding visualized under clotting. Unfortunately, his esophageal cancer is the source of his bleeding, this may be a difficult situation to control.   Plan:  - GI following, appreciate recs (signed off 7/28)  IF recurrent bleeding, consult IR   Discuss acx/anti-plt therapy resumption as above --> Resume brillinta 7/28, and resumed Eliquis 08/02  - CTA chest/abd/pelvis 7/28 --> No active bleeding identified   7/29: Hb 7.4. No further hematemesis. Monitor Hb q8h, transfuse for Hb <8.  Received 1 unit PRBC.  -7/30: 1 more unit PRBC transfused for Hb 7.8, Hb came up to 8.8 after this.  Concerned that Hb has been trending down despite PRBC transfusions [2-3 so far].  This could be due to numbers \"catching up\".  Low suspicion for ongoing GI bleed given overall hemodynamic stability, patient passing brown stool, no further hematemesis/melena/hematochezia.  Also patient just received chemotherapy prior to this admission and this could be causing marrow suppression and contributing to persistent anemia.  Continue to monitor Hb every 8 hours.  Fortunately repeat Hb this afternoon returned at 9.  If Hb trends down again, would consult IR for likely IVC filter placement and possible IR angiogram pending clinical picture.  However at this time will monitor Hb for another 24 hours and if stable/trending up-would challenge again with Eliquis.  - PPI IV BID continued " -> change to PO BID  - daily CBC  - Diet: Ok for diet --> cardiac diet ordered    ## Acute inferior wall STEMI  # HLD  Patient  presented with chest pain and EKG changes initial troponin 22 with suspected inferior wall MI.  He underwent emergent LHC with mid RCA disease at 99% heavily calcified and underwent JEROME x 2.  Resumed Eliquis 08/02 -> while Brilinta resumed for 12 months  ASA not given in setting of allergies  TTE -> Left ventricular systolic function is low normal, ejection fraction is 50-55%.  Continue telemetry monitoring  metoprolol succinate 25 mg daily resumed  Increased atorvastatin to 80 mg daily (goal LDL <55)     #Hypophosphatemia  Replace per protocol    ## Hx of DVT/PE  Patient diagnosed with recent small PE at [facility] and started on Eliquis 2 weeks prior.  Has prior history of DVT in leg in 2021.  Did complain of some muscle cramp/twitching in a small localized area just lateral to his left knee.  Will try topical muscle rub, muscle relaxant.  If persistent pain or swelling, will obtain ultrasound to rule out DVT.  - Currently Eliquis resumed 08/02    ## Hx of Esophageal Cancer  Patient is followed by [facility] and recently underwent chemotherapy infusion with FOLFOX 6 just prior to admission.  Will need close f/u with Oncology at discharge        Consultations This Hospital Stay   HOSPITALIST IP CONSULT  NUTRITION SERVICES ADULT IP CONSULT  CARDIAC REHAB IP CONSULT  CARDIOLOGY IP CONSULT  GASTROENTEROLOGY IP CONSULT  INTENSIVIST IP CONSULT  CARE MANAGEMENT / SOCIAL WORK IP CONSULT  NUTRITION SERVICES ADULT IP CONSULT  CARE MANAGEMENT / SOCIAL WORK IP CONSULT  SMOKING CESSATION PROGRAM IP CONSULT  SMOKING CESSATION PROGRAM IP CONSULT    Code Status   Full Code    Time Spent on this Encounter   I, Shane Linder MD, personally saw the patient today and spent greater than 30 minutes discharging this patient.       Shane Linder MD  [address] [place] [address] [facility]  [address] [place] [address]  Phone: [phone]  ______________________________________________________________________    Physical Exam   Vital Signs: Temp: 98  F (36.7  C) Temp src: Oral BP: 117/70 Pulse: 72   Resp: 18 SpO2: 94 % O2 Device: None (Room air)    Weight: 196 lbs 6.88 oz  ----------------------------------------------------------------------------------------       Primary Care Physician   Ko HENDERSON Post    Discharge Orders      Cardiac Rehab  Referral      Cardiac Rehab  Referral      Home Care Referral      Reason aspirin not prescribed from this order set     Reason Lipid Lowering Medications not prescribed from this order set     Follow Up    Follow up with Carley Bueno at [facility] on Tuesday, August 26th at 10:15 AM at:    [facility]   [address]   [address]   [place] [id number] [phone]    *YOU HAVE BEEN PLACED ON A WAIT LIST TO TRY TO BE SEEN SOONER THAN ABOVE DATE.  YOU CAN ALSO CALL THE CLINIC TO CHECK IF ANY EARLIER DATES AVAILABLE.     Reason for your hospital stay    You had obstructive heart disease requiring stent placement and GI bleeding.     Activity    Your activity upon discharge: activity as tolerated     Walker Order for DME - ONLY FOR DME     Diet    Follow this diet upon discharge: Current Diet:Orders Placed This Encounter      Snacks/Supplements Adult: Ensure Plus High Protein; Between Meals      Mechanical/Dental Soft Diet     Hospital Follow-up with Existing Primary Care Provider (PCP)            Significant Results and Procedures   Most Recent 3 CBC's:  Recent Labs   Lab Test 08/03/25  0608 08/02/25  1819 08/02/25  0625 08/01/25  1740 08/01/25  0526 07/30/25  1439 07/30/25  0526   WBC  --   --  7.6  --  7.0  --  9.0   HGB 8.8* 9.4* 9.0*  9.0*   < > 8.3*   < > 8.8*   MCV 83 82 82  82   < > 82   < > 80   PLT  --   --  253  --  236  --  239    < > = values in this interval not displayed.      Most Recent 3 BMP's:  Recent Labs   Lab Test 08/03/25  0608 08/02/25  0625 08/01/25  0526 07/31/25  0509   NA  --  136 138 136   POTASSIUM 3.7 3.8 3.6 3.5   CHLORIDE  --  105 104 103   CO2  --  23 22 22   BUN  --  11.6 15.6 18.9   CR  --  0.80 0.79 0.76   ANIONGAP  --  8 12 11   GAGE  --  8.5* 8.1* 8.1*   GLC  --  104* 93 96     Most Recent 2 LFT's:  Recent Labs   Lab Test 07/31/25  0509 07/30/25  0526   AST 25 28   ALT 22 22   ALKPHOS 176* 163*   BILITOTAL 0.6 0.7     Most Recent 3 INR's:  Recent Labs   Lab Test 07/28/25  0605 07/26/25  1805   INR 1.18* 1.21*     Most Recent 3 Troponin's:No lab results found.  Most Recent 3 BNP's:  Recent Labs   Lab Test 07/28/25  0125   NTBNP 1,927*     Most Recent D-dimer:No lab results found.  Most Recent Cholesterol Panel:  Recent Labs   Lab Test 07/26/25 2120   CHOL 148   LDL 94   HDL 44   TRIG 49     7-Day Micro Results       No results found for the last 168 hours.          Most Recent TSH and T4:  Recent Labs   Lab Test 07/26/25 2120   TSH 0.77     Most Recent Hemoglobin A1c:No lab results found.  Most Recent Urinalysis:No lab results found.  Most Recent ESR & CRP:No lab results found.  Most Recent CPK:No lab results found.,   Results for orders placed or performed during the hospital encounter of 07/26/25   XR Chest Port 1 View    Narrative    EXAM: XR CHEST PORT 1 VIEW  LOCATION: [facility]  DATE: 7/26/2025    INDICATION: chest pressure  COMPARISON: 7/14/2025 CT chest.      Impression    IMPRESSION: No evidence for CHF or pneumonia. Normal heart size. No pleural effusion or pneumothorax. Stable right IJ Port-A-Cath. Stable stent in the distal esophagus.   XR Chest Port 1 View    Narrative    EXAM: XR CHEST PORT 1 VIEW  LOCATION: [facility]  DATE: 7/28/2025    INDICATION: acute respiratory failure  COMPARISON: Chest radiograph 7/26/2025. CT chest 1/18/2025      Impression    IMPRESSION: Slightly low lung volumes. No  focal consolidation, pleural effusion or pneumothorax. Similar right chest port. Similar stent near the gastroesophageal junction.   CTA CHEST ABDOMEN PELVIS WITH CONTRAST PANEL    Narrative    EXAM: CTA CHEST ABDOMEN PELVIS W CONTRAST  LOCATION: [facility]  DATE: 7/28/2025    INDICATION: Upper GIB, suspected 2 2 esophageal cancer with clot noted in distal esophagus past stent. Evaluate for active bleeding  COMPARISON: 7/14/025  TECHNIQUE: CT angiogram chest abdomen pelvis during arterial phase of injection of IV contrast. 2D and 3D MIP reconstructions were performed by the CT technologist. Dose reduction techniques were used.   CONTRAST: 119mL isovue 370    FINDINGS:   CT ANGIOGRAM CHEST, ABDOMEN, AND PELVIS: Redemonstration of an enhancing mass in the gastroesophageal junction, consistent with known malignancy. Unchanged position of the stent at the gastric esophageal junction. No evidence of active contrast   extravasation at the distal aspect of the stent. No active gastrointestinal hemorrhage.    Mildly dilated ascending thoracic aorta measuring 4.1 cm. No aortic aneurysm or dissection. The celiac artery, superior mesenteric artery, bilateral renal arteries, and inferior mesenteric arteries are patent without significant stenosis. Moderate amount   of calcified atheromatous plaque in the iliac arteries without significant focal stenosis in the common iliac, internal iliac and external iliac arteries.    LUNGS AND PLEURA: Mild atelectasis in the lung bases. Mild emphysema. No infiltrate or pleural effusion. Punctate calcified granuloma in the right middle lobe is stable. No new or enlarging nodules.    MEDIASTINUM/AXILLAE: Stable thyroid nodules. Right IJ port catheter. No filling defects in the central pulmonary arteries. The small filling defect in a right lower lobe segmental pulmonary arteries seen on the prior chest CT is not well seen today due   to timing of the contrast bolus.  GE junction mass as discussed above, similar in appearance to 4/17/2025. Unchanged position of the distal esophageal stent.    CORONARY ARTERY CALCIFICATION: Severe.    HEPATOBILIARY: Redemonstration of numerous hepatic metastases, stable since 4/17/2025 represent the lesions include a 3.1 cm lesion in the caudate lobe (series 9, image 113), previously measuring 3.0 cm and a 2.8 cm lesion in the central liver,   previously measuring 2.7 cm (9/105). Stable intrahepatic biliary ductal dilatation in the left hepatic lobe with likely due to obstruction by the central liver metastasis. Stable tumor thrombus occluding the left hepatic vein. Cholelithiasis.    PANCREAS: Punctate pancreatic parenchymal calcifications. No peripancreatic inflammatory changes or ductal dilatation.    SPLEEN: Normal.    ADRENAL GLANDS: Normal.    KIDNEYS/BLADDER: Stable solid enhancing masses in the kidneys bilaterally, with the largest enhancing mass in the right lobe measuring 2.1 cm (series 9, image 178) and in the left kidney measuring 0.9 cm. Additional bilateral renal cysts are also stable.   No calculi or hydronephrosis.    BOWEL: Large amount of formed stool in the rectum. Colonic diverticulosis. No small bowel or colonic obstruction or inflammatory changes. See discussion above regarding the GE junction mass. Normal appendix.    LYMPH NODES: Stable upper abdominal lymphadenopathy. For example, a cluster of enlarged gastrohepatic measuring 5.1 x 3.8 cm previously measured 5.3 x 3.3 cm, unchanged (9/118). No new or enlarging lymph nodes.    PELVIC ORGANS: No pelvic masses. Stable fat-containing small left inguinal hernia. No ascites.    MUSCULOSKELETAL: Degenerative changes in the spine. No suspicious lesions in the bones.      Impression    IMPRESSION:  1.  No active gastrointestinal hemorrhage.  2.  Stable appearance of the known GE junction mass with a stent in place.  3.  Stable hepatic metastases and upper abdominal  lymphadenopathy.  4.  Stable intrahepatic biliary ductal dilatation in the left hepatic lobe, likely due to obstruction by the central hepatic metastasis. Table tumor thrombus in the left hepatic vein.  5.  Stable enhancing bilateral renal masses, consistent with solid renal neoplasms such as oncocytoma or renal cell carcinomas.  6.  The small right lower lobe segmental pulmonary embolus seen on the prior chest CT is not well seen today due to timing of the contrast bolus.       Echocardiogram Complete     Value    LVEF  50-55%    [facility]    [id number]  [id number]  [id number]^MADIE^CAROLYN^SAL     [facility]  Echocardiography Laboratory  [address] [place], [place] [address]     Name: SRINIVAS WRIGHT  MRN: [id number]  : 1945  Study Date: 2025 12:58 PM  Age: 80 yrs  Gender: Male  Patient Location: [facility]  Reason For Study: Acute Myocardial Infarction  Ordering Physician: CAROLYN RAMACHANDRAN  Performed By: ROS Zhong     BSA: 2.0 m2  Height: 68 in  Weight: 193 lb  HR: 72  BP: 144/72 mmHg  ______________________________________________________________________________  Procedure  Echocardiogram with two-dimensional, color and spectral Doppler. Definity (NDC  #53824-266) given intravenously. Contrast Definity. Technically difficult  study.  ______________________________________________________________________________  Interpretation Summary     Left ventricular systolic function is low normal.  The visual ejection fraction is 50-55%.  Aortic root is measured 4.7cm, Asc Ao is 4.6cm Technically difficult,  suboptimal study. There is no comparison study available.  ______________________________________________________________________________  Left Ventricle  The left ventricle is normal in size. Left ventricular systolic function is  low normal. The visual ejection fraction is 50-55%. Diastolic Doppler findings  (E/E' ratio and/or other parameters) suggest left  ventricular filling  pressures are normal. No regional wall motion abnormalities noted.     Right Ventricle  The right ventricle is grossly normal size. Right ventricular function cannot  be assessed due to poor image quality.     Atria  The left atrium is severely dilated. Right atrium not well visualized. The  atrial septum is aneurysmal.     Mitral Valve  The mitral valve is normal in structure and function. There is trace mitral  regurgitation. There is no mitral valve stenosis.     Tricuspid Valve  The tricuspid valve is not well visualized. Right ventricular systolic  pressure could not be approximated due to inadequate tricuspid regurgitation.     Aortic Valve  The aortic valve is trileaflet. No aortic regurgitation is present. No  hemodynamically significant valvular aortic stenosis.     Pulmonic Valve  The pulmonic valve is not well visualized. Normal pulmonic valve velocity.     Vessels  The aortic Sinus(es) of Valsalva are moderately dilated. Ascending aorta  dilatation is present.     Pericardium  The pericardium is not well visualized.     ______________________________________________________________________________  MMode/2D Measurements & Calculations  IVSd: 1.0 cm  LVIDd: 4.5 cm  LVIDs: 2.9 cm  LVPWd: 1.0 cm  FS: 35.6 %     LV mass(C)d: 153.3 grams  LV mass(C)dI: 76.1 grams/m2  Ao root diam: 4.7 cm  asc Aorta Diam: 4.6 cm  LVOT diam: 2.3 cm  LVOT area: 4.2 cm2  Ao root diam index Ht(cm/m): 2.7  Ao root diam index BSA (cm/m2): 2.3  Asc Ao diam index BSA (cm/m2): 2.3  Asc Ao diam index Ht(cm/m): 2.7  EF Biplane: 42.5 %  LA Volume (BP): 71.0 ml     LA Volume Index (BP): 35.3 ml/m2  RWT: 0.44  TAPSE: 1.5 cm     Doppler Measurements & Calculations  MV E max moises: 39.1 cm/sec  MV A max moises: 62.1 cm/sec  MV E/A: 0.63  MV dec slope: 133.0 cm/sec2  MV dec time: 0.29 sec  PA acc time: 0.07 sec  E/E' av.8  Lateral E/e': 6.3  Medial E/e': 7.3      ______________________________________________________________________________  Report approved by: Danna Moffett MD on 07/27/2025 02:46 PM         Cardiac Catheterization    Narrative    Acute inferior ST elevation MI  Status post successful PCI with intravascular lithotripsy followed by 2   drug-eluting stent placements in the proximal and mid RCA         Discharge Medications      Review of your medicines        START taking        Dose / Directions   atorvastatin 80 MG tablet  Commonly known as: LIPITOR  Used for: ST elevation myocardial infarction involving right coronary artery (H)      Dose: 80 mg  Take 1 tablet (80 mg) by mouth every evening.  Quantity: 90 tablet  Refills: 0     metoprolol succinate ER 25 MG 24 hr tablet  Commonly known as: TOPROL XL  Used for: ST elevation myocardial infarction involving right coronary artery (H)      Dose: 25 mg  Start taking on: August 4, 2025  Take 1 tablet (25 mg) by mouth daily.  Quantity: 90 tablet  Refills: 0     pantoprazole 40 MG EC tablet  Commonly known as: PROTONIX  Used for: Gastrointestinal hemorrhage associated with gastric ulcer      Dose: 40 mg  Take 1 tablet (40 mg) by mouth 2 times daily (before meals).  Quantity: 120 tablet  Refills: 0     ticagrelor 90 MG tablet  Commonly known as: BRILINTA  Used for: ST elevation myocardial infarction involving right coronary artery (H)      Dose: 90 mg  Take 1 tablet (90 mg) by mouth 2 times daily. Dose to start this evening.  Quantity: 180 tablet  Refills: 3            CONTINUE these medicines which have NOT CHANGED        Dose / Directions   acetaminophen 325 MG tablet  Commonly known as: TYLENOL      Dose: 325-650 mg  Take 325-650 mg by mouth every 6 hours as needed for mild pain.  Refills: 0     apixaban ANTICOAGULANT 5 MG tablet  Commonly known as: ELIQUIS      Dose: 5 mg  Take 5 mg by mouth 2 times daily.  Refills: 0     lidocaine-prilocaine 2.5-2.5 % external cream  Commonly known as: EMLA      Apply  topically daily as needed for moderate pain. Port site  Refills: 0     ondansetron 8 MG tablet  Commonly known as: ZOFRAN      Dose: 8 mg  Take 8 mg by mouth every 8 hours as needed for nausea.  Refills: 0     prochlorperazine 5 MG tablet  Commonly known as: COMPAZINE      Dose: 5 mg  Take 5 mg by mouth every 6 hours as needed for nausea or vomiting.  Refills: 0            STOP taking      dexAMETHasone 4 MG tablet  Commonly known as: DECADRON                  Where to get your medicines        These medications were sent to [facility]  [facility]MOND [facility] - [address] [address]  [address] [address] [address]      Phone: [phone]   atorvastatin 80 MG tablet  metoprolol succinate ER 25 MG 24 hr tablet  pantoprazole 40 MG EC tablet  ticagrelor 90 MG tablet       Allergies   Allergies   Allergen Reactions    Aspirin Swelling     Pt reports throat swelling    Codeine Hives     Throat Swelling    Fish-Derived Products Hives    Hydrocodone Hives     Throat Swelling    Morphine Hives     Throat Swelling    Nsaids Hives     Throat Swelling    Oxycodone Hives     Throat Swelling

## 2025-08-03 NOTE — PLAN OF CARE
Goal Outcome Evaluation:      Plan of Care Reviewed With: patient    Overall Patient Progress: improvingOverall Patient Progress: improving  Neuro: A&Ox4  Tele/Cardiac: SR w/ frequent PVCS  Vitals:VSS on RA  Activity: Ax1, GB,walker  Pain: Denies pain  Drips/IV: Port a cath hep locked  GI/: WDL  Skin: Scattered bruising   Diet: Mechanical soft  Test/Procedures: NA  Plan: discharge today with home care

## 2025-08-03 NOTE — DISCHARGE SUMMARY
Discharge    Patient discharged to home via  with family   Care plan note    Listed belongings gathered and given to patient (including from security/pharmacy). Yes  Care Plan and Patient education resolved: Yes  Prescriptions if needed, hard copies sent with patient  Yes  Medication Bin checked and emptied on discharge Yes  SW/care coordinator/charge RN aware of discharge: Yes

## 2025-08-03 NOTE — PROGRESS NOTES
Patient is A/O x 4, Vss, a-febrile, denies pain, up with A1 GBW to bathroom, patient discharging to home this afternoon, Port de-accessed, patient showered, Spouse in the room, will need a walker to go with.

## 2025-08-03 NOTE — PROGRESS NOTES
Care Management Discharge Note    Discharge Date: 08/03/2025       Discharge Disposition: Home, Home Care    Discharge Services: Home Care    Discharge DME: Walker    Discharge Transportation: family or friend will provide    Private pay costs discussed: Not applicable    Does the patient's insurance plan have a 3 day qualifying hospital stay waiver?  No    PAS Confirmation Code:    Patient/family educated on Medicare website which has current facility and service quality ratings:      Education Provided on the Discharge Plan: Yes  Persons Notified of Discharge Plans: AVS updated / HHC  Patient/Family in Agreement with the Plan: yes    Handoff Referral Completed: No, handoff not indicated or clinically appropriate    Additional Information:  Noted discharge orders in place. Reviewed AVS.      Pt has followup for heart appointment clear :      Follow up with Carley Bueno at [facility] on Tuesday, August 26th at 10:15 AM at:    [facility]  [address] [facility] [address] [place] [address]  [phone]    *YOU HAVE BEEN PLACED ON A WAIT LIST TO TRY TO BE SEEN SOONER THAN ABOVE DATE.  YOU CAN ALSO CALL THE CLINIC TO CHECK IF ANY EARLIER DATES AVAILABLE.     Has home care orders in place with accepting [facility] agency: [facility].      Roseline Toussaint RN, BSN, PHN  [facility]  Inpatient Care Management - FLOAT  Please reach out via vocera or contact   CM RN Mobile cell phone number for unit    Medical 66: [phone] staffed 7:30am-4:00pm

## 2025-08-03 NOTE — PROGRESS NOTES
Occupational Therapy Discharge Summary    Reason for therapy discharge:    Discharge home with outpatient CR.    Progress towards therapy goal(s). See goals on Care Plan in [facility] electronic health record for goal details.  Goals partially met.  Barriers to achieving goals:   discharge from facility.    Therapy recommendation(s):    Continued therapy is recommended.  Rationale/Recommendations:  Recommend OP CR to meet medical needs and cardiac recovery as determined by medical specialists.

## 2025-08-03 NOTE — PLAN OF CARE
Goal Outcome Evaluation:  Summary:  STEMI and Upper GI bleed  DATE & TIME: 8/2/25  [id number]    Cognitive Concerns/ Orientation : A&OX4   BEHAVIOR & AGGRESSION TOOL COLOR: Green  CIWA SCORE: NA   ABNL VS/O2: VSS@RA  MOBILITY: Assist 1 GBW  PAIN MANAGMENT: Denied  DIET: Mech soft and thin liquid. Meds whole.   BOWEL/BLADDER: Continent to BR  ABNL LAB/BG: Hgn Q12 checks, last one 9.4@1800, AM recheck  DRAIN/DEVICES: Right chest port. Positive blood return and hep locked. Lab collect.   TELEMETRY RHYTHM: NS  SKIN: Intact, scattered bruise.   TESTS/PROCEDURES: NA  D/C DAY/GOALS/PLACE: Possible discharge tomorrow to home with HC  OTHER IMPORTANT INFO: Pt and family able to make needs known.

## 2025-08-13 ENCOUNTER — HOSPITAL ENCOUNTER (OUTPATIENT)
Dept: CARDIAC REHAB | Facility: CLINIC | Age: 80
Discharge: HOME | End: 2025-08-13
Attending: INTERNAL MEDICINE
Payer: COMMERCIAL

## 2025-08-13 DIAGNOSIS — I21.11 ST ELEVATION MYOCARDIAL INFARCTION INVOLVING RIGHT CORONARY ARTERY (H): ICD-10-CM

## 2025-08-13 PROCEDURE — 93798 PHYS/QHP OP CAR RHAB W/ECG: CPT | Performed by: REHABILITATION PRACTITIONER

## 2025-08-15 ENCOUNTER — HOSPITAL ENCOUNTER (INPATIENT)
Facility: CLINIC | Age: 80
LOS: 4 days | Discharge: HOME | DRG: 811 | End: 2025-08-20
Attending: EMERGENCY MEDICINE | Admitting: INTERNAL MEDICINE
Payer: COMMERCIAL

## 2025-08-15 ENCOUNTER — APPOINTMENT (OUTPATIENT)
Dept: GENERAL RADIOLOGY | Facility: CLINIC | Age: 80
DRG: 811 | End: 2025-08-15
Attending: EMERGENCY MEDICINE
Payer: COMMERCIAL

## 2025-08-15 ENCOUNTER — HOSPITAL ENCOUNTER (OUTPATIENT)
Dept: CARDIAC REHAB | Facility: CLINIC | Age: 80
Discharge: HOME | End: 2025-08-15
Attending: INTERNAL MEDICINE
Payer: COMMERCIAL

## 2025-08-15 ENCOUNTER — APPOINTMENT (OUTPATIENT)
Dept: CT IMAGING | Facility: CLINIC | Age: 80
DRG: 811 | End: 2025-08-15
Attending: EMERGENCY MEDICINE
Payer: COMMERCIAL

## 2025-08-15 DIAGNOSIS — I47.19 ATRIAL TACHYCARDIA: ICD-10-CM

## 2025-08-15 DIAGNOSIS — D62 ACUTE BLOOD LOSS ANEMIA: ICD-10-CM

## 2025-08-15 DIAGNOSIS — D64.9 ANEMIA, UNSPECIFIED TYPE: ICD-10-CM

## 2025-08-15 DIAGNOSIS — Z95.5 HISTORY OF CORONARY ARTERY STENT PLACEMENT: ICD-10-CM

## 2025-08-15 DIAGNOSIS — A41.9 SEPSIS WITHOUT ACUTE ORGAN DYSFUNCTION, DUE TO UNSPECIFIED ORGANISM (H): ICD-10-CM

## 2025-08-15 DIAGNOSIS — C79.9 METASTATIC MALIGNANT NEOPLASM, UNSPECIFIED SITE (H): ICD-10-CM

## 2025-08-15 DIAGNOSIS — I95.9 HYPOTENSION, UNSPECIFIED HYPOTENSION TYPE: Primary | ICD-10-CM

## 2025-08-15 DIAGNOSIS — C15.9 MALIGNANT NEOPLASM OF ESOPHAGUS, UNSPECIFIED LOCATION (H): ICD-10-CM

## 2025-08-15 LAB
ABO + RH BLD: NORMAL
ALBUMIN SERPL BCG-MCNC: 2.4 G/DL (ref 3.5–5.2)
ALP SERPL-CCNC: 303 U/L (ref 40–150)
ALT SERPL W P-5'-P-CCNC: 31 U/L (ref 0–70)
ANION GAP SERPL CALCULATED.3IONS-SCNC: 12 MMOL/L (ref 7–15)
AST SERPL W P-5'-P-CCNC: 46 U/L (ref 0–45)
BASE EXCESS BLDV CALC-SCNC: -3 MMOL/L (ref -3–3)
BASE EXCESS BLDV CALC-SCNC: -6 MMOL/L (ref -3–3)
BASOPHILS # BLD AUTO: 0.05 10E3/UL (ref 0–0.2)
BASOPHILS NFR BLD AUTO: 0.6 %
BILIRUB SERPL-MCNC: 0.3 MG/DL
BLD GP AB SCN SERPL QL: NEGATIVE
BLD PROD TYP BPU: NORMAL
BLOOD COMPONENT TYPE: NORMAL
BUN SERPL-MCNC: 18.6 MG/DL (ref 8–23)
CALCIUM SERPL-MCNC: 8 MG/DL (ref 8.8–10.4)
CHLORIDE SERPL-SCNC: 106 MMOL/L (ref 98–107)
CODING SYSTEM: NORMAL
CREAT SERPL-MCNC: 0.94 MG/DL (ref 0.67–1.17)
CROSSMATCH: NORMAL
EGFRCR SERPLBLD CKD-EPI 2021: 81 ML/MIN/1.73M2
EOSINOPHIL # BLD AUTO: 0.08 10E3/UL (ref 0–0.7)
EOSINOPHIL NFR BLD AUTO: 1 %
ERYTHROCYTE [DISTWIDTH] IN BLOOD BY AUTOMATED COUNT: 17.8 % (ref 10–15)
GLUCOSE SERPL-MCNC: 126 MG/DL (ref 70–99)
HCO3 BLDV-SCNC: 18 MMOL/L (ref 21–28)
HCO3 BLDV-SCNC: 21 MMOL/L (ref 21–28)
HCO3 SERPL-SCNC: 18 MMOL/L (ref 22–29)
HCT VFR BLD AUTO: 23.3 % (ref 40–53)
HGB BLD-MCNC: 7.4 G/DL (ref 13.3–17.7)
HOLD SPECIMEN: NORMAL
HOLD SPECIMEN: NORMAL
IMM GRANULOCYTES # BLD: 0.24 10E3/UL
IMM GRANULOCYTES NFR BLD: 2.9 %
ISSUE DATE AND TIME: NORMAL
LACTATE BLD-SCNC: 2.4 MMOL/L (ref 0.7–2)
LACTATE BLD-SCNC: 3 MMOL/L (ref 0.7–2)
LACTATE SERPL-SCNC: 2.9 MMOL/L (ref 0.7–2)
LYMPHOCYTES # BLD AUTO: 0.66 10E3/UL (ref 0.8–5.3)
LYMPHOCYTES NFR BLD AUTO: 8 %
MCH RBC QN AUTO: 26.1 PG (ref 26.5–33)
MCHC RBC AUTO-ENTMCNC: 31.8 G/DL (ref 31.5–36.5)
MCV RBC AUTO: 82.3 FL (ref 78–100)
MONOCYTES # BLD AUTO: 0.96 10E3/UL (ref 0–1.3)
MONOCYTES NFR BLD AUTO: 11.6 %
NEUTROPHILS # BLD AUTO: 6.28 10E3/UL (ref 1.6–8.3)
NEUTROPHILS NFR BLD AUTO: 75.9 %
NRBC # BLD AUTO: <0.03 10E3/UL
NRBC BLD AUTO-RTO: 0 /100
NT-PROBNP SERPL-MCNC: 858 PG/ML (ref 0–852)
PCO2 BLDV: 31 MM HG (ref 40–50)
PCO2 BLDV: 35 MM HG (ref 40–50)
PH BLDV: 7.38 [PH] (ref 7.32–7.43)
PH BLDV: 7.4 [PH] (ref 7.32–7.43)
PLATELET # BLD AUTO: 225 10E3/UL (ref 150–450)
PO2 BLDV: 15 MM HG (ref 25–47)
PO2 BLDV: 25 MM HG (ref 25–47)
POTASSIUM SERPL-SCNC: 3.8 MMOL/L (ref 3.4–5.3)
PROT SERPL-MCNC: 4.6 G/DL (ref 6.4–8.3)
RBC # BLD AUTO: 2.83 10E6/UL (ref 4.4–5.9)
SAO2 % BLDV: 19 % (ref 70–75)
SAO2 % BLDV: 44 % (ref 70–75)
SODIUM SERPL-SCNC: 136 MMOL/L (ref 135–145)
SPECIMEN EXP DATE BLD: NORMAL
TROPONIN T SERPL HS-MCNC: 40 NG/L
TROPONIN T SERPL HS-MCNC: 46 NG/L
UNIT ABO/RH: NORMAL
UNIT NUMBER: NORMAL
UNIT STATUS: NORMAL
UNIT TYPE ISBT: 5100
WBC # BLD AUTO: 8.27 10E3/UL (ref 4–11)

## 2025-08-15 PROCEDURE — 86923 COMPATIBILITY TEST ELECTRIC: CPT | Performed by: HOSPITALIST

## 2025-08-15 PROCEDURE — P9016 RBC LEUKOCYTES REDUCED: HCPCS | Performed by: EMERGENCY MEDICINE

## 2025-08-15 PROCEDURE — 82803 BLOOD GASES ANY COMBINATION: CPT

## 2025-08-15 PROCEDURE — 86901 BLOOD TYPING SEROLOGIC RH(D): CPT | Performed by: EMERGENCY MEDICINE

## 2025-08-15 PROCEDURE — 250N000011 HC RX IP 250 OP 636: Performed by: EMERGENCY MEDICINE

## 2025-08-15 PROCEDURE — 74177 CT ABD & PELVIS W/CONTRAST: CPT

## 2025-08-15 PROCEDURE — 83605 ASSAY OF LACTIC ACID: CPT

## 2025-08-15 PROCEDURE — 93798 PHYS/QHP OP CAR RHAB W/ECG: CPT | Performed by: CLINICAL EXERCISE PHYSIOLOGIST

## 2025-08-15 PROCEDURE — 99291 CRITICAL CARE FIRST HOUR: CPT | Mod: 25 | Performed by: EMERGENCY MEDICINE

## 2025-08-15 PROCEDURE — 258N000003 HC RX IP 258 OP 636: Performed by: EMERGENCY MEDICINE

## 2025-08-15 PROCEDURE — 71045 X-RAY EXAM CHEST 1 VIEW: CPT

## 2025-08-15 PROCEDURE — 80053 COMPREHEN METABOLIC PANEL: CPT | Performed by: EMERGENCY MEDICINE

## 2025-08-15 PROCEDURE — 86923 COMPATIBILITY TEST ELECTRIC: CPT | Performed by: EMERGENCY MEDICINE

## 2025-08-15 PROCEDURE — 36415 COLL VENOUS BLD VENIPUNCTURE: CPT | Performed by: EMERGENCY MEDICINE

## 2025-08-15 PROCEDURE — G0378 HOSPITAL OBSERVATION PER HR: HCPCS

## 2025-08-15 PROCEDURE — 250N000009 HC RX 250: Performed by: EMERGENCY MEDICINE

## 2025-08-15 PROCEDURE — 250N000013 HC RX MED GY IP 250 OP 250 PS 637: Performed by: EMERGENCY MEDICINE

## 2025-08-15 PROCEDURE — 83880 ASSAY OF NATRIURETIC PEPTIDE: CPT | Performed by: EMERGENCY MEDICINE

## 2025-08-15 PROCEDURE — 84484 ASSAY OF TROPONIN QUANT: CPT | Performed by: EMERGENCY MEDICINE

## 2025-08-15 PROCEDURE — 85004 AUTOMATED DIFF WBC COUNT: CPT | Performed by: EMERGENCY MEDICINE

## 2025-08-15 PROCEDURE — 87040 BLOOD CULTURE FOR BACTERIA: CPT | Performed by: EMERGENCY MEDICINE

## 2025-08-15 RX ORDER — PIPERACILLIN SODIUM, TAZOBACTAM SODIUM 4; .5 G/20ML; G/20ML
4.5 INJECTION, POWDER, LYOPHILIZED, FOR SOLUTION INTRAVENOUS ONCE
Status: COMPLETED | OUTPATIENT
Start: 2025-08-15 | End: 2025-08-15

## 2025-08-15 RX ORDER — DEXAMETHASONE 4 MG/1
8 TABLET ORAL
COMMUNITY

## 2025-08-15 RX ORDER — ACETAMINOPHEN 500 MG
1000 TABLET ORAL ONCE
Status: COMPLETED | OUTPATIENT
Start: 2025-08-15 | End: 2025-08-15

## 2025-08-15 RX ORDER — NITROGLYCERIN 0.4 MG/1
0.4 TABLET SUBLINGUAL EVERY 5 MIN PRN
COMMUNITY

## 2025-08-15 RX ORDER — IOPAMIDOL 755 MG/ML
90 INJECTION, SOLUTION INTRAVASCULAR ONCE
Status: COMPLETED | OUTPATIENT
Start: 2025-08-15 | End: 2025-08-15

## 2025-08-15 RX ADMIN — SODIUM CHLORIDE 1000 ML: 0.9 INJECTION, SOLUTION INTRAVENOUS at 19:53

## 2025-08-15 RX ADMIN — PIPERACILLIN AND TAZOBACTAM 4.5 G: 4; .5 INJECTION, POWDER, FOR SOLUTION INTRAVENOUS at 21:39

## 2025-08-15 RX ADMIN — ACETAMINOPHEN 1000 MG: 500 TABLET, FILM COATED ORAL at 22:51

## 2025-08-15 RX ADMIN — IOPAMIDOL 90 ML: 755 INJECTION, SOLUTION INTRAVENOUS at 21:17

## 2025-08-15 RX ADMIN — PANTOPRAZOLE SODIUM 80 MG: 40 INJECTION, POWDER, FOR SOLUTION INTRAVENOUS at 21:39

## 2025-08-15 RX ADMIN — SODIUM CHLORIDE 500 ML: 0.9 INJECTION, SOLUTION INTRAVENOUS at 21:10

## 2025-08-15 RX ADMIN — SODIUM CHLORIDE 67 ML: 9 INJECTION, SOLUTION INTRAVENOUS at 21:18

## 2025-08-15 ASSESSMENT — ACTIVITIES OF DAILY LIVING (ADL)
ADLS_ACUITY_SCORE: 59

## 2025-08-16 PROBLEM — D62 ACUTE BLOOD LOSS ANEMIA: Status: ACTIVE | Noted: 2025-08-16

## 2025-08-16 LAB
ANION GAP SERPL CALCULATED.3IONS-SCNC: 12 MMOL/L (ref 7–15)
ATRIAL RATE - MUSE: 102 BPM
BUN SERPL-MCNC: 31.9 MG/DL (ref 8–23)
CALCIUM SERPL-MCNC: 8.5 MG/DL (ref 8.8–10.4)
CHLORIDE SERPL-SCNC: 107 MMOL/L (ref 98–107)
CREAT SERPL-MCNC: 0.77 MG/DL (ref 0.67–1.17)
DIASTOLIC BLOOD PRESSURE - MUSE: NORMAL MMHG
EGFRCR SERPLBLD CKD-EPI 2021: 90 ML/MIN/1.73M2
GLUCOSE BLDC GLUCOMTR-MCNC: 99 MG/DL (ref 70–99)
GLUCOSE SERPL-MCNC: 98 MG/DL (ref 70–99)
HCO3 SERPL-SCNC: 17 MMOL/L (ref 22–29)
HGB BLD-MCNC: 7.6 G/DL (ref 13.3–17.7)
HGB BLD-MCNC: 7.8 G/DL (ref 13.3–17.7)
HGB BLD-MCNC: 7.9 G/DL (ref 13.3–17.7)
HGB BLD-MCNC: 7.9 G/DL (ref 13.3–17.7)
HGB BLD-MCNC: 8 G/DL (ref 13.3–17.7)
INTERPRETATION ECG - MUSE: NORMAL
LACTATE SERPL-SCNC: 2 MMOL/L (ref 0.7–2)
LACTATE SERPL-SCNC: 2.4 MMOL/L (ref 0.7–2)
LACTATE SERPL-SCNC: 2.8 MMOL/L (ref 0.7–2)
MCV RBC AUTO: 80.5 FL (ref 78–100)
MCV RBC AUTO: 81.5 FL (ref 78–100)
MCV RBC AUTO: 81.6 FL (ref 78–100)
MCV RBC AUTO: 82.5 FL (ref 78–100)
MCV RBC AUTO: 82.7 FL (ref 78–100)
P AXIS - MUSE: 10 DEGREES
POTASSIUM SERPL-SCNC: 4.1 MMOL/L (ref 3.4–5.3)
PR INTERVAL - MUSE: 176 MS
QRS DURATION - MUSE: 92 MS
QT - MUSE: 384 MS
QTC - MUSE: 500 MS
R AXIS - MUSE: 2 DEGREES
SODIUM SERPL-SCNC: 136 MMOL/L (ref 135–145)
SYSTOLIC BLOOD PRESSURE - MUSE: NORMAL MMHG
T AXIS - MUSE: 27 DEGREES
VENTRICULAR RATE- MUSE: 102 BPM

## 2025-08-16 PROCEDURE — 99207 PR NO BILLABLE SERVICE THIS VISIT: CPT | Performed by: HOSPITALIST

## 2025-08-16 PROCEDURE — 99418 PROLNG IP/OBS E/M EA 15 MIN: CPT | Performed by: INTERNAL MEDICINE

## 2025-08-16 PROCEDURE — 36415 COLL VENOUS BLD VENIPUNCTURE: CPT | Performed by: HOSPITALIST

## 2025-08-16 PROCEDURE — 258N000003 HC RX IP 258 OP 636: Performed by: INTERNAL MEDICINE

## 2025-08-16 PROCEDURE — 85018 HEMOGLOBIN: CPT | Performed by: INTERNAL MEDICINE

## 2025-08-16 PROCEDURE — 85018 HEMOGLOBIN: CPT | Performed by: HOSPITALIST

## 2025-08-16 PROCEDURE — 120N000013 HC R&B IMCU

## 2025-08-16 PROCEDURE — 250N000013 HC RX MED GY IP 250 OP 250 PS 637: Performed by: INTERNAL MEDICINE

## 2025-08-16 PROCEDURE — 83605 ASSAY OF LACTIC ACID: CPT | Performed by: HOSPITALIST

## 2025-08-16 PROCEDURE — 36415 COLL VENOUS BLD VENIPUNCTURE: CPT

## 2025-08-16 PROCEDURE — 99222 1ST HOSP IP/OBS MODERATE 55: CPT | Performed by: INTERNAL MEDICINE

## 2025-08-16 PROCEDURE — 83605 ASSAY OF LACTIC ACID: CPT

## 2025-08-16 PROCEDURE — 99223 1ST HOSP IP/OBS HIGH 75: CPT | Performed by: INTERNAL MEDICINE

## 2025-08-16 PROCEDURE — 80048 BASIC METABOLIC PNL TOTAL CA: CPT | Performed by: HOSPITALIST

## 2025-08-16 PROCEDURE — 3E043XZ INTRODUCTION OF VASOPRESSOR INTO CENTRAL VEIN, PERCUTANEOUS APPROACH: ICD-10-PCS | Performed by: INTERNAL MEDICINE

## 2025-08-16 RX ORDER — PROCHLORPERAZINE MALEATE 5 MG/1
5 TABLET ORAL EVERY 6 HOURS PRN
Status: DISCONTINUED | OUTPATIENT
Start: 2025-08-16 | End: 2025-08-20 | Stop reason: HOSPADM

## 2025-08-16 RX ORDER — PROCHLORPERAZINE 25 MG
12.5 SUPPOSITORY, RECTAL RECTAL EVERY 12 HOURS PRN
Status: DISCONTINUED | OUTPATIENT
Start: 2025-08-16 | End: 2025-08-20 | Stop reason: HOSPADM

## 2025-08-16 RX ORDER — SODIUM CHLORIDE, SODIUM LACTATE, POTASSIUM CHLORIDE, CALCIUM CHLORIDE 600; 310; 30; 20 MG/100ML; MG/100ML; MG/100ML; MG/100ML
INJECTION, SOLUTION INTRAVENOUS CONTINUOUS
Status: DISCONTINUED | OUTPATIENT
Start: 2025-08-16 | End: 2025-08-17

## 2025-08-16 RX ORDER — LIDOCAINE 4 G/G
1 PATCH TOPICAL EVERY EVENING
Status: DISCONTINUED | OUTPATIENT
Start: 2025-08-16 | End: 2025-08-20 | Stop reason: HOSPADM

## 2025-08-16 RX ORDER — ONDANSETRON 2 MG/ML
4 INJECTION INTRAMUSCULAR; INTRAVENOUS EVERY 6 HOURS PRN
Status: DISCONTINUED | OUTPATIENT
Start: 2025-08-16 | End: 2025-08-16

## 2025-08-16 RX ORDER — ATORVASTATIN CALCIUM 40 MG/1
80 TABLET, FILM COATED ORAL EVERY EVENING
Status: DISCONTINUED | OUTPATIENT
Start: 2025-08-16 | End: 2025-08-20 | Stop reason: HOSPADM

## 2025-08-16 RX ORDER — AMOXICILLIN 250 MG
2 CAPSULE ORAL 2 TIMES DAILY PRN
Status: DISCONTINUED | OUTPATIENT
Start: 2025-08-16 | End: 2025-08-20 | Stop reason: HOSPADM

## 2025-08-16 RX ORDER — LIDOCAINE 40 MG/G
CREAM TOPICAL
Status: DISCONTINUED | OUTPATIENT
Start: 2025-08-16 | End: 2025-08-20 | Stop reason: HOSPADM

## 2025-08-16 RX ORDER — ONDANSETRON 4 MG/1
4 TABLET, ORALLY DISINTEGRATING ORAL EVERY 6 HOURS PRN
Status: DISCONTINUED | OUTPATIENT
Start: 2025-08-16 | End: 2025-08-16

## 2025-08-16 RX ORDER — METOPROLOL SUCCINATE 25 MG/1
25 TABLET, EXTENDED RELEASE ORAL DAILY
Status: DISCONTINUED | OUTPATIENT
Start: 2025-08-16 | End: 2025-08-17

## 2025-08-16 RX ORDER — ACETAMINOPHEN 500 MG
500-1000 TABLET ORAL EVERY 6 HOURS PRN
Status: DISCONTINUED | OUTPATIENT
Start: 2025-08-16 | End: 2025-08-18

## 2025-08-16 RX ORDER — AMOXICILLIN 250 MG
1 CAPSULE ORAL 2 TIMES DAILY PRN
Status: DISCONTINUED | OUTPATIENT
Start: 2025-08-16 | End: 2025-08-20 | Stop reason: HOSPADM

## 2025-08-16 RX ORDER — ACETAMINOPHEN 650 MG/1
650 SUPPOSITORY RECTAL EVERY 4 HOURS PRN
Status: DISCONTINUED | OUTPATIENT
Start: 2025-08-16 | End: 2025-08-20 | Stop reason: HOSPADM

## 2025-08-16 RX ORDER — TICAGRELOR 90 MG/1
90 TABLET, FILM COATED ORAL 2 TIMES DAILY
Status: DISCONTINUED | OUTPATIENT
Start: 2025-08-16 | End: 2025-08-20 | Stop reason: HOSPADM

## 2025-08-16 RX ORDER — ACETAMINOPHEN 325 MG/1
650 TABLET ORAL EVERY 4 HOURS PRN
Status: DISCONTINUED | OUTPATIENT
Start: 2025-08-16 | End: 2025-08-20 | Stop reason: HOSPADM

## 2025-08-16 RX ORDER — LIDOCAINE 40 MG/G
CREAM TOPICAL
Status: DISCONTINUED | OUTPATIENT
Start: 2025-08-16 | End: 2025-08-18

## 2025-08-16 RX ORDER — PANTOPRAZOLE SODIUM 40 MG/1
40 TABLET, DELAYED RELEASE ORAL
Status: DISCONTINUED | OUTPATIENT
Start: 2025-08-16 | End: 2025-08-20 | Stop reason: HOSPADM

## 2025-08-16 RX ORDER — CALCIUM CARBONATE 500 MG/1
1000 TABLET, CHEWABLE ORAL 4 TIMES DAILY PRN
Status: DISCONTINUED | OUTPATIENT
Start: 2025-08-16 | End: 2025-08-20 | Stop reason: HOSPADM

## 2025-08-16 RX ADMIN — SODIUM CHLORIDE, SODIUM LACTATE, POTASSIUM CHLORIDE, AND CALCIUM CHLORIDE: .6; .31; .03; .02 INJECTION, SOLUTION INTRAVENOUS at 02:36

## 2025-08-16 RX ADMIN — TICAGRELOR 90 MG: 90 TABLET, FILM COATED ORAL at 20:59

## 2025-08-16 RX ADMIN — LIDOCAINE 1 PATCH: 4 PATCH TOPICAL at 02:33

## 2025-08-16 RX ADMIN — TICAGRELOR 90 MG: 90 TABLET, FILM COATED ORAL at 08:30

## 2025-08-16 RX ADMIN — LIDOCAINE 1 PATCH: 4 PATCH TOPICAL at 20:59

## 2025-08-16 RX ADMIN — ATORVASTATIN CALCIUM 80 MG: 40 TABLET, FILM COATED ORAL at 20:59

## 2025-08-16 RX ADMIN — PANTOPRAZOLE SODIUM 40 MG: 40 TABLET, DELAYED RELEASE ORAL at 16:41

## 2025-08-16 RX ADMIN — PANTOPRAZOLE SODIUM 40 MG: 40 TABLET, DELAYED RELEASE ORAL at 06:11

## 2025-08-16 ASSESSMENT — ACTIVITIES OF DAILY LIVING (ADL)
ADLS_ACUITY_SCORE: 61
ADLS_ACUITY_SCORE: 63
ADLS_ACUITY_SCORE: 59
ADLS_ACUITY_SCORE: 61
ADLS_ACUITY_SCORE: 59
ADLS_ACUITY_SCORE: 63
ADLS_ACUITY_SCORE: 66
ADLS_ACUITY_SCORE: 63
ADLS_ACUITY_SCORE: 63
ADLS_ACUITY_SCORE: 66
ADLS_ACUITY_SCORE: 59
ADLS_ACUITY_SCORE: 59
ADLS_ACUITY_SCORE: 63
ADLS_ACUITY_SCORE: 66
ADLS_ACUITY_SCORE: 63
ADLS_ACUITY_SCORE: 59
ADLS_ACUITY_SCORE: 63
ADLS_ACUITY_SCORE: 63

## 2025-08-17 ENCOUNTER — APPOINTMENT (OUTPATIENT)
Dept: PHYSICAL THERAPY | Facility: CLINIC | Age: 80
DRG: 811 | End: 2025-08-17
Attending: INTERNAL MEDICINE
Payer: COMMERCIAL

## 2025-08-17 LAB
BLD PROD TYP BPU: NORMAL
BLOOD COMPONENT TYPE: NORMAL
CODING SYSTEM: NORMAL
CROSSMATCH: NORMAL
HGB BLD-MCNC: 7.5 G/DL (ref 13.3–17.7)
HGB BLD-MCNC: 8.5 G/DL (ref 13.3–17.7)
ISSUE DATE AND TIME: NORMAL
MCV RBC AUTO: 82.7 FL (ref 78–100)
MCV RBC AUTO: 83.3 FL (ref 78–100)
UNIT ABO/RH: NORMAL
UNIT NUMBER: NORMAL
UNIT STATUS: NORMAL
UNIT TYPE ISBT: 5100

## 2025-08-17 PROCEDURE — 97162 PT EVAL MOD COMPLEX 30 MIN: CPT | Mod: GP

## 2025-08-17 PROCEDURE — 99233 SBSQ HOSP IP/OBS HIGH 50: CPT | Performed by: HOSPITALIST

## 2025-08-17 PROCEDURE — 120N000001 HC R&B MED SURG/OB

## 2025-08-17 PROCEDURE — P9016 RBC LEUKOCYTES REDUCED: HCPCS | Performed by: HOSPITALIST

## 2025-08-17 PROCEDURE — 99232 SBSQ HOSP IP/OBS MODERATE 35: CPT | Performed by: INTERNAL MEDICINE

## 2025-08-17 PROCEDURE — 97530 THERAPEUTIC ACTIVITIES: CPT | Mod: GP

## 2025-08-17 PROCEDURE — 97110 THERAPEUTIC EXERCISES: CPT | Mod: GP

## 2025-08-17 PROCEDURE — 85018 HEMOGLOBIN: CPT | Performed by: INTERNAL MEDICINE

## 2025-08-17 PROCEDURE — 250N000013 HC RX MED GY IP 250 OP 250 PS 637: Performed by: INTERNAL MEDICINE

## 2025-08-17 PROCEDURE — 85018 HEMOGLOBIN: CPT | Performed by: HOSPITALIST

## 2025-08-17 PROCEDURE — 250N000013 HC RX MED GY IP 250 OP 250 PS 637: Performed by: HOSPITALIST

## 2025-08-17 RX ORDER — HEPARIN SODIUM (PORCINE) LOCK FLUSH IV SOLN 100 UNIT/ML 100 UNIT/ML
5-10 SOLUTION INTRAVENOUS
Status: DISCONTINUED | OUTPATIENT
Start: 2025-08-18 | End: 2025-08-20 | Stop reason: HOSPADM

## 2025-08-17 RX ORDER — HEPARIN SODIUM,PORCINE 10 UNIT/ML
5-10 VIAL (ML) INTRAVENOUS EVERY 24 HOURS
Status: DISCONTINUED | OUTPATIENT
Start: 2025-08-18 | End: 2025-08-20 | Stop reason: HOSPADM

## 2025-08-17 RX ORDER — HEPARIN SODIUM,PORCINE 10 UNIT/ML
5-10 VIAL (ML) INTRAVENOUS
Status: DISCONTINUED | OUTPATIENT
Start: 2025-08-17 | End: 2025-08-20 | Stop reason: HOSPADM

## 2025-08-17 RX ADMIN — PANTOPRAZOLE SODIUM 40 MG: 40 TABLET, DELAYED RELEASE ORAL at 16:30

## 2025-08-17 RX ADMIN — PANTOPRAZOLE SODIUM 40 MG: 40 TABLET, DELAYED RELEASE ORAL at 06:48

## 2025-08-17 RX ADMIN — ATORVASTATIN CALCIUM 80 MG: 40 TABLET, FILM COATED ORAL at 20:24

## 2025-08-17 RX ADMIN — ACETAMINOPHEN 1000 MG: 500 TABLET, FILM COATED ORAL at 08:10

## 2025-08-17 RX ADMIN — TICAGRELOR 90 MG: 90 TABLET, FILM COATED ORAL at 20:24

## 2025-08-17 RX ADMIN — METOPROLOL SUCCINATE 12.5 MG: 25 TABLET, EXTENDED RELEASE ORAL at 16:30

## 2025-08-17 RX ADMIN — TICAGRELOR 90 MG: 90 TABLET, FILM COATED ORAL at 08:06

## 2025-08-17 ASSESSMENT — ACTIVITIES OF DAILY LIVING (ADL)
ADLS_ACUITY_SCORE: 63
ADLS_ACUITY_SCORE: 66
ADLS_ACUITY_SCORE: 63
ADLS_ACUITY_SCORE: 66
ADLS_ACUITY_SCORE: 63
ADLS_ACUITY_SCORE: 63
ADLS_ACUITY_SCORE: 66

## 2025-08-18 ENCOUNTER — APPOINTMENT (OUTPATIENT)
Dept: INTERVENTIONAL RADIOLOGY/VASCULAR | Facility: CLINIC | Age: 80
DRG: 811 | End: 2025-08-18
Attending: PHYSICIAN ASSISTANT
Payer: COMMERCIAL

## 2025-08-18 ENCOUNTER — APPOINTMENT (OUTPATIENT)
Dept: CT IMAGING | Facility: CLINIC | Age: 80
DRG: 811 | End: 2025-08-18
Payer: COMMERCIAL

## 2025-08-18 LAB
ALBUMIN SERPL BCG-MCNC: 2.7 G/DL (ref 3.5–5.2)
ALP SERPL-CCNC: 355 U/L (ref 40–150)
ALT SERPL W P-5'-P-CCNC: 45 U/L (ref 0–70)
ANION GAP SERPL CALCULATED.3IONS-SCNC: 11 MMOL/L (ref 7–15)
AST SERPL W P-5'-P-CCNC: 76 U/L (ref 0–45)
BASOPHILS # BLD AUTO: 0.05 10E3/UL (ref 0–0.2)
BASOPHILS NFR BLD AUTO: 0.3 %
BILIRUB SERPL-MCNC: 0.7 MG/DL
BUN SERPL-MCNC: 16.8 MG/DL (ref 8–23)
CALCIUM SERPL-MCNC: 8.8 MG/DL (ref 8.8–10.4)
CHLORIDE SERPL-SCNC: 108 MMOL/L (ref 98–107)
CREAT SERPL-MCNC: 0.84 MG/DL (ref 0.67–1.17)
EGFRCR SERPLBLD CKD-EPI 2021: 88 ML/MIN/1.73M2
EOSINOPHIL # BLD AUTO: 0.12 10E3/UL (ref 0–0.7)
EOSINOPHIL NFR BLD AUTO: 0.8 %
ERYTHROCYTE [DISTWIDTH] IN BLOOD BY AUTOMATED COUNT: 18.7 % (ref 10–15)
GLUCOSE BLDC GLUCOMTR-MCNC: 110 MG/DL (ref 70–99)
GLUCOSE SERPL-MCNC: 90 MG/DL (ref 70–99)
HCO3 SERPL-SCNC: 20 MMOL/L (ref 22–29)
HCT VFR BLD AUTO: 31.2 % (ref 40–53)
HGB BLD-MCNC: 8.2 G/DL (ref 13.3–17.7)
HGB BLD-MCNC: 8.7 G/DL (ref 13.3–17.7)
HGB BLD-MCNC: 9.8 G/DL (ref 13.3–17.7)
IMM GRANULOCYTES # BLD: 0.45 10E3/UL
IMM GRANULOCYTES NFR BLD: 3.1 %
LACTATE SERPL-SCNC: 1.2 MMOL/L (ref 0.7–2)
LACTATE SERPL-SCNC: 2.9 MMOL/L (ref 0.7–2)
LACTATE SERPL-SCNC: 6.8 MMOL/L (ref 0.7–2)
LYMPHOCYTES # BLD AUTO: 0.64 10E3/UL (ref 0.8–5.3)
LYMPHOCYTES NFR BLD AUTO: 4.4 %
MCH RBC QN AUTO: 27 PG (ref 26.5–33)
MCHC RBC AUTO-ENTMCNC: 31.4 G/DL (ref 31.5–36.5)
MCV RBC AUTO: 83.8 FL (ref 78–100)
MCV RBC AUTO: 83.9 FL (ref 78–100)
MCV RBC AUTO: 86 FL (ref 78–100)
MONOCYTES # BLD AUTO: 0.21 10E3/UL (ref 0–1.3)
MONOCYTES NFR BLD AUTO: 1.4 %
NEUTROPHILS # BLD AUTO: 13.11 10E3/UL (ref 1.6–8.3)
NEUTROPHILS NFR BLD AUTO: 90 %
NRBC # BLD AUTO: <0.03 10E3/UL
NRBC BLD AUTO-RTO: 0 /100
PLATELET # BLD AUTO: 222 10E3/UL (ref 150–450)
POTASSIUM SERPL-SCNC: 3.8 MMOL/L (ref 3.4–5.3)
PROT SERPL-MCNC: 5.3 G/DL (ref 6.4–8.3)
RBC # BLD AUTO: 3.63 10E6/UL (ref 4.4–5.9)
SODIUM SERPL-SCNC: 139 MMOL/L (ref 135–145)
TROPONIN T SERPL HS-MCNC: 193 NG/L
TROPONIN T SERPL HS-MCNC: 197 NG/L
TROPONIN T SERPL HS-MCNC: 69 NG/L
WBC # BLD AUTO: 14.58 10E3/UL (ref 4–11)

## 2025-08-18 PROCEDURE — 82785 ASSAY OF IGE: CPT

## 2025-08-18 PROCEDURE — 87040 BLOOD CULTURE FOR BACTERIA: CPT

## 2025-08-18 PROCEDURE — 250N000011 HC RX IP 250 OP 636

## 2025-08-18 PROCEDURE — 84145 PROCALCITONIN (PCT): CPT

## 2025-08-18 PROCEDURE — 71260 CT THORAX DX C+: CPT

## 2025-08-18 PROCEDURE — 250N000009 HC RX 250

## 2025-08-18 PROCEDURE — 250N000011 HC RX IP 250 OP 636: Performed by: PHYSICIAN ASSISTANT

## 2025-08-18 PROCEDURE — C1880 VENA CAVA FILTER: HCPCS

## 2025-08-18 PROCEDURE — 99233 SBSQ HOSP IP/OBS HIGH 50: CPT | Performed by: INTERNAL MEDICINE

## 2025-08-18 PROCEDURE — C1769 GUIDE WIRE: HCPCS

## 2025-08-18 PROCEDURE — 120N000013 HC R&B IMCU

## 2025-08-18 PROCEDURE — 250N000009 HC RX 250: Performed by: PHYSICIAN ASSISTANT

## 2025-08-18 PROCEDURE — 258N000003 HC RX IP 258 OP 636

## 2025-08-18 PROCEDURE — 255N000002 HC RX 255 OP 636: Performed by: PHYSICIAN ASSISTANT

## 2025-08-18 PROCEDURE — 36415 COLL VENOUS BLD VENIPUNCTURE: CPT | Performed by: HOSPITALIST

## 2025-08-18 PROCEDURE — 99291 CRITICAL CARE FIRST HOUR: CPT

## 2025-08-18 PROCEDURE — 99232 SBSQ HOSP IP/OBS MODERATE 35: CPT | Performed by: HOSPITALIST

## 2025-08-18 PROCEDURE — 99152 MOD SED SAME PHYS/QHP 5/>YRS: CPT

## 2025-08-18 PROCEDURE — 250N000011 HC RX IP 250 OP 636: Mod: JZ

## 2025-08-18 PROCEDURE — 250N000013 HC RX MED GY IP 250 OP 250 PS 637: Performed by: INTERNAL MEDICINE

## 2025-08-18 PROCEDURE — 85004 AUTOMATED DIFF WBC COUNT: CPT

## 2025-08-18 PROCEDURE — 272N000196 HC ACCESSORY CR5

## 2025-08-18 PROCEDURE — 80053 COMPREHEN METABOLIC PANEL: CPT | Performed by: HOSPITALIST

## 2025-08-18 PROCEDURE — 83605 ASSAY OF LACTIC ACID: CPT

## 2025-08-18 PROCEDURE — 36415 COLL VENOUS BLD VENIPUNCTURE: CPT

## 2025-08-18 PROCEDURE — 83605 ASSAY OF LACTIC ACID: CPT | Performed by: HOSPITALIST

## 2025-08-18 PROCEDURE — 84484 ASSAY OF TROPONIN QUANT: CPT

## 2025-08-18 PROCEDURE — 85018 HEMOGLOBIN: CPT

## 2025-08-18 PROCEDURE — 250N000013 HC RX MED GY IP 250 OP 250 PS 637: Performed by: HOSPITALIST

## 2025-08-18 PROCEDURE — B5191ZA FLUOROSCOPY OF INFERIOR VENA CAVA USING LOW OSMOLAR CONTRAST, GUIDANCE: ICD-10-PCS | Performed by: RADIOLOGY

## 2025-08-18 PROCEDURE — 84484 ASSAY OF TROPONIN QUANT: CPT | Performed by: HOSPITALIST

## 2025-08-18 PROCEDURE — C1773 RET DEV, INSERTABLE: HCPCS

## 2025-08-18 PROCEDURE — 93005 ELECTROCARDIOGRAM TRACING: CPT

## 2025-08-18 PROCEDURE — 06H03DZ INSERTION OF INTRALUMINAL DEVICE INTO INFERIOR VENA CAVA, PERCUTANEOUS APPROACH: ICD-10-PCS | Performed by: RADIOLOGY

## 2025-08-18 PROCEDURE — 85025 COMPLETE CBC W/AUTO DIFF WBC: CPT | Performed by: HOSPITALIST

## 2025-08-18 PROCEDURE — 82565 ASSAY OF CREATININE: CPT | Performed by: HOSPITALIST

## 2025-08-18 RX ORDER — IOPAMIDOL 612 MG/ML
100 INJECTION, SOLUTION INTRAVASCULAR ONCE
Status: COMPLETED | OUTPATIENT
Start: 2025-08-18 | End: 2025-08-18

## 2025-08-18 RX ORDER — ONDANSETRON 2 MG/ML
4 INJECTION INTRAMUSCULAR; INTRAVENOUS
Status: DISCONTINUED | OUTPATIENT
Start: 2025-08-18 | End: 2025-08-18 | Stop reason: HOSPADM

## 2025-08-18 RX ORDER — NALOXONE HYDROCHLORIDE 0.4 MG/ML
0.4 INJECTION, SOLUTION INTRAMUSCULAR; INTRAVENOUS; SUBCUTANEOUS
Status: DISCONTINUED | OUTPATIENT
Start: 2025-08-18 | End: 2025-08-18 | Stop reason: HOSPADM

## 2025-08-18 RX ORDER — DIPHENHYDRAMINE HYDROCHLORIDE 50 MG/ML
50 INJECTION, SOLUTION INTRAMUSCULAR; INTRAVENOUS
Status: COMPLETED | OUTPATIENT
Start: 2025-08-18 | End: 2025-08-18

## 2025-08-18 RX ORDER — MEPERIDINE HYDROCHLORIDE 25 MG/ML
25 INJECTION INTRAMUSCULAR; INTRAVENOUS; SUBCUTANEOUS
Refills: 0 | Status: DISCONTINUED | OUTPATIENT
Start: 2025-08-18 | End: 2025-08-20

## 2025-08-18 RX ORDER — IOPAMIDOL 755 MG/ML
95 INJECTION, SOLUTION INTRAVASCULAR ONCE
Status: COMPLETED | OUTPATIENT
Start: 2025-08-18 | End: 2025-08-18

## 2025-08-18 RX ORDER — ALBUTEROL SULFATE 90 UG/1
1-2 INHALANT RESPIRATORY (INHALATION)
Status: DISCONTINUED | OUTPATIENT
Start: 2025-08-18 | End: 2025-08-20

## 2025-08-18 RX ORDER — PIPERACILLIN SODIUM, TAZOBACTAM SODIUM 4; .5 G/20ML; G/20ML
4.5 INJECTION, POWDER, LYOPHILIZED, FOR SOLUTION INTRAVENOUS EVERY 6 HOURS
Status: DISCONTINUED | OUTPATIENT
Start: 2025-08-18 | End: 2025-08-20 | Stop reason: HOSPADM

## 2025-08-18 RX ORDER — DIPHENHYDRAMINE HYDROCHLORIDE 50 MG/ML
25 INJECTION, SOLUTION INTRAMUSCULAR; INTRAVENOUS
Status: COMPLETED | OUTPATIENT
Start: 2025-08-18 | End: 2025-08-18

## 2025-08-18 RX ORDER — LIDOCAINE 40 MG/G
CREAM TOPICAL
Status: DISCONTINUED | OUTPATIENT
Start: 2025-08-18 | End: 2025-08-20

## 2025-08-18 RX ORDER — METHYLPREDNISOLONE SODIUM SUCCINATE 40 MG/ML
40 INJECTION INTRAMUSCULAR; INTRAVENOUS
Status: COMPLETED | OUTPATIENT
Start: 2025-08-18 | End: 2025-08-18

## 2025-08-18 RX ORDER — NALOXONE HYDROCHLORIDE 0.4 MG/ML
0.2 INJECTION, SOLUTION INTRAMUSCULAR; INTRAVENOUS; SUBCUTANEOUS
Status: DISCONTINUED | OUTPATIENT
Start: 2025-08-18 | End: 2025-08-18 | Stop reason: HOSPADM

## 2025-08-18 RX ORDER — FENTANYL CITRATE 50 UG/ML
25-50 INJECTION, SOLUTION INTRAMUSCULAR; INTRAVENOUS EVERY 5 MIN PRN
Status: DISCONTINUED | OUTPATIENT
Start: 2025-08-18 | End: 2025-08-18 | Stop reason: HOSPADM

## 2025-08-18 RX ORDER — IPRATROPIUM BROMIDE AND ALBUTEROL SULFATE 2.5; .5 MG/3ML; MG/3ML
SOLUTION RESPIRATORY (INHALATION)
Status: DISPENSED
Start: 2025-08-18 | End: 2025-08-19

## 2025-08-18 RX ORDER — FLUMAZENIL 0.1 MG/ML
0.2 INJECTION, SOLUTION INTRAVENOUS
Status: DISCONTINUED | OUTPATIENT
Start: 2025-08-18 | End: 2025-08-18 | Stop reason: HOSPADM

## 2025-08-18 RX ORDER — ALBUTEROL SULFATE 0.83 MG/ML
2.5 SOLUTION RESPIRATORY (INHALATION)
Status: DISCONTINUED | OUTPATIENT
Start: 2025-08-18 | End: 2025-08-20

## 2025-08-18 RX ADMIN — SODIUM CHLORIDE 500 ML: 0.9 INJECTION, SOLUTION INTRAVENOUS at 14:58

## 2025-08-18 RX ADMIN — IOPAMIDOL 50 ML: 612 INJECTION, SOLUTION INTRAVENOUS at 12:53

## 2025-08-18 RX ADMIN — PIPERACILLIN AND TAZOBACTAM 4.5 G: 4; .5 INJECTION, POWDER, FOR SOLUTION INTRAVENOUS at 22:13

## 2025-08-18 RX ADMIN — FENTANYL CITRATE 50 MCG: 50 INJECTION, SOLUTION INTRAMUSCULAR; INTRAVENOUS at 12:30

## 2025-08-18 RX ADMIN — Medication 5 ML: at 06:08

## 2025-08-18 RX ADMIN — FENTANYL CITRATE 50 MCG: 50 INJECTION, SOLUTION INTRAMUSCULAR; INTRAVENOUS at 12:48

## 2025-08-18 RX ADMIN — VANCOMYCIN HYDROCHLORIDE 2000 MG: 10 INJECTION, POWDER, LYOPHILIZED, FOR SOLUTION INTRAVENOUS at 18:13

## 2025-08-18 RX ADMIN — LIDOCAINE 1 PATCH: 4 PATCH TOPICAL at 22:12

## 2025-08-18 RX ADMIN — MIDAZOLAM 1 MG: 1 INJECTION INTRAMUSCULAR; INTRAVENOUS at 12:30

## 2025-08-18 RX ADMIN — TICAGRELOR 90 MG: 90 TABLET, FILM COATED ORAL at 08:39

## 2025-08-18 RX ADMIN — PANTOPRAZOLE SODIUM 40 MG: 40 TABLET, DELAYED RELEASE ORAL at 06:03

## 2025-08-18 RX ADMIN — TICAGRELOR 90 MG: 90 TABLET, FILM COATED ORAL at 22:11

## 2025-08-18 RX ADMIN — PANTOPRAZOLE SODIUM 40 MG: 40 TABLET, DELAYED RELEASE ORAL at 17:37

## 2025-08-18 RX ADMIN — DIPHENHYDRAMINE HYDROCHLORIDE 50 MG: 50 INJECTION, SOLUTION INTRAMUSCULAR; INTRAVENOUS at 14:11

## 2025-08-18 RX ADMIN — METHYLPREDNISOLONE SODIUM SUCCINATE 40 MG: 40 INJECTION, POWDER, FOR SOLUTION INTRAMUSCULAR; INTRAVENOUS at 14:21

## 2025-08-18 RX ADMIN — IOPAMIDOL 95 ML: 755 INJECTION, SOLUTION INTRAVENOUS at 16:11

## 2025-08-18 RX ADMIN — ATORVASTATIN CALCIUM 80 MG: 40 TABLET, FILM COATED ORAL at 22:11

## 2025-08-18 RX ADMIN — FAMOTIDINE 20 MG: 10 INJECTION, SOLUTION INTRAVENOUS at 14:11

## 2025-08-18 RX ADMIN — LIDOCAINE HYDROCHLORIDE 3 ML: 10 INJECTION, SOLUTION INFILTRATION; PERINEURAL at 12:30

## 2025-08-18 RX ADMIN — SODIUM CHLORIDE 67 ML: 9 INJECTION, SOLUTION INTRAVENOUS at 16:11

## 2025-08-18 RX ADMIN — METOPROLOL SUCCINATE 12.5 MG: 25 TABLET, EXTENDED RELEASE ORAL at 08:39

## 2025-08-18 RX ADMIN — PIPERACILLIN AND TAZOBACTAM 4.5 G: 4; .5 INJECTION, POWDER, FOR SOLUTION INTRAVENOUS at 17:34

## 2025-08-18 ASSESSMENT — ACTIVITIES OF DAILY LIVING (ADL)
ADLS_ACUITY_SCORE: 66
DEPENDENT_IADLS:: INDEPENDENT
ADLS_ACUITY_SCORE: 66

## 2025-08-19 ENCOUNTER — APPOINTMENT (OUTPATIENT)
Dept: ULTRASOUND IMAGING | Facility: CLINIC | Age: 80
DRG: 811 | End: 2025-08-19
Payer: COMMERCIAL

## 2025-08-19 LAB
ATRIAL RATE - MUSE: 93 BPM
CREAT SERPL-MCNC: 0.94 MG/DL (ref 0.67–1.17)
DIASTOLIC BLOOD PRESSURE - MUSE: NORMAL MMHG
EGFRCR SERPLBLD CKD-EPI 2021: 81 ML/MIN/1.73M2
HGB BLD-MCNC: 8.6 G/DL (ref 13.3–17.7)
IGE SERPL-ACNC: 397 KU/L (ref 0–114)
INTERPRETATION ECG - MUSE: NORMAL
MCV RBC AUTO: 84.6 FL (ref 78–100)
MRSA DNA SPEC QL NAA+PROBE: NEGATIVE
P AXIS - MUSE: 22 DEGREES
PR INTERVAL - MUSE: 198 MS
PROCALCITONIN SERPL IA-MCNC: 6.53 NG/ML
QRS DURATION - MUSE: 94 MS
QT - MUSE: 388 MS
QTC - MUSE: 482 MS
R AXIS - MUSE: -18 DEGREES
SA TARGET DNA: NEGATIVE
SYSTOLIC BLOOD PRESSURE - MUSE: NORMAL MMHG
T AXIS - MUSE: 32 DEGREES
VENTRICULAR RATE- MUSE: 93 BPM

## 2025-08-19 PROCEDURE — 87641 MR-STAPH DNA AMP PROBE: CPT | Performed by: HOSPITALIST

## 2025-08-19 PROCEDURE — 97530 THERAPEUTIC ACTIVITIES: CPT | Mod: GP

## 2025-08-19 PROCEDURE — 99233 SBSQ HOSP IP/OBS HIGH 50: CPT | Performed by: INTERNAL MEDICINE

## 2025-08-19 PROCEDURE — 120N000013 HC R&B IMCU

## 2025-08-19 PROCEDURE — 258N000003 HC RX IP 258 OP 636

## 2025-08-19 PROCEDURE — 250N000013 HC RX MED GY IP 250 OP 250 PS 637: Performed by: HOSPITALIST

## 2025-08-19 PROCEDURE — 250N000013 HC RX MED GY IP 250 OP 250 PS 637: Performed by: INTERNAL MEDICINE

## 2025-08-19 PROCEDURE — 250N000011 HC RX IP 250 OP 636

## 2025-08-19 PROCEDURE — 97110 THERAPEUTIC EXERCISES: CPT | Mod: GP

## 2025-08-19 PROCEDURE — 99232 SBSQ HOSP IP/OBS MODERATE 35: CPT | Mod: FS | Performed by: INTERNAL MEDICINE

## 2025-08-19 PROCEDURE — 85018 HEMOGLOBIN: CPT | Performed by: HOSPITALIST

## 2025-08-19 PROCEDURE — 93970 EXTREMITY STUDY: CPT

## 2025-08-19 RX ORDER — NALOXONE HYDROCHLORIDE 0.4 MG/ML
0.4 INJECTION, SOLUTION INTRAMUSCULAR; INTRAVENOUS; SUBCUTANEOUS
Status: DISCONTINUED | OUTPATIENT
Start: 2025-08-19 | End: 2025-08-20 | Stop reason: HOSPADM

## 2025-08-19 RX ORDER — NALOXONE HYDROCHLORIDE 0.4 MG/ML
0.2 INJECTION, SOLUTION INTRAMUSCULAR; INTRAVENOUS; SUBCUTANEOUS
Status: DISCONTINUED | OUTPATIENT
Start: 2025-08-19 | End: 2025-08-20 | Stop reason: HOSPADM

## 2025-08-19 RX ADMIN — LIDOCAINE 1 PATCH: 4 PATCH TOPICAL at 20:37

## 2025-08-19 RX ADMIN — PIPERACILLIN AND TAZOBACTAM 4.5 G: 4; .5 INJECTION, POWDER, FOR SOLUTION INTRAVENOUS at 16:44

## 2025-08-19 RX ADMIN — PANTOPRAZOLE SODIUM 40 MG: 40 TABLET, DELAYED RELEASE ORAL at 09:46

## 2025-08-19 RX ADMIN — PANTOPRAZOLE SODIUM 40 MG: 40 TABLET, DELAYED RELEASE ORAL at 16:44

## 2025-08-19 RX ADMIN — Medication 5 ML: at 11:21

## 2025-08-19 RX ADMIN — Medication 5 ML: at 20:37

## 2025-08-19 RX ADMIN — Medication 5 ML: at 05:37

## 2025-08-19 RX ADMIN — PIPERACILLIN AND TAZOBACTAM 4.5 G: 4; .5 INJECTION, POWDER, FOR SOLUTION INTRAVENOUS at 09:46

## 2025-08-19 RX ADMIN — ATORVASTATIN CALCIUM 80 MG: 40 TABLET, FILM COATED ORAL at 20:37

## 2025-08-19 RX ADMIN — PIPERACILLIN AND TAZOBACTAM 4.5 G: 4; .5 INJECTION, POWDER, FOR SOLUTION INTRAVENOUS at 22:28

## 2025-08-19 RX ADMIN — PIPERACILLIN AND TAZOBACTAM 4.5 G: 4; .5 INJECTION, POWDER, FOR SOLUTION INTRAVENOUS at 03:46

## 2025-08-19 RX ADMIN — Medication 5 ML: at 22:28

## 2025-08-19 RX ADMIN — METOPROLOL SUCCINATE 12.5 MG: 25 TABLET, EXTENDED RELEASE ORAL at 09:46

## 2025-08-19 RX ADMIN — VANCOMYCIN HYDROCHLORIDE 1750 MG: 10 INJECTION, POWDER, LYOPHILIZED, FOR SOLUTION INTRAVENOUS at 18:00

## 2025-08-19 RX ADMIN — TICAGRELOR 90 MG: 90 TABLET, FILM COATED ORAL at 20:37

## 2025-08-19 RX ADMIN — TICAGRELOR 90 MG: 90 TABLET, FILM COATED ORAL at 09:46

## 2025-08-19 ASSESSMENT — ACTIVITIES OF DAILY LIVING (ADL)
ADLS_ACUITY_SCORE: 66
ADLS_ACUITY_SCORE: 64
ADLS_ACUITY_SCORE: 64
ADLS_ACUITY_SCORE: 66

## 2025-08-20 VITALS
TEMPERATURE: 98.5 F | SYSTOLIC BLOOD PRESSURE: 111 MMHG | OXYGEN SATURATION: 97 % | BODY MASS INDEX: 28.59 KG/M2 | HEART RATE: 88 BPM | DIASTOLIC BLOOD PRESSURE: 70 MMHG | RESPIRATION RATE: 20 BRPM | WEIGHT: 190.8 LBS

## 2025-08-20 LAB
ALBUMIN SERPL BCG-MCNC: 2.5 G/DL (ref 3.5–5.2)
ALP SERPL-CCNC: 369 U/L (ref 40–150)
ALT SERPL W P-5'-P-CCNC: 64 U/L (ref 0–70)
ANION GAP SERPL CALCULATED.3IONS-SCNC: 11 MMOL/L (ref 7–15)
AST SERPL W P-5'-P-CCNC: 163 U/L (ref 0–45)
BACTERIA SPEC CULT: NO GROWTH
BACTERIA SPEC CULT: NO GROWTH
BILIRUB SERPL-MCNC: 0.5 MG/DL
BUN SERPL-MCNC: 14.5 MG/DL (ref 8–23)
CALCIUM SERPL-MCNC: 8.2 MG/DL (ref 8.8–10.4)
CHLORIDE SERPL-SCNC: 106 MMOL/L (ref 98–107)
CREAT SERPL-MCNC: 0.97 MG/DL (ref 0.67–1.17)
EGFRCR SERPLBLD CKD-EPI 2021: 78 ML/MIN/1.73M2
ERYTHROCYTE [DISTWIDTH] IN BLOOD BY AUTOMATED COUNT: 19.4 % (ref 10–15)
GLUCOSE SERPL-MCNC: 114 MG/DL (ref 70–99)
HCO3 SERPL-SCNC: 19 MMOL/L (ref 22–29)
HCT VFR BLD AUTO: 26.4 % (ref 40–53)
HGB BLD-MCNC: 8.5 G/DL (ref 13.3–17.7)
MCH RBC QN AUTO: 27.2 PG (ref 26.5–33)
MCHC RBC AUTO-ENTMCNC: 32.2 G/DL (ref 31.5–36.5)
MCV RBC AUTO: 84.3 FL (ref 78–100)
PLATELET # BLD AUTO: 181 10E3/UL (ref 150–450)
POTASSIUM SERPL-SCNC: 3.6 MMOL/L (ref 3.4–5.3)
PROCALCITONIN SERPL IA-MCNC: 4.99 NG/ML
PROT SERPL-MCNC: 5.1 G/DL (ref 6.4–8.3)
RBC # BLD AUTO: 3.13 10E6/UL (ref 4.4–5.9)
SODIUM SERPL-SCNC: 136 MMOL/L (ref 135–145)
WBC # BLD AUTO: 11.71 10E3/UL (ref 4–11)

## 2025-08-20 PROCEDURE — 250N000013 HC RX MED GY IP 250 OP 250 PS 637: Performed by: INTERNAL MEDICINE

## 2025-08-20 PROCEDURE — 99239 HOSP IP/OBS DSCHRG MGMT >30: CPT | Performed by: INTERNAL MEDICINE

## 2025-08-20 PROCEDURE — 250N000011 HC RX IP 250 OP 636

## 2025-08-20 PROCEDURE — 84145 PROCALCITONIN (PCT): CPT | Performed by: INTERNAL MEDICINE

## 2025-08-20 PROCEDURE — 250N000013 HC RX MED GY IP 250 OP 250 PS 637: Performed by: HOSPITALIST

## 2025-08-20 PROCEDURE — 85027 COMPLETE CBC AUTOMATED: CPT | Performed by: INTERNAL MEDICINE

## 2025-08-20 PROCEDURE — 99232 SBSQ HOSP IP/OBS MODERATE 35: CPT | Mod: FS | Performed by: INTERNAL MEDICINE

## 2025-08-20 PROCEDURE — 82310 ASSAY OF CALCIUM: CPT | Performed by: INTERNAL MEDICINE

## 2025-08-20 RX ORDER — LEVOFLOXACIN 500 MG/1
500 TABLET, FILM COATED ORAL DAILY
Qty: 5 TABLET | Refills: 0 | Status: SHIPPED | OUTPATIENT
Start: 2025-08-20 | End: 2025-08-25

## 2025-08-20 RX ADMIN — METOPROLOL SUCCINATE 12.5 MG: 25 TABLET, EXTENDED RELEASE ORAL at 08:53

## 2025-08-20 RX ADMIN — TICAGRELOR 90 MG: 90 TABLET, FILM COATED ORAL at 08:53

## 2025-08-20 RX ADMIN — PIPERACILLIN AND TAZOBACTAM 4.5 G: 4; .5 INJECTION, POWDER, FOR SOLUTION INTRAVENOUS at 10:52

## 2025-08-20 RX ADMIN — Medication 5 ML: at 10:52

## 2025-08-20 RX ADMIN — PIPERACILLIN AND TAZOBACTAM 4.5 G: 4; .5 INJECTION, POWDER, FOR SOLUTION INTRAVENOUS at 04:26

## 2025-08-20 RX ADMIN — PANTOPRAZOLE SODIUM 40 MG: 40 TABLET, DELAYED RELEASE ORAL at 08:53

## 2025-08-20 RX ADMIN — Medication 5 ML: at 16:02

## 2025-08-20 RX ADMIN — Medication 5 ML: at 15:59

## 2025-08-20 RX ADMIN — Medication 5 ML: at 05:03

## 2025-08-20 ASSESSMENT — ACTIVITIES OF DAILY LIVING (ADL)
ADLS_ACUITY_SCORE: 63
ADLS_ACUITY_SCORE: 53
ADLS_ACUITY_SCORE: 64
ADLS_ACUITY_SCORE: 61
ADLS_ACUITY_SCORE: 63
ADLS_ACUITY_SCORE: 54
ADLS_ACUITY_SCORE: 63
ADLS_ACUITY_SCORE: 61
ADLS_ACUITY_SCORE: 63
ADLS_ACUITY_SCORE: 61
ADLS_ACUITY_SCORE: 53

## 2025-08-21 ENCOUNTER — PATIENT OUTREACH (OUTPATIENT)
Dept: CARE COORDINATION | Facility: CLINIC | Age: 80
End: 2025-08-21
Payer: COMMERCIAL

## 2025-08-21 LAB
BACTERIA SPEC CULT: NORMAL
BACTERIA SPEC CULT: NORMAL

## 2025-08-23 ENCOUNTER — NURSE TRIAGE (OUTPATIENT)
Dept: NURSING | Facility: CLINIC | Age: 80
End: 2025-08-23
Payer: COMMERCIAL

## 2025-08-23 LAB
BACTERIA SPEC CULT: NO GROWTH
BACTERIA SPEC CULT: NO GROWTH

## (undated) DEVICE — RAD G/W INQWIRE .035X260CM J-TIP EXCHANGE IQ35F260J1O5RS

## (undated) DEVICE — MANIFOLD KIT ANGIO AUTOMATED 014613

## (undated) DEVICE — CATH BALLOON NC EUPHORA 2.5X12MM NCEUP2512X

## (undated) DEVICE — TOTE ANGIO CORP PC15AT SAN32CC83O

## (undated) DEVICE — CATH BALLOON NC EMERGE 3.00X20MM H7493926720300

## (undated) DEVICE — VALVE HEMOSTASIS GUARDIAN II OD8 FR GUIDEWIRE 8215

## (undated) DEVICE — GUIDEWIRE VASC 0.014INX180CM RUNTHROUGH 25-1011

## (undated) DEVICE — IMPLANTABLE DEVICE: Type: IMPLANTABLE DEVICE | Status: NON-FUNCTIONAL

## (undated) DEVICE — CATH GUIDELINER 6FR 5571

## (undated) DEVICE — SLEEVE TR BAND RADIAL COMPRESSION DEVICE 24CM TRB24-REG

## (undated) DEVICE — CATH BALLOON EMERGE 2.0X20MM H7493918920200

## (undated) DEVICE — INFL DVC BASIXCOMPAK PLYCRB 30 ATM 13IN 20ML IN4530

## (undated) DEVICE — CATH BALLOON NC EMERGE 3.00X8MM H7493926708300

## (undated) DEVICE — CATH LAUNCHER 6FR JR 4.0 LA6JR40

## (undated) DEVICE — Device

## (undated) DEVICE — CATH GUIDING 6FR AL .75 LA6AL75

## (undated) DEVICE — CATH GUIDING GUIDELINER JR3.5 ST CURVE 5.5FR COAST 5270

## (undated) DEVICE — DEFIB PRO-PADZ LVP LQD GEL ADULT 8900-2105-01

## (undated) DEVICE — CATH BALLOON NC EMERGE 2.50X20MM H7493926720250

## (undated) DEVICE — CATH DIAGNOSTIC RADIAL 5FR TIG 4.0

## (undated) DEVICE — CATH BALLOON NC EMERGE 3.00X12MM H7493926712300

## (undated) DEVICE — KIT HAND CONTROL ANGIOTOUCH ACIST 65CM AT-P65

## (undated) DEVICE — CATH BALLOON NC EUPHORA 3.00X12MM NCEUP3012X

## (undated) DEVICE — CATH BALLO0N SHOCKWAVE C2+ 3.0 X12MM CORONARY C2PIVL3012

## (undated) DEVICE — INTRO GLIDESHEATH SLENDER 6FR 10X45CM 60-1060

## (undated) RX ORDER — NITROGLYCERIN 5 MG/ML
VIAL (ML) INTRAVENOUS
Status: DISPENSED
Start: 2025-07-26

## (undated) RX ORDER — HEPARIN SODIUM 1000 [USP'U]/ML
INJECTION, SOLUTION INTRAVENOUS; SUBCUTANEOUS
Status: DISPENSED
Start: 2025-07-26

## (undated) RX ORDER — LIDOCAINE HYDROCHLORIDE 10 MG/ML
INJECTION, SOLUTION INFILTRATION; PERINEURAL
Status: DISPENSED
Start: 2025-08-18

## (undated) RX ORDER — FENTANYL CITRATE 50 UG/ML
INJECTION, SOLUTION INTRAMUSCULAR; INTRAVENOUS
Status: DISPENSED
Start: 2025-07-26

## (undated) RX ORDER — LIDOCAINE HYDROCHLORIDE 10 MG/ML
INJECTION, SOLUTION EPIDURAL; INFILTRATION; INTRACAUDAL; PERINEURAL
Status: DISPENSED
Start: 2025-07-26

## (undated) RX ORDER — HEPARIN SODIUM 200 [USP'U]/100ML
INJECTION, SOLUTION INTRAVENOUS
Status: DISPENSED
Start: 2025-07-26